# Patient Record
Sex: MALE | Race: BLACK OR AFRICAN AMERICAN | NOT HISPANIC OR LATINO | Employment: STUDENT | URBAN - METROPOLITAN AREA
[De-identification: names, ages, dates, MRNs, and addresses within clinical notes are randomized per-mention and may not be internally consistent; named-entity substitution may affect disease eponyms.]

---

## 2017-12-12 ENCOUNTER — HOSPITAL ENCOUNTER (EMERGENCY)
Facility: OTHER | Age: 32
Discharge: HOME | End: 2017-12-12
Attending: EMERGENCY MEDICINE
Payer: MEDICAID

## 2017-12-12 VITALS
OXYGEN SATURATION: 98 % | HEART RATE: 71 BPM | RESPIRATION RATE: 19 BRPM | HEIGHT: 75 IN | BODY MASS INDEX: 34.82 KG/M2 | DIASTOLIC BLOOD PRESSURE: 68 MMHG | WEIGHT: 280 LBS | SYSTOLIC BLOOD PRESSURE: 156 MMHG | TEMPERATURE: 98 F

## 2017-12-12 DIAGNOSIS — G47.9 SLEEP DISTURBANCE: Primary | ICD-10-CM

## 2017-12-12 DIAGNOSIS — Z76.0 ENCOUNTER FOR MEDICATION REFILL: ICD-10-CM

## 2017-12-12 PROCEDURE — 99282 EMERGENCY DEPT VISIT SF MDM: CPT

## 2017-12-12 RX ORDER — QUETIAPINE FUMARATE 300 MG/1
300 TABLET, FILM COATED ORAL NIGHTLY PRN
Qty: 3 TABLET | Refills: 0 | OUTPATIENT
Start: 2017-12-12 | End: 2018-11-29

## 2017-12-12 RX ORDER — ALPRAZOLAM 1 MG/1
1.5 TABLET ORAL NIGHTLY PRN
Qty: 5 TABLET | Refills: 0 | OUTPATIENT
Start: 2017-12-12 | End: 2018-11-29

## 2017-12-12 RX ORDER — QUETIAPINE FUMARATE 300 MG/1
TABLET, FILM COATED ORAL
COMMUNITY
End: 2017-12-12

## 2017-12-12 RX ORDER — ALPRAZOLAM 1 MG/1
1 TABLET ORAL 2 TIMES DAILY
COMMUNITY
End: 2017-12-12

## 2017-12-12 NOTE — ED NOTES
Two patient identifiers have been checked and are correct.      Appearance: Pt awake, alert & oriented to person, place & time. Pt in no acute distress at present time. Pt is clean and well groomed with clothes appropriately fastened.   Skin: Skin warm, dry & intact. Color consistent with ethnicity. Mucous membranes moist. No breakdown or brusing noted.   Musculoskeletal: Patient moving all extremities well, no obvious swelling or deformities noted. +generalized weakness  Respiratory: Respirations spontaneous, even, and non-labored. Visible chest rise noted. Airway is open and patent. No accessory muscle use noted.   Neurologic: Sensation is intact. Speech is clear and appropriate. Eyes open spontaneously, behavior appropriate to situation, follows commands, facial expression symmetrical, bilateral hand grasp equal and even, purposeful motor response noted.  Cardiac: All peripheral pulses present. No Bilateral lower extremity edema. Cap refill is <3 seconds.  Abdomen: Abdomen soft, non-tender to palpation.   : Pt reports no dysuria or hematuria.

## 2017-12-12 NOTE — ED NOTES
Pt to ED for Medication Refill. Pt reports that he has insomnia & is out of his sleep medications- seroquel and xanax. Pt reports that he has been awake for the past 3 days, unable to sleep. Pt's ran out his medications on Sunday & his MD is out of town through Wednesday. Pt reports some weakness. Pt denies any other complaints. Pt AAOx4 and appropriate at this time. Respirations even and unlabored. No acute distress noted.

## 2017-12-12 NOTE — ED PROVIDER NOTES
Encounter Date: 12/12/2017       History     Chief Complaint   Patient presents with    Medication Refill     Pt reports that he has insomnia & is out of his sleep medications. Pt reports that he has been awake for the past 3 days, unable to sleep. Pt's ran out his medications on Sunday & his MD is out of town through Wednesday.     Patient is a 32 y.o. male with a past medical history of insomnia, presenting to the emergency department with complaints of sleep disturbance and for medication refill.  The patient reports he has a long-standing history of insomnia for which he has had to be hospitalized before.  He states that he typically takes Seroquel and Xanax in the evenings to help him sleep.  He reports that he took his last dose on 12/9/17.  He states that for the past 2 nights, he has been unable to get any sleep.  He admits that he did not schedule his next appointment with his psychiatrist appropriately, and his psychiatrist is out of town until tomorrow.  He states he went to the office today, but that he could not be seen by any one else.  He states he plans to go back tomorrow.  He denies any suicidal or homicidal ideation.  He denies any previous suicide attempt.  He denies any hallucinations.  He denies other symptoms.      The history is provided by the patient.     Review of patient's allergies indicates:   Allergen Reactions    Ativan [lorazepam] Other (See Comments)     Muscle spasms in neck & back when taking medication.     History reviewed. No pertinent past medical history.  History reviewed. No pertinent surgical history.  History reviewed. No pertinent family history.  Social History   Substance Use Topics    Smoking status: Current Some Day Smoker    Smokeless tobacco: Current User     Types: Chew    Alcohol use Yes      Comment: socially     Review of Systems   Constitutional: Negative for activity change, chills, fatigue and fever.   HENT: Negative for congestion, rhinorrhea and sore  throat.    Eyes: Negative for photophobia and visual disturbance.   Respiratory: Negative for cough and shortness of breath.    Cardiovascular: Negative for chest pain.   Gastrointestinal: Negative for abdominal pain, diarrhea, nausea and vomiting.   Genitourinary: Negative for dysuria, hematuria and urgency.   Musculoskeletal: Negative for back pain, myalgias and neck pain.   Skin: Negative for color change and wound.   Neurological: Negative for weakness and headaches.   Psychiatric/Behavioral: Positive for sleep disturbance. Negative for agitation and confusion.       Physical Exam     Initial Vitals [12/12/17 1428]   BP Pulse Resp Temp SpO2   (!) 166/93 79 18 97.6 °F (36.4 °C) 96 %      MAP       117.33         Physical Exam    Nursing note and vitals reviewed.  Constitutional: He appears well-developed and well-nourished. He is not diaphoretic. He is cooperative.  Non-toxic appearance. He does not have a sickly appearance. He does not appear ill. No distress.   Well appearing, -American male unaccompanied in the emergency department.  He speaking clear and full sentences.  He is in no acute distress.  He ambulates without difficulty.   HENT:   Head: Normocephalic and atraumatic.   Right Ear: External ear normal.   Left Ear: External ear normal.   Nose: Nose normal.   Mouth/Throat: Oropharynx is clear and moist.   Eyes: Conjunctivae and EOM are normal.   Neck: Normal range of motion. Neck supple.   Cardiovascular: Normal rate, regular rhythm and normal heart sounds.   Pulmonary/Chest: Breath sounds normal. No respiratory distress. He has no wheezes.   Abdominal: Soft. Bowel sounds are normal. He exhibits no distension. There is no tenderness. There is no rebound and no guarding.   Musculoskeletal: Normal range of motion.   Neurological: He is alert and oriented to person, place, and time. He has normal strength. No sensory deficit. GCS eye subscore is 4. GCS verbal subscore is 5. GCS motor subscore is 6.    Skin: Skin is warm.   Psychiatric: He has a normal mood and affect. His speech is normal and behavior is normal. Judgment and thought content normal.         ED Course   Procedures  Labs Reviewed - No data to display          Medical Decision Making:   Initial Assessment:   Urgent evaluation of a 32-year-old male with a past medical history of insomnia, presenting to the emergency department for medication refill.  Patient is afebrile, nontoxic appearing, hemodynamically stable.  No focal neurological deficit or weakness.  Patient denies suicidal or homicidal ideation, denies auditory or visual hallucinations.  ED Management:  Reviewed on [facility], patient has monthly refills, last refilled over 1 month ago. Will plan to refill the patient with 3 days worth of Seroquel and Xanax.  Patient counseled on symptomatic care and treatment.  Urged to obtain close follow-up with psychiatry.  Stable for discharge. The patient was instructed to follow up with a primary care provider in 2 days or to return to the emergency department for worsening symptoms. The treatment plan was discussed with the patient who demonstrated understanding and comfort with plan. The patient's history, physical exam, and plan of care was discussed with and agreed upon with my supervising physician.    Other:   I have discussed this case with another health care provider.       <> Summary of the Discussion: Dr. Skinner  This note was created using Dragon Medical Dictation. There may be typographical errors secondary to dictation.                    ED Course      Clinical Impression:     1. Sleep disturbance    2. Encounter for medication refill         Disposition:   Disposition: Discharged  Condition: Stable                        Romy Santos PA-C  12/12/17 4149

## 2018-11-29 ENCOUNTER — HOSPITAL ENCOUNTER (EMERGENCY)
Facility: OTHER | Age: 33
Discharge: HOME | End: 2018-11-29
Attending: EMERGENCY MEDICINE

## 2018-11-29 VITALS
RESPIRATION RATE: 18 BRPM | HEART RATE: 94 BPM | OXYGEN SATURATION: 97 % | DIASTOLIC BLOOD PRESSURE: 97 MMHG | WEIGHT: 280 LBS | TEMPERATURE: 97 F | SYSTOLIC BLOOD PRESSURE: 172 MMHG | BODY MASS INDEX: 34.82 KG/M2 | HEIGHT: 75 IN

## 2018-11-29 DIAGNOSIS — Z76.0 MEDICATION REFILL: Primary | ICD-10-CM

## 2018-11-29 DIAGNOSIS — G47.00 INSOMNIA, UNSPECIFIED TYPE: ICD-10-CM

## 2018-11-29 PROCEDURE — 99281 EMR DPT VST MAYX REQ PHY/QHP: CPT

## 2018-11-29 RX ORDER — DEXTROAMPHETAMINE SACCHARATE, AMPHETAMINE ASPARTATE MONOHYDRATE, DEXTROAMPHETAMINE SULFATE AND AMPHETAMINE SULFATE 2.5; 2.5; 2.5; 2.5 MG/1; MG/1; MG/1; MG/1
10 CAPSULE, EXTENDED RELEASE ORAL EVERY MORNING
Qty: 10 CAPSULE | Refills: 0 | Status: SHIPPED | OUTPATIENT
Start: 2018-11-29 | End: 2018-11-29 | Stop reason: SDUPTHER

## 2018-11-29 RX ORDER — QUETIAPINE FUMARATE 300 MG/1
300 TABLET, FILM COATED ORAL NIGHTLY
Qty: 10 TABLET | Refills: 0 | Status: SHIPPED | OUTPATIENT
Start: 2018-11-29 | End: 2018-11-29 | Stop reason: SDUPTHER

## 2018-11-29 RX ORDER — QUETIAPINE FUMARATE 300 MG/1
300 TABLET, FILM COATED ORAL NIGHTLY
Qty: 10 TABLET | Refills: 0 | Status: SHIPPED | OUTPATIENT
Start: 2018-11-29 | End: 2019-03-01

## 2018-11-29 RX ORDER — ALPRAZOLAM 1 MG/1
1 TABLET ORAL NIGHTLY PRN
Qty: 10 TABLET | Refills: 0 | Status: SHIPPED | OUTPATIENT
Start: 2018-11-29 | End: 2018-11-29 | Stop reason: SDUPTHER

## 2018-11-29 RX ORDER — DEXTROAMPHETAMINE SACCHARATE, AMPHETAMINE ASPARTATE MONOHYDRATE, DEXTROAMPHETAMINE SULFATE AND AMPHETAMINE SULFATE 2.5; 2.5; 2.5; 2.5 MG/1; MG/1; MG/1; MG/1
10 CAPSULE, EXTENDED RELEASE ORAL EVERY MORNING
Qty: 10 CAPSULE | Refills: 0 | Status: SHIPPED | OUTPATIENT
Start: 2018-11-29 | End: 2019-03-01

## 2018-11-29 RX ORDER — ALPRAZOLAM 1 MG/1
1 TABLET ORAL NIGHTLY PRN
Qty: 10 TABLET | Refills: 0 | Status: SHIPPED | OUTPATIENT
Start: 2018-11-29 | End: 2019-03-01 | Stop reason: SDUPTHER

## 2018-11-29 NOTE — ED PROVIDER NOTES
Encounter Date: 11/29/2018       History     Chief Complaint   Patient presents with    Insomnia     Pt reports he is out of his meds especially xanax. Pt reports his Dr is out of town and he does not know when he will return. Pt reports he is anxious and has not slept in three days. He denies SI or HI.      This is a 34 y/o M who presents c/o insomnia x 3 days.  Reports out of seroquel and xanax which he takes at night to go to sleep, has been stable on these medications for years.  Had a drug test on Monday at PCP office and was supposed to get refills this week but his physician is out of town.       The history is provided by the patient.     Review of patient's allergies indicates:   Allergen Reactions    Ativan [lorazepam] Other (See Comments)     Muscle spasms in neck & back when taking medication.     No past medical history on file.  No past surgical history on file.  No family history on file.  Social History     Tobacco Use    Smoking status: Current Some Day Smoker    Smokeless tobacco: Current User     Types: Chew   Substance Use Topics    Alcohol use: Yes     Comment: socially    Drug use: No     Review of Systems   Constitutional: Negative for fever.   HENT: Negative for sore throat.    Respiratory: Negative for shortness of breath.    Cardiovascular: Negative for chest pain.   Gastrointestinal: Negative for nausea.   Genitourinary: Negative for dysuria.   Musculoskeletal: Negative for back pain.   Skin: Negative for rash.   Neurological: Negative for weakness.   Hematological: Does not bruise/bleed easily.   Psychiatric/Behavioral: Positive for dysphoric mood and sleep disturbance. Negative for suicidal ideas. The patient is nervous/anxious.    All other systems reviewed and are negative.      Physical Exam     Initial Vitals [11/29/18 1416]   BP Pulse Resp Temp SpO2   (!) 172/97 94 18 97.2 °F (36.2 °C) 97 %      MAP       --         Physical Exam    Nursing note and vitals  reviewed.  Constitutional: He appears well-developed and well-nourished.   HENT:   Head: Normocephalic and atraumatic.   Eyes: Conjunctivae and EOM are normal. Pupils are equal, round, and reactive to light.   Neck: Normal range of motion. Neck supple.   Cardiovascular: Normal rate, regular rhythm, normal heart sounds and intact distal pulses. Exam reveals no gallop and no friction rub.    No murmur heard.  Pulmonary/Chest: Breath sounds normal. No stridor. No respiratory distress. He has no wheezes. He has no rhonchi. He has no rales. He exhibits no tenderness.   Abdominal: Soft. Bowel sounds are normal. He exhibits no distension. There is no tenderness. There is no rebound and no guarding.   Musculoskeletal: Normal range of motion.   Neurological: He is alert and oriented to person, place, and time. He has normal strength. No cranial nerve deficit. GCS score is 15. GCS eye subscore is 4. GCS verbal subscore is 5. GCS motor subscore is 6.   Skin: Skin is warm and dry. Capillary refill takes less than 2 seconds.   Psychiatric: His speech is normal and behavior is normal. His mood appears anxious. Cognition and memory are normal.         ED Course   Procedures  Labs Reviewed - No data to display       Imaging Results    None          Medical Decision Making:   Initial Assessment:   34 y/o M out of usual medications, reviewed  and patient appears compliant with treatment plan.  WIll rx brief refill, patient understands this is for emergency supply and that he needs to see his doctor next week in order to obtain usual rx.                    ED Course as of Nov 29 1440   Thu Nov 29, 2018   1418 Sort note: Lola Wetzel, 33 y.o.  presented to the ED with c/o running out of medication (xanax) with insomnia and anxiety, in the middle of finals. No SI/HI.    Patient seen and medically screened by the Physician in Triage due to ED crowding.  Appropriate tests and/or medications ordered.  I am not assuming care of this  patient at this time 2:18 PM. LF    [LF]      ED Course User Index  [LF] Vonda Tafoya MD     Clinical Impression:   The primary encounter diagnosis was Medication refill. A diagnosis of Insomnia, unspecified type was also pertinent to this visit.      Disposition:   Disposition: Discharged  Condition: Stable                        Vonda Tafoya MD  11/30/18 1030

## 2019-03-01 ENCOUNTER — HOSPITAL ENCOUNTER (EMERGENCY)
Facility: OTHER | Age: 34
Discharge: HOME | End: 2019-03-01
Attending: EMERGENCY MEDICINE

## 2019-03-01 VITALS
OXYGEN SATURATION: 99 % | BODY MASS INDEX: 35.43 KG/M2 | HEART RATE: 87 BPM | SYSTOLIC BLOOD PRESSURE: 173 MMHG | RESPIRATION RATE: 18 BRPM | DIASTOLIC BLOOD PRESSURE: 106 MMHG | HEIGHT: 75 IN | TEMPERATURE: 98 F | WEIGHT: 285 LBS

## 2019-03-01 DIAGNOSIS — G47.00 INSOMNIA, UNSPECIFIED TYPE: Primary | ICD-10-CM

## 2019-03-01 DIAGNOSIS — R03.0 ELEVATED BLOOD PRESSURE READING: ICD-10-CM

## 2019-03-01 PROCEDURE — 99281 EMR DPT VST MAYX REQ PHY/QHP: CPT

## 2019-03-01 RX ORDER — ALPRAZOLAM 1 MG/1
1 TABLET ORAL NIGHTLY PRN
Qty: 10 TABLET | Refills: 0 | OUTPATIENT
Start: 2019-03-01 | End: 2020-01-21

## 2019-03-01 RX ORDER — QUETIAPINE FUMARATE 300 MG/1
300 TABLET, FILM COATED ORAL NIGHTLY
Qty: 10 TABLET | Refills: 0 | Status: SHIPPED | OUTPATIENT
Start: 2019-03-01 | End: 2022-11-22 | Stop reason: DRUGHIGH

## 2019-03-02 NOTE — ED TRIAGE NOTES
Pt presents to ED for prescription refill, states he can't see his PCP because his insurance company went bankrupt. Pt reports he takes Xanax and Seroquel nightly for insomnia. Pt AAO x4.

## 2019-03-02 NOTE — ED PROVIDER NOTES
Encounter Date: 3/1/2019    SCRIBE #1 NOTE: I, Ann Whitehead, am scribing for, and in the presence of, Dr. Marshall.       History     Chief Complaint   Patient presents with    Medication Refill     Pt requesting refill of xanax, and seroquel.      Time seen by provider: 7:15 PM    This is a 34 y.o. male who presents for medication refill. He ran out of Seroquel (300 mg) and Xanax two days ago. He has been on Xanax for about 6 years and denies experiencing any seizures when he has run out of it in the past. He was given refills for these medications while on Medicaid and then could not be seen by the same doctor when he started another insurance. He states he no longer has insurance because of the company went bankrupt. He has not signed up for Medicaid yet.      The history is provided by the patient.     Review of patient's allergies indicates:   Allergen Reactions    Ativan [lorazepam] Other (See Comments)     Muscle spasms in neck & back when taking medication.     Past Medical History:   Diagnosis Date    Insomnia      History reviewed. No pertinent surgical history.  History reviewed. No pertinent family history.  Social History     Tobacco Use    Smoking status: Current Some Day Smoker    Smokeless tobacco: Current User     Types: Chew   Substance Use Topics    Alcohol use: Yes     Comment: socially    Drug use: No     Review of Systems   Constitutional: Negative for fever.   HENT: Negative for sore throat.    Respiratory: Negative for shortness of breath.    Cardiovascular: Negative for chest pain.   Gastrointestinal: Negative for nausea.   Genitourinary: Negative for dysuria.   Musculoskeletal: Negative for back pain.   Skin: Negative for rash.   Neurological: Negative for seizures and weakness.   Hematological: Does not bruise/bleed easily.       Physical Exam     Initial Vitals [03/01/19 1836]   BP Pulse Resp Temp SpO2   (!) 187/111 93 18 98.2 °F (36.8 °C) 99 %      MAP       --         Physical  Exam    Nursing note and vitals reviewed.  Constitutional: He appears well-developed and well-nourished. He is not diaphoretic. No distress.   HENT:   Head: Normocephalic and atraumatic.   Eyes: EOM are normal.   Neck: Normal range of motion. Neck supple.   Pulmonary/Chest: No respiratory distress.   Musculoskeletal: Normal range of motion.   Neurological: He is alert and oriented to person, place, and time.   Skin: Skin is warm and dry.   Psychiatric: He has a normal mood and affect.         ED Course   Procedures  Labs Reviewed - No data to display       Imaging Results    None          Medical Decision Making:   Initial Assessment:   35 yo M with reported chronic insomnia here with concern for need of medication refill. Pt is on both Seroquel 300mg qhs and Xanax, but reportedly ran out 2 days ago. Pt denies seizures when unable to obtain xanax in the past, has been here in the ED for similar requests in past.  aware shows last refill of Xanax 2/1/19. Given concern for abrupt benzo withdrawal I discussed with the patient need for strict fu psych within last week to obtain his medications, but will dc home with short course for interrim. In addition, will need to see a PCP regarding elevated bp.             Scribe Attestation:   Scribe #1: I performed the above scribed service and the documentation accurately describes the services I performed. I attest to the accuracy of the note.    Attending Attestation:           Physician Attestation for Scribe:  Physician Attestation Statement for Scribe #1: I, Dr. Marshall, reviewed documentation, as scribed by Ann Whitehead in my presence, and it is both accurate and complete.                    Clinical Impression:     1. Insomnia, unspecified type    2. Elevated blood pressure reading          Disposition:   Disposition: Discharged  Condition: Bozena Marshall MD  03/01/19 5207

## 2019-03-02 NOTE — DISCHARGE INSTRUCTIONS
You will need to follow with a primary doctor and psychiatrist for these long term medications as they can be dangerous to stop abruptly.You will also need to have your blood pressure re-checked.

## 2019-06-21 ENCOUNTER — HOSPITAL ENCOUNTER (EMERGENCY)
Facility: OTHER | Age: 34
Discharge: HOME | End: 2019-06-21
Attending: EMERGENCY MEDICINE
Payer: MEDICAID

## 2019-06-21 VITALS
OXYGEN SATURATION: 100 % | RESPIRATION RATE: 18 BRPM | SYSTOLIC BLOOD PRESSURE: 145 MMHG | HEIGHT: 75 IN | HEART RATE: 72 BPM | BODY MASS INDEX: 34.82 KG/M2 | TEMPERATURE: 98 F | WEIGHT: 280 LBS | DIASTOLIC BLOOD PRESSURE: 86 MMHG

## 2019-06-21 DIAGNOSIS — M54.12 RADICULOPATHY OF CERVICAL REGION: Primary | ICD-10-CM

## 2019-06-21 DIAGNOSIS — R29.898 HAND WEAKNESS: ICD-10-CM

## 2019-06-21 PROCEDURE — 99284 EMERGENCY DEPT VISIT MOD MDM: CPT | Mod: 25

## 2019-06-21 NOTE — DISCHARGE INSTRUCTIONS
Please report to the first floor, imaging center, for MRI today at 4:30PM.   Please wait to hear from [facility] Scheduling Department for your neurology appointment. If you develop any worsening symptoms, return to the ER.

## 2019-06-21 NOTE — ED PROVIDER NOTES
"Encounter Date: 6/21/2019       History     Chief Complaint   Patient presents with    Shoulder Pain     + right shoulder pain w/ right hand swelling after trip and fall x 1 month ago. Denies hitting head or LOC. Pt states," I was waiting for my insurance to kick in for an MRI so I can afford it". Denies hitting head or LOC     Patient is 34 year old male who present with complaints of right arm weakness that has been present for the past two months. Patient reports he has a  remote neck injury from 2012 described as "stingers". He reports that at that time had right shoulder pain and some numbness and tingling to the right arm. He reports over the years this has resolved and he never explored actually having a surgery.  He admits that about 2 months ago he had a fall to the ground that resulted in very similar type of injury described as a stinger quote.  He reports at the time of the injury he had no pain at all was able to get up and even complete his work out later that afternoon.  Upon waking in the morning he reports that his right arm was useless .  He describes weakness with some posterior shoulder pain.  He admits that he did not seek medical attention at that time because he did not have insurance.  Today he admits that his insurance is coming through and he decided not to delay his care any longer.  He reports no progression of symptoms only persistence and admits that intensity of these symptoms wax and wane and today is a good day.  He reports that he has good motion in his hand but admits it is not as strong as it should be.  He has no other complaints including no vision changes, nausea, vomiting, headache, dizziness, vision changes.  No gait abnormalities bladder or bowel incontinence or saddle anesthesia.  He has not taken any medications to help with the symptoms.  He is currently unaccompanied in the ER.          Review of patient's allergies indicates:   Allergen Reactions    Ativan " [lorazepam] Other (See Comments)     Muscle spasms in neck & back when taking medication.     Past Medical History:   Diagnosis Date    Insomnia      No past surgical history on file.  No family history on file.  Social History     Tobacco Use    Smoking status: Current Some Day Smoker    Smokeless tobacco: Current User     Types: Chew   Substance Use Topics    Alcohol use: Yes     Comment: socially    Drug use: No     Review of Systems   Constitutional: Negative for fever.   HENT: Negative for sore throat.    Respiratory: Negative for shortness of breath.    Cardiovascular: Negative for chest pain.   Gastrointestinal: Negative for nausea.   Genitourinary: Negative for dysuria.   Musculoskeletal: Negative for back pain.        Right hand weakness   Skin: Negative for rash.   Neurological: Negative for weakness.   Hematological: Does not bruise/bleed easily.       Physical Exam     Initial Vitals [06/21/19 1357]   BP Pulse Resp Temp SpO2   (!) 153/90 77 18 98.3 °F (36.8 °C) 98 %      MAP       --         Physical Exam    Nursing note and vitals reviewed.  Constitutional: He appears well-developed and well-nourished. He is not diaphoretic. No distress.   Healthy-appearing male in no acute distress or apparent pain. Makes good eye contact, speaks in clear full sentences and ambulates with ease.   HENT:   Head: Normocephalic and atraumatic.   Eyes: Conjunctivae and EOM are normal. Pupils are equal, round, and reactive to light. Right eye exhibits no discharge. Left eye exhibits no discharge. No scleral icterus.   Neck: Normal range of motion.   Cardiovascular: Normal rate, regular rhythm, normal heart sounds and intact distal pulses. Exam reveals no gallop and no friction rub.    No murmur heard.  Pulmonary/Chest: Breath sounds normal. He has no wheezes. He has no rhonchi. He has no rales.   Abdominal: Soft. Bowel sounds are normal. There is no tenderness. There is no rebound and no guarding.   Musculoskeletal:    No C, T, L midline bony tenderness crepitus or step-off  No tenderness to palpation to right trapezius  Right shoulder bony landmarks are nontender to palpation with no obvious deformity.  Shoulder elbow and wrist have normal range of motion, strength against resistance  Right upper and forearm have normal sensation to light touch.  For 2nd and 3rd fingers have 3/5 strength against resistance and median nerve distribution with normal sensation to light touch.  Decreased  strength when compared to the left.  Right upper extremity has normal exam   Lymphadenopathy:     He has no cervical adenopathy.   Neurological: He is alert and oriented to person, place, and time. He has normal strength. GCS score is 15. GCS eye subscore is 4. GCS verbal subscore is 5. GCS motor subscore is 6.   Normal gait   Skin: Skin is warm. Capillary refill takes less than 2 seconds. No rash and no abscess noted. No erythema.   Psychiatric: He has a normal mood and affect. His behavior is normal. Thought content normal.         ED Course   Procedures  Labs Reviewed - No data to display       Imaging Results    None          Medical Decision Making:   ED Management:  Urgent evaluation a 34-year-old male who presents with complaints most consistent with right-sided radiculopathy.  With motor deficits to median nerve distribution of the right hand.  He is afebrile, nontoxic appearing, hemodynamically stable. Physical exam outlined above and reveals 3/5 weakness and range of motion deficits to the 1st 2nd and 3rd digit with decreased  strength on the right side.  There was no clinical evidence of neurovascular compromise.  I appreciate triage note reports swelling but there is no clinical evidence of swelling and patient denies this.  I have considered DVT but do not feel that ultrasound is warranted at this time.  Because this is subacute complaint I do not feel that urgent MRI is warranted however do feel that Neurosurgery follow-up in  the outpatient setting would be prudent Ace tap.  Will attempt to facilitate Neurosurgery appointment in the outpatient setting.  I have considered but do not suspect cord compression, acute neurovascular compromise, CVA, TIA fracture, infection.  He verbalized understanding is amenable to plan.  He is stable for discharge. Case discussed with attending physician who agrees with plan.    3:00 PM discussed case with [facility]  line who is attempted to facilitate follow-up with Neurology and Internal medicine. He reports that Neurosurgery will not see the patient without a prior MRI to review. Because patient does not have an MRI on record, will attempt Neuro and internal medicine ambulatory referral.   3:18 PM EDMD was able to schedule the patient for an outpatient MRI C-spine without contrast at 4:30 today at the [facility]. Patient aware and amenable to plan. Patient is stable for discharge.   Other:   I have discussed this case with another health care provider.       <> Summary of the Discussion: [place]                      Clinical Impression:       ICD-10-CM ICD-9-CM   1. Radiculopathy of cervical region M54.12 723.4   2. Hand weakness R29.898 728.87                                Ambika Tolentino PA-C  06/21/19 1609

## 2020-01-21 ENCOUNTER — HOSPITAL ENCOUNTER (EMERGENCY)
Facility: OTHER | Age: 35
Discharge: HOME | End: 2020-01-21
Attending: EMERGENCY MEDICINE
Payer: MEDICAID

## 2020-01-21 VITALS
TEMPERATURE: 99 F | HEIGHT: 75 IN | OXYGEN SATURATION: 98 % | WEIGHT: 290 LBS | BODY MASS INDEX: 36.06 KG/M2 | DIASTOLIC BLOOD PRESSURE: 117 MMHG | HEART RATE: 111 BPM | SYSTOLIC BLOOD PRESSURE: 181 MMHG | RESPIRATION RATE: 16 BRPM

## 2020-01-21 DIAGNOSIS — M54.12 CERVICAL RADICULOPATHY: Primary | ICD-10-CM

## 2020-01-21 DIAGNOSIS — M48.02 CERVICAL SPINAL STENOSIS: ICD-10-CM

## 2020-01-21 PROCEDURE — 99284 EMERGENCY DEPT VISIT MOD MDM: CPT | Mod: 25

## 2020-01-21 RX ORDER — GABAPENTIN 100 MG/1
100 CAPSULE ORAL 3 TIMES DAILY
Qty: 90 CAPSULE | Refills: 0 | Status: SHIPPED | OUTPATIENT
Start: 2020-01-21 | End: 2022-06-28

## 2020-01-21 NOTE — ED PROVIDER NOTES
"Encounter Date: 1/21/2020    SCRIBE #1 NOTE: IYesi am scribing for, and in the presence of, Dr. Roque.   SCRIBE #2 NOTE: I, Jed Delgadillo, am scribing for, and in the presence of, Dr. Roque.     History     Chief Complaint   Patient presents with    Hand Pain     R hand "tingling sensation" x several months, hx of "old football injury"; reporting increasing numbness today.      Time seen by provider: 4:30 PM    This is a 34 y.o. male who presents with complaint of bilateral hand tingling that began several months ago. Patient reports history of stenosis and states symptoms have gotten progressively worse since March 2019. He states prior to March he experience bilateral hand tingling after waking up in the morning. After March he reports waking up one morning with the tingling but states it did not go away. He also reports that he began to notice decreased  strength on right hand. Patient states symptoms in right hand worsened this morning in intensity. He can fully range his fingers.  Patient denies any acute injury.  He denies any other complaints at this time. Denies fever, nausea, vomiting, headache, dizziness, or light headedness.     The history is provided by the patient.     Review of patient's allergies indicates:   Allergen Reactions    Ativan [lorazepam] Other (See Comments)     Muscle spasms in neck & back when taking medication.     Past Medical History:   Diagnosis Date    Insomnia      History reviewed. No pertinent surgical history.  History reviewed. No pertinent family history.  Social History     Tobacco Use    Smoking status: Current Some Day Smoker     Types: Vaping with nicotine    Smokeless tobacco: Current User     Types: Chew   Substance Use Topics    Alcohol use: Yes     Comment: socially    Drug use: No     Review of Systems   Constitutional: Negative for chills and fever.   HENT: Negative for congestion, sore throat and trouble swallowing.    Eyes: Negative " for photophobia and visual disturbance.   Respiratory: Negative for cough and shortness of breath.    Cardiovascular: Negative for chest pain.   Gastrointestinal: Negative for abdominal pain, nausea and vomiting.   Genitourinary: Negative for dysuria and hematuria.   Musculoskeletal: Negative for back pain and neck pain.   Skin: Negative for rash and wound.   Neurological: Negative for dizziness, light-headedness and headaches.        Positive for bilateral hand tingling and decrease strength of right hand.   Psychiatric/Behavioral: Negative.    All other systems reviewed and are negative.      Physical Exam     Initial Vitals [01/21/20 1612]   BP Pulse Resp Temp SpO2   (!) 157/96 105 18 98.8 °F (37.1 °C) 97 %      MAP       --         Physical Exam    Nursing note and vitals reviewed.  Constitutional: He appears well-developed and well-nourished. No distress.   HENT:   Head: Normocephalic and atraumatic.   Nose: Nose normal.   Mouth/Throat: Oropharynx is clear and moist.   Eyes: Conjunctivae and EOM are normal. Pupils are equal, round, and reactive to light. Right eye exhibits no discharge. Left eye exhibits no discharge.   Neck: Normal range of motion. Neck supple. No JVD present.   Cardiovascular: Normal rate, regular rhythm, normal heart sounds and intact distal pulses.   Pulmonary/Chest: Breath sounds normal. No respiratory distress. He has no wheezes. He has no rhonchi. He has no rales.   Abdominal: Soft. Bowel sounds are normal. He exhibits no distension. There is no tenderness.   Musculoskeletal: Normal range of motion. He exhibits no tenderness.   Neurological: He is alert and oriented to person, place, and time. He displays normal reflexes. No sensory deficit.   + Spurling's left    Right hand: 3/5 strength thumb / index / ring finger. Full ROM. 2+  Radial pulse. Sensation intact.   Skin: Skin is warm. Capillary refill takes less than 2 seconds. No rash noted.   Psychiatric: He has a normal mood and  affect. Thought content normal.         ED Course   Procedures  Labs Reviewed - No data to display       Imaging Results          X-Ray Cervical Spine AP And Lateral (Final result)  Result time 01/21/20 17:08:19    Final result by Connor Morrison MD (01/21/20 17:08:19)                 Impression:      No evidence of acute fracture or listhesis of the cervical spine.  Additional evaluation, as clinically warranted.      Electronically signed by: Connor Morrison MD  Date:    01/21/2020  Time:    17:08             Narrative:    EXAMINATION:  XR CERVICAL SPINE AP LATERAL    CLINICAL HISTORY:  cervical radiculopathy;    TECHNIQUE:  AP, lateral and open mouth views of the cervical spine were performed.    COMPARISON:  None.    FINDINGS:  The craniocervical junction is within normal limits.  The predental space is maintained.  The prevertebral soft tissues are unremarkable.  The cervical alignment is maintained.  The vertebral body heights are maintained.  The posterior elements are unremarkable.  The lateral masses of C1 are nondisplaced.  The intervertebral disc spaces are within normal limits.  There is no evidence of acute fracture or listhesis of the cervical spine.                              X-Rays:   Independently Interpreted Readings:   Other Readings:  C-Spine: No fracture. No dislocation. Loss of lordosis. Degenerative changes noted.    Medical Decision Making:   History:   Old Medical Records: I decided to obtain old medical records.  Differential Diagnosis:   CVA, TIA, intracranial mass, intracranial hemorrhage, Cauda equina syndrome, diskitis/osteomyelitis, epidural/paraspinal abscess, AAA, aortic dissection, post-op/hardware infection, trauma/vertebral fracture, spinal cord injury, disc herniation, spinal stenosis, sciatica, radiculopathy, neoplasm, muscle spasm, neuropathic pain,   Independently Interpreted Test(s):   I have ordered and independently interpreted X-rays - see prior notes.  Clinical Tests:    Radiological Study: Ordered and Reviewed  ED Management:  34-year-old male with concerning findings of weakness in 3 fingers of his right hand for the past 10 months that t is getting worse, with MRI findings as detailed above.  Patient reports that he had follow-up appointment with [facility] scheduled for February, but he canceled it in order to attempt to see if he could be seen sooner in our system. After taking into careful account the patient's historical factors, physical exam findings, empirical and objective data obtained on ED workup, the patient appears to be low risk for an emergent medical condition. I feel it is safe and appropriate at this time for the patient to be discharged for follow up and re-evaluation as detailed in the discharge instructions. I have discussed the specifics of the workup with the patient/guardian and the patient/guardian has verbalized understanding of the details of the workup, the diagnosis, the treatment plan, and the need for outpatient follow-up.  Although the patient has no emergent etiology today this does not preclude the development of an emergent condition some in addition, I have advised the patient that they can return to the ED and/or activated EMS at any time with worsening of her symptoms, change of their symptoms, or with any other medical complaints.  Patient's/guardian understands the emergency department visit today was primarily to address immediate concerns and to rule out emergent causes of the symptoms that may require further workup and evaluation as an outpatient.  All questions addressed and patient's/guardian given discharge instructions and follow-up information.  Patient improved with treatment in the emergency department and is comfortable going home.  Educated the patient on warning signs and symptoms for which they must seek immediate medical attention.  I emphasized the importance of followup.  Patient understands that the emergency  visit today is primarily to address immediate concerns and to rule out emergent cause of symptoms and that they may require further workup and evaluation as an outpatient. All questions addressed and patient given discharge instructions and followup information.               Scribe Attestation:   Scribe #1: I performed the above scribed service and the documentation accurately describes the services I performed. I attest to the accuracy of the note.  Scribe #2: I performed the above scribed service and the documentation accurately describes the services I performed. I attest to the accuracy of the note.    Attending Attestation:           Physician Attestation for Scribe:  Physician Attestation Statement for Scribe #1: I, Dr. Roque, reviewed documentation, as scribed by Yesi Rosas in my presence, and it is both accurate and complete.   Physician Attestation Statement for Scribe #2: I, Dr. Roque, reviewed documentation, as scribed by Jed Delgadillo in my presence, and it is both accurate and complete. I also acknowledge and confirm the content of the note done by Babakibe #1.              ED Course as of Jan 22 1704   Tue Jan 21, 2020 1927 Unable to find patient in results waiting room, or the regular waiting room.  Will continue to search    [MA]   2012 Discussed findings of MRI to patient.  Emphasized to patient the need to follow up with Neurosurgery, my concerns with his prognosis    [MA]      ED Course User Index  [MA] Timo Roque MD                Clinical Impression:     1. Cervical radiculopathy    2. Cervical spinal stenosis                              Timo Roque MD  01/23/20 2709

## 2020-01-21 NOTE — ED TRIAGE NOTES
Pt presents to ed c/o R hand intermittent numbness without tingling that he states is r/t an old football injury. The pt states that the numbness is sometimes bilaterally, stating that its to the last 2 fingers. He denies any pain at this point or numbness/tingling but admits to weakness to the right hand. The pt denies any SOB, n/v/d, fever or chills. Pt AAOx4, resp pattern even and non labored.

## 2022-06-28 ENCOUNTER — HOSPITAL ENCOUNTER (INPATIENT)
Facility: OTHER | Age: 37
LOS: 9 days | Discharge: HOME | DRG: 438 | End: 2022-07-07
Attending: EMERGENCY MEDICINE | Admitting: HOSPITALIST
Payer: MEDICAID

## 2022-06-28 DIAGNOSIS — E86.0 DEHYDRATION: ICD-10-CM

## 2022-06-28 DIAGNOSIS — R50.9 FEVER: ICD-10-CM

## 2022-06-28 DIAGNOSIS — R07.9 CHEST PAIN: ICD-10-CM

## 2022-06-28 DIAGNOSIS — F10.10 ALCOHOL ABUSE: ICD-10-CM

## 2022-06-28 DIAGNOSIS — R79.89 PSEUDOHYPONATREMIA: ICD-10-CM

## 2022-06-28 DIAGNOSIS — E87.1 HYPONATREMIA: ICD-10-CM

## 2022-06-28 DIAGNOSIS — R11.2 NAUSEA & VOMITING: ICD-10-CM

## 2022-06-28 DIAGNOSIS — K85.80 OTHER ACUTE PANCREATITIS, UNSPECIFIED COMPLICATION STATUS: ICD-10-CM

## 2022-06-28 DIAGNOSIS — K85.90 ACUTE PANCREATITIS, UNSPECIFIED COMPLICATION STATUS, UNSPECIFIED PANCREATITIS TYPE: ICD-10-CM

## 2022-06-28 DIAGNOSIS — E87.6 HYPOKALEMIA: ICD-10-CM

## 2022-06-28 DIAGNOSIS — Z78.9 ALCOHOL USE: ICD-10-CM

## 2022-06-28 DIAGNOSIS — F31.70 BIPOLAR AFFECTIVE DISORDER IN REMISSION: ICD-10-CM

## 2022-06-28 DIAGNOSIS — E87.20 LACTIC ACIDOSIS: ICD-10-CM

## 2022-06-28 DIAGNOSIS — E78.1 HYPERTRIGLYCERIDEMIA: ICD-10-CM

## 2022-06-28 DIAGNOSIS — R50.9 FEVER, UNSPECIFIED FEVER CAUSE: ICD-10-CM

## 2022-06-28 DIAGNOSIS — R11.2 NON-INTRACTABLE VOMITING WITH NAUSEA, UNSPECIFIED VOMITING TYPE: Primary | ICD-10-CM

## 2022-06-28 PROBLEM — F90.0 ATTENTION DEFICIT HYPERACTIVITY DISORDER, PREDOMINANTLY INATTENTIVE TYPE: Status: ACTIVE | Noted: 2022-01-12

## 2022-06-28 PROBLEM — F10.239 ALCOHOL DEPENDENCE WITH WITHDRAWAL: Status: ACTIVE | Noted: 2022-06-28

## 2022-06-28 PROBLEM — R11.10 NON-INTRACTABLE VOMITING: Status: ACTIVE | Noted: 2022-06-28

## 2022-06-28 PROBLEM — R74.8 ELEVATED LIPASE: Status: ACTIVE | Noted: 2022-06-28

## 2022-06-28 PROBLEM — G47.09 OTHER INSOMNIA: Status: ACTIVE | Noted: 2022-06-28

## 2022-06-28 LAB
BACTERIA #/AREA URNS HPF: ABNORMAL /HPF
BASOPHILS # BLD AUTO: ABNORMAL K/UL (ref 0–0.2)
BASOPHILS NFR BLD: 0 % (ref 0–1.9)
BILIRUB UR QL STRIP: ABNORMAL
BNP SERPL-MCNC: 12 PG/ML (ref 0–99)
CHOLEST SERPL-MCNC: 1101 MG/DL (ref 120–199)
CHOLEST/HDLC SERPL: ABNORMAL {RATIO} (ref 2–5)
CLARITY UR: ABNORMAL
COLOR UR: YELLOW
CREAT SERPL-MCNC: 1.3 MG/DL (ref 0.5–1.4)
CTP QC/QA: YES
CTP QC/QA: YES
DIFFERENTIAL METHOD: ABNORMAL
EOSINOPHIL # BLD AUTO: ABNORMAL K/UL (ref 0–0.5)
EOSINOPHIL NFR BLD: 0 % (ref 0–8)
ERYTHROCYTE [DISTWIDTH] IN BLOOD BY AUTOMATED COUNT: 20.9 % (ref 11.5–14.5)
GLUCOSE UR QL STRIP: NEGATIVE
HCT VFR BLD AUTO: 28.9 % (ref 40–54)
HCT VFR BLD CALC: 28 %PCV (ref 36–54)
HDLC SERPL-MCNC: <5 MG/DL (ref 40–75)
HDLC SERPL: ABNORMAL % (ref 20–50)
HGB BLD-MCNC: 10 G/DL
HGB BLD-MCNC: 13.5 G/DL (ref 14–18)
HGB UR QL STRIP: ABNORMAL
HYALINE CASTS #/AREA URNS LPF: 2 /LPF
IMM GRANULOCYTES # BLD AUTO: ABNORMAL K/UL (ref 0–0.04)
IMM GRANULOCYTES NFR BLD AUTO: ABNORMAL % (ref 0–0.5)
KETONES UR QL STRIP: ABNORMAL
LACTATE SERPL-SCNC: 8.7 MMOL/L (ref 0.5–2.2)
LACTATE SERPL-SCNC: 9.2 MMOL/L (ref 0.5–2.2)
LDLC SERPL CALC-MCNC: ABNORMAL MG/DL (ref 63–159)
LEUKOCYTE ESTERASE UR QL STRIP: NEGATIVE
LIPASE SERPL-CCNC: 281 U/L (ref 4–60)
LYMPHOCYTES # BLD AUTO: ABNORMAL K/UL (ref 1–4.8)
LYMPHOCYTES NFR BLD: 20 % (ref 18–48)
MCH RBC QN AUTO: 34.4 PG (ref 27–31)
MCHC RBC AUTO-ENTMCNC: ABNORMAL G/DL (ref 32–36)
MCV RBC AUTO: 74 FL (ref 82–98)
MICROSCOPIC COMMENT: ABNORMAL
MONOCYTES # BLD AUTO: ABNORMAL K/UL (ref 0.3–1)
MONOCYTES NFR BLD: 4 % (ref 4–15)
NEUTROPHILS NFR BLD: 73 % (ref 38–73)
NEUTS BAND NFR BLD MANUAL: 3 %
NITRITE UR QL STRIP: NEGATIVE
NONHDLC SERPL-MCNC: ABNORMAL MG/DL
NRBC BLD-RTO: 1 /100 WBC
PH UR STRIP: 6 [PH] (ref 5–8)
PLATELET # BLD AUTO: 203 K/UL (ref 150–450)
PMV BLD AUTO: ABNORMAL FL (ref 9.2–12.9)
POC IONIZED CALCIUM: 0.77 MMOL/L (ref 1.06–1.42)
POC MOLECULAR INFLUENZA A AGN: NEGATIVE
POC MOLECULAR INFLUENZA B AGN: NEGATIVE
POTASSIUM BLD-SCNC: 2.3 MMOL/L (ref 3.5–5.1)
PROT UR QL STRIP: ABNORMAL
RBC # BLD AUTO: 3.92 M/UL (ref 4.6–6.2)
RBC #/AREA URNS HPF: 3 /HPF (ref 0–4)
SAMPLE: ABNORMAL
SAMPLE: NORMAL
SARS-COV-2 RDRP RESP QL NAA+PROBE: NEGATIVE
SODIUM BLD-SCNC: 139 MMOL/L (ref 136–145)
SP GR UR STRIP: >=1.03 (ref 1–1.03)
SQUAMOUS #/AREA URNS HPF: 3 /HPF
TRIGL SERPL-MCNC: >5680 MG/DL (ref 30–150)
URN SPEC COLLECT METH UR: ABNORMAL
UROBILINOGEN UR STRIP-ACNC: 1 EU/DL
WBC # BLD AUTO: 13.98 K/UL (ref 3.9–12.7)
WBC #/AREA URNS HPF: 3 /HPF (ref 0–5)

## 2022-06-28 PROCEDURE — 63600175 PHARM REV CODE 636 W HCPCS: Performed by: NURSE PRACTITIONER

## 2022-06-28 PROCEDURE — 99291 CRITICAL CARE FIRST HOUR: CPT | Mod: 25

## 2022-06-28 PROCEDURE — 20000000 HC ICU ROOM

## 2022-06-28 PROCEDURE — 63600175 PHARM REV CODE 636 W HCPCS: Performed by: EMERGENCY MEDICINE

## 2022-06-28 PROCEDURE — 83930 ASSAY OF BLOOD OSMOLALITY: CPT | Performed by: NURSE PRACTITIONER

## 2022-06-28 PROCEDURE — 83690 ASSAY OF LIPASE: CPT | Performed by: EMERGENCY MEDICINE

## 2022-06-28 PROCEDURE — 80053 COMPREHEN METABOLIC PANEL: CPT | Mod: 91 | Performed by: NURSE PRACTITIONER

## 2022-06-28 PROCEDURE — 85007 BL SMEAR W/DIFF WBC COUNT: CPT | Performed by: EMERGENCY MEDICINE

## 2022-06-28 PROCEDURE — 87040 BLOOD CULTURE FOR BACTERIA: CPT | Mod: 59 | Performed by: EMERGENCY MEDICINE

## 2022-06-28 PROCEDURE — 25000003 PHARM REV CODE 250: Performed by: NURSE PRACTITIONER

## 2022-06-28 PROCEDURE — 83880 ASSAY OF NATRIURETIC PEPTIDE: CPT | Performed by: EMERGENCY MEDICINE

## 2022-06-28 PROCEDURE — 85610 PROTHROMBIN TIME: CPT | Performed by: NURSE PRACTITIONER

## 2022-06-28 PROCEDURE — 83605 ASSAY OF LACTIC ACID: CPT | Mod: 91 | Performed by: EMERGENCY MEDICINE

## 2022-06-28 PROCEDURE — 85027 COMPLETE CBC AUTOMATED: CPT | Performed by: EMERGENCY MEDICINE

## 2022-06-28 PROCEDURE — 36415 COLL VENOUS BLD VENIPUNCTURE: CPT | Performed by: EMERGENCY MEDICINE

## 2022-06-28 PROCEDURE — 25000003 PHARM REV CODE 250: Performed by: STUDENT IN AN ORGANIZED HEALTH CARE EDUCATION/TRAINING PROGRAM

## 2022-06-28 PROCEDURE — 80061 LIPID PANEL: CPT | Performed by: EMERGENCY MEDICINE

## 2022-06-28 PROCEDURE — 96375 TX/PRO/DX INJ NEW DRUG ADDON: CPT

## 2022-06-28 PROCEDURE — 96361 HYDRATE IV INFUSION ADD-ON: CPT

## 2022-06-28 PROCEDURE — 96374 THER/PROPH/DIAG INJ IV PUSH: CPT

## 2022-06-28 PROCEDURE — 80053 COMPREHEN METABOLIC PANEL: CPT | Performed by: EMERGENCY MEDICINE

## 2022-06-28 PROCEDURE — 81000 URINALYSIS NONAUTO W/SCOPE: CPT | Performed by: EMERGENCY MEDICINE

## 2022-06-28 PROCEDURE — U0002 COVID-19 LAB TEST NON-CDC: HCPCS | Performed by: EMERGENCY MEDICINE

## 2022-06-28 PROCEDURE — 25000003 PHARM REV CODE 250: Performed by: EMERGENCY MEDICINE

## 2022-06-28 RX ORDER — LANOLIN ALCOHOL/MO/W.PET/CERES
100 CREAM (GRAM) TOPICAL DAILY
Status: DISCONTINUED | OUTPATIENT
Start: 2022-06-28 | End: 2022-07-07 | Stop reason: HOSPADM

## 2022-06-28 RX ORDER — ACETAMINOPHEN 500 MG
1000 TABLET ORAL
Status: COMPLETED | OUTPATIENT
Start: 2022-06-28 | End: 2022-06-28

## 2022-06-28 RX ORDER — HYDROMORPHONE HYDROCHLORIDE 1 MG/ML
1 INJECTION, SOLUTION INTRAMUSCULAR; INTRAVENOUS; SUBCUTANEOUS EVERY 4 HOURS PRN
Status: DISCONTINUED | OUTPATIENT
Start: 2022-06-28 | End: 2022-07-07 | Stop reason: HOSPADM

## 2022-06-28 RX ORDER — DEXTROSE MONOHYDRATE AND SODIUM CHLORIDE 5; .9 G/100ML; G/100ML
INJECTION, SOLUTION INTRAVENOUS CONTINUOUS
Status: DISCONTINUED | OUTPATIENT
Start: 2022-06-29 | End: 2022-06-30

## 2022-06-28 RX ORDER — DIPHENHYDRAMINE HYDROCHLORIDE 50 MG/ML
12.5 INJECTION INTRAMUSCULAR; INTRAVENOUS
Status: COMPLETED | OUTPATIENT
Start: 2022-06-28 | End: 2022-06-28

## 2022-06-28 RX ORDER — NALOXONE HCL 0.4 MG/ML
0.02 VIAL (ML) INJECTION
Status: DISCONTINUED | OUTPATIENT
Start: 2022-06-28 | End: 2022-07-07 | Stop reason: HOSPADM

## 2022-06-28 RX ORDER — IBUPROFEN 200 MG
16 TABLET ORAL
Status: DISCONTINUED | OUTPATIENT
Start: 2022-06-28 | End: 2022-06-30

## 2022-06-28 RX ORDER — DEXMEDETOMIDINE HYDROCHLORIDE 4 UG/ML
0-1.4 INJECTION, SOLUTION INTRAVENOUS CONTINUOUS
Status: DISCONTINUED | OUTPATIENT
Start: 2022-06-29 | End: 2022-07-02

## 2022-06-28 RX ORDER — FOLIC ACID 1 MG/1
1 TABLET ORAL DAILY
Status: DISCONTINUED | OUTPATIENT
Start: 2022-06-29 | End: 2022-07-07 | Stop reason: HOSPADM

## 2022-06-28 RX ORDER — POLYETHYLENE GLYCOL 3350 17 G/17G
17 POWDER, FOR SOLUTION ORAL DAILY PRN
Status: DISCONTINUED | OUTPATIENT
Start: 2022-06-28 | End: 2022-07-07 | Stop reason: HOSPADM

## 2022-06-28 RX ORDER — DEXTROAMPHETAMINE SACCHARATE, AMPHETAMINE ASPARTATE MONOHYDRATE, DEXTROAMPHETAMINE SULFATE AND AMPHETAMINE SULFATE 5; 5; 5; 5 MG/1; MG/1; MG/1; MG/1
CAPSULE, EXTENDED RELEASE ORAL 2 TIMES DAILY
COMMUNITY
Start: 2022-04-08 | End: 2022-11-22

## 2022-06-28 RX ORDER — CALCIUM GLUCONATE 20 MG/ML
1 INJECTION, SOLUTION INTRAVENOUS ONCE
Status: COMPLETED | OUTPATIENT
Start: 2022-06-29 | End: 2022-06-29

## 2022-06-28 RX ORDER — METOCLOPRAMIDE HYDROCHLORIDE 5 MG/ML
5 INJECTION INTRAMUSCULAR; INTRAVENOUS
Status: COMPLETED | OUTPATIENT
Start: 2022-06-28 | End: 2022-06-28

## 2022-06-28 RX ORDER — PROCHLORPERAZINE EDISYLATE 5 MG/ML
5 INJECTION INTRAMUSCULAR; INTRAVENOUS EVERY 6 HOURS PRN
Status: DISCONTINUED | OUTPATIENT
Start: 2022-06-28 | End: 2022-06-28

## 2022-06-28 RX ORDER — MORPHINE SULFATE 2 MG/ML
2 INJECTION, SOLUTION INTRAMUSCULAR; INTRAVENOUS EVERY 4 HOURS PRN
Status: DISCONTINUED | OUTPATIENT
Start: 2022-06-28 | End: 2022-07-06

## 2022-06-28 RX ORDER — HEPARIN SODIUM 5000 [USP'U]/ML
5000 INJECTION, SOLUTION INTRAVENOUS; SUBCUTANEOUS EVERY 8 HOURS
Status: DISCONTINUED | OUTPATIENT
Start: 2022-06-28 | End: 2022-06-28

## 2022-06-28 RX ORDER — ONDANSETRON 2 MG/ML
4 INJECTION INTRAMUSCULAR; INTRAVENOUS EVERY 6 HOURS PRN
Status: DISCONTINUED | OUTPATIENT
Start: 2022-06-28 | End: 2022-07-07 | Stop reason: HOSPADM

## 2022-06-28 RX ORDER — ONDANSETRON 2 MG/ML
4 INJECTION INTRAMUSCULAR; INTRAVENOUS
Status: COMPLETED | OUTPATIENT
Start: 2022-06-28 | End: 2022-06-28

## 2022-06-28 RX ORDER — PHENTERMINE HYDROCHLORIDE 37.5 MG/1
37.5 TABLET ORAL DAILY
COMMUNITY
Start: 2022-01-12 | End: 2022-06-28

## 2022-06-28 RX ORDER — SODIUM CHLORIDE 9 MG/ML
INJECTION, SOLUTION INTRAVENOUS CONTINUOUS
Status: DISCONTINUED | OUTPATIENT
Start: 2022-06-28 | End: 2022-06-28

## 2022-06-28 RX ORDER — IBUPROFEN 200 MG
24 TABLET ORAL
Status: DISCONTINUED | OUTPATIENT
Start: 2022-06-28 | End: 2022-06-30

## 2022-06-28 RX ORDER — KETOROLAC TROMETHAMINE 30 MG/ML
10 INJECTION, SOLUTION INTRAMUSCULAR; INTRAVENOUS
Status: COMPLETED | OUTPATIENT
Start: 2022-06-28 | End: 2022-06-28

## 2022-06-28 RX ORDER — DIAZEPAM 10 MG/2ML
5 INJECTION INTRAMUSCULAR EVERY 4 HOURS PRN
Status: DISCONTINUED | OUTPATIENT
Start: 2022-06-28 | End: 2022-07-02

## 2022-06-28 RX ORDER — LANOLIN ALCOHOL/MO/W.PET/CERES
100 CREAM (GRAM) TOPICAL DAILY
Status: DISCONTINUED | OUTPATIENT
Start: 2022-06-29 | End: 2022-06-28

## 2022-06-28 RX ORDER — GLUCAGON 1 MG
1 KIT INJECTION
Status: DISCONTINUED | OUTPATIENT
Start: 2022-06-28 | End: 2022-06-30

## 2022-06-28 RX ORDER — ACETAMINOPHEN 325 MG/1
650 TABLET ORAL EVERY 4 HOURS PRN
Status: DISCONTINUED | OUTPATIENT
Start: 2022-06-28 | End: 2022-07-07 | Stop reason: HOSPADM

## 2022-06-28 RX ADMIN — ONDANSETRON 4 MG: 2 INJECTION INTRAMUSCULAR; INTRAVENOUS at 03:06

## 2022-06-28 RX ADMIN — ONDANSETRON 4 MG: 2 INJECTION INTRAMUSCULAR; INTRAVENOUS at 08:06

## 2022-06-28 RX ADMIN — CALCIUM GLUCONATE 1 G: 20 INJECTION, SOLUTION INTRAVENOUS at 11:06

## 2022-06-28 RX ADMIN — HEPARIN SODIUM 5000 UNITS: 5000 INJECTION INTRAVENOUS; SUBCUTANEOUS at 09:06

## 2022-06-28 RX ADMIN — POTASSIUM BICARBONATE 40 MEQ: 391 TABLET, EFFERVESCENT ORAL at 05:06

## 2022-06-28 RX ADMIN — PROMETHAZINE HYDROCHLORIDE 25 MG: 25 INJECTION INTRAMUSCULAR; INTRAVENOUS at 09:06

## 2022-06-28 RX ADMIN — DIAZEPAM 5 MG: 5 INJECTION, SOLUTION INTRAMUSCULAR; INTRAVENOUS at 09:06

## 2022-06-28 RX ADMIN — DIPHENHYDRAMINE HYDROCHLORIDE 12.5 MG: 50 INJECTION, SOLUTION INTRAMUSCULAR; INTRAVENOUS at 05:06

## 2022-06-28 RX ADMIN — SODIUM CHLORIDE 1000 ML: 0.9 INJECTION, SOLUTION INTRAVENOUS at 03:06

## 2022-06-28 RX ADMIN — SODIUM CHLORIDE: 0.9 INJECTION, SOLUTION INTRAVENOUS at 09:06

## 2022-06-28 RX ADMIN — SODIUM CHLORIDE: 0.9 INJECTION, SOLUTION INTRAVENOUS at 08:06

## 2022-06-28 RX ADMIN — KETOROLAC TROMETHAMINE 10 MG: 30 INJECTION, SOLUTION INTRAMUSCULAR at 04:06

## 2022-06-28 RX ADMIN — METOCLOPRAMIDE 5 MG: 5 INJECTION, SOLUTION INTRAMUSCULAR; INTRAVENOUS at 05:06

## 2022-06-28 RX ADMIN — THIAMINE HCL TAB 100 MG 100 MG: 100 TAB at 10:06

## 2022-06-28 RX ADMIN — SODIUM CHLORIDE 1000 ML: 0.9 INJECTION, SOLUTION INTRAVENOUS at 05:06

## 2022-06-28 RX ADMIN — ACETAMINOPHEN 1000 MG: 500 TABLET ORAL at 07:06

## 2022-06-28 RX ADMIN — HYDROMORPHONE HYDROCHLORIDE 1 MG: 1 INJECTION, SOLUTION INTRAMUSCULAR; INTRAVENOUS; SUBCUTANEOUS at 09:06

## 2022-06-28 NOTE — ED TRIAGE NOTES
38 y/o male presents to ED with L upper ABD pain radiating across upper abd and down L lower abd. Reports symptoms started a month ago but significantly worsened over the last few days.  Reports recent fevers, N/V. Denies diarrhea/constipation.

## 2022-06-28 NOTE — HPI
"Per Ileana Uribe, DNP:    "Mr Davila is a 37 yr old male with PMH that includes insomnia and bipolar 1 disorder. He came to the ED with abdominal pain, nausea, vomiting, and reported alcohol withdrawals. Pt also reports gaining 50 lbs over the past 6 months. He has been having severe insomnia for the past 30 days due to multiple stressors in his life. He was taking seroquel but it has not been working. He drinks immeasurable amounts of non-specific alcoholic beverages daily. He has not been able to keep anything down for the past two days. Pain is located in his left upper quadrant and is worse with palpation. The pain is described as cramping and started two days ago. Pt has had decreased intake and output over the past few days. He reports episodes of chills and sweats at home. Pt has allergy to ativan but is able to take diazepam. He has been admitted to hospital medicine and placed in the ICU."  "

## 2022-06-28 NOTE — ED PROVIDER NOTES
"Encounter Date: 6/28/2022    SCRIBE #1 NOTE: I, Yasmeen Dutta, am scribing for, and in the presence of, Franck Dawson II, MD.       History     Chief Complaint   Patient presents with    Abdominal Pain     Abd pain with N/V. Pt states he is depressed and has not slept in three weeks. Pt has been on a herrera for the past month and is currently going through withdrawls from ETOH.      Time seen by provider: 3:39 PM    This is a 37 y.o. male with PMHx of HTN and insomnia who presents with complaint of abdominal pain onset three weeks ago. The patient reports for the last three weeks he has been drinking copious amounts of alcohol and is unable to sleep. He describes his abdominal pain as a "punching" feeling which radiates down the left side of his abdomen. His pain is exacerbated when lying down. He describes experiencing at least two episodes of vomiting daily. The patient states over this period of time he has gained weight and is under large amounts of stress. He reports every night he has a alcoholic drink and taking Seroquel to help him sleep, which recently has not been effective. Of note the patient describes feeling "psychotic". This is the extent of the patient's complaints at this time.     The history is provided by the patient.     Review of patient's allergies indicates:   Allergen Reactions    Ativan [lorazepam] Other (See Comments)     Muscle spasms in neck & back when taking medication.     Past Medical History:   Diagnosis Date    Hypertension     Insomnia      History reviewed. No pertinent surgical history.  History reviewed. No pertinent family history.  Social History     Tobacco Use    Smoking status: Current Some Day Smoker     Types: Vaping with nicotine    Smokeless tobacco: Current User     Types: Chew   Substance Use Topics    Alcohol use: Yes     Comment: reports "a lot" every night to go to sleep. will not report a number of drinks.    Drug use: No     Review of Systems "   Constitutional: Positive for unexpected weight change. Negative for chills and fever.   HENT: Negative for sore throat.    Eyes: Negative for visual disturbance.   Respiratory: Negative for cough and shortness of breath.    Cardiovascular: Negative for chest pain.   Gastrointestinal: Positive for abdominal pain (left side) and vomiting.   Genitourinary: Negative for difficulty urinating, frequency and urgency.   Musculoskeletal: Negative for back pain.   Skin: Negative for rash and wound.   Neurological: Negative for syncope and weakness.   Psychiatric/Behavioral: Positive for sleep disturbance. Negative for agitation, behavioral problems and confusion. The patient is nervous/anxious.        Physical Exam     Initial Vitals   BP Pulse Resp Temp SpO2   06/28/22 1501 06/28/22 1501 06/28/22 1501 06/28/22 1503 06/28/22 1501   133/78 (!) 125 20 (!) 101.6 °F (38.7 °C) 97 %      MAP       --                Physical Exam    Nursing note and vitals reviewed.  Constitutional: He appears well-developed. He is not diaphoretic. No distress.   Anxious appearing.   HENT:   Head: Normocephalic and atraumatic.   Dry mucous membrane.   Eyes: Conjunctivae and EOM are normal. Pupils are equal, round, and reactive to light.   No pallor or icterus.   Neck: Neck supple.   Normal range of motion.  Cardiovascular: Regular rhythm and intact distal pulses. Tachycardia present.  Exam reveals no gallop and no friction rub.    No murmur heard.  Pulmonary/Chest: Breath sounds normal. No respiratory distress. He has no wheezes. He has no rhonchi. He has no rales.   Abdominal: He exhibits distension.   No fluid wave. Nonspecific epigastric and left sided tenderness. There is no rebound and no guarding.   Musculoskeletal:         General: No tenderness or edema. Normal range of motion.      Cervical back: Normal range of motion and neck supple.     Neurological: He is alert and oriented to person, place, and time.   Skin: Skin is warm and dry.          ED Course   Critical Care    Date/Time: 6/29/2022 12:07 AM  Performed by: Franck Dawson II, MD  Authorized by: Nba Johnson MD   Direct patient critical care time: 20 minutes  Additional history critical care time: 5 minutes  Ordering / reviewing critical care time: 15 minutes  Documentation critical care time: 10 minutes  Consulting other physicians critical care time: 8 minutes  Total critical care time (exclusive of procedural time) : 58 minutes  Critical care was necessary to treat or prevent imminent or life-threatening deterioration of the following conditions: cardiac failure, circulatory failure, hepatic failure, renal failure, endocrine crisis, dehydration and metabolic crisis.        Labs Reviewed   COMPREHENSIVE METABOLIC PANEL - Abnormal; Notable for the following components:       Result Value    Sodium 118 (*)     Potassium 3.4 (*)     Chloride 87 (*)     CO2 12 (*)     Total Protein 18.1 (*)     Albumin 3.1 (*)     Total Bilirubin 2.4 (*)     Anion Gap 19 (*)     All other components within normal limits   CBC W/ AUTO DIFFERENTIAL - Abnormal; Notable for the following components:    WBC 13.98 (*)     RBC 3.92 (*)     Hemoglobin 13.5 (*)     Hematocrit 28.9 (*)     MCV 74 (*)     MCH 34.4 (*)     RDW 20.9 (*)     nRBC 1 (*)     All other components within normal limits   LIPASE - Abnormal; Notable for the following components:    Lipase 281 (*)     All other components within normal limits   LACTIC ACID, PLASMA - Abnormal; Notable for the following components:    Lactate (Lactic Acid) 8.7 (*)     All other components within normal limits    Narrative:        critical result(s) called and verbal readback obtained from   BRUCE SHEPHERD RN by GWYN 06/28/2022 17:09   URINALYSIS, REFLEX TO URINE CULTURE - Abnormal; Notable for the following components:    Appearance, UA Hazy (*)     Specific Gravity, UA >=1.030 (*)     Protein, UA 2+ (*)     Ketones, UA Trace (*)     Bilirubin (UA) 1+ (*)      Occult Blood UA 1+ (*)     All other components within normal limits    Narrative:     Specimen Source->Urine   URINALYSIS MICROSCOPIC - Abnormal; Notable for the following components:    Hyaline Casts, UA 2 (*)     All other components within normal limits    Narrative:     Specimen Source->Urine   ISTAT PROCEDURE - Abnormal; Notable for the following components:    POC Potassium 2.3 (*)     POC Ionized Calcium 0.77 (*)     POC Hematocrit 28 (*)     All other components within normal limits   CULTURE, BLOOD   B-TYPE NATRIURETIC PEPTIDE   B-TYPE NATRIURETIC PEPTIDE   COMPREHENSIVE METABOLIC PANEL   PROTIME-INR   HEPATIC FUNCTION PANEL   OSMOLALITY, SERUM   SARS-COV-2 RDRP GENE   POCT INFLUENZA A/B MOLECULAR   ISTAT CREATININE          Imaging Results          US Abdomen Complete (Final result)  Result time 06/28/22 20:38:24    Final result by Perico Herrera MD (06/28/22 20:38:24)                 Impression:      1. No acute abnormalities identified.  2. Hepatomegaly with suspected hepatic steatosis.      Electronically signed by: Perico Herrera MD  Date:    06/28/2022  Time:    20:38             Narrative:    EXAMINATION:  US ABDOMEN COMPLETE    CLINICAL HISTORY:  ab pain/elevated bilirubin;    TECHNIQUE:  Abdominal ultrasound was performed.    COMPARISON:  None.    FINDINGS:  The liver is enlarged measuring 26cm.  Hepatic parenchyma is diffusely echogenic in appearance which is most suggestive for diffuse fatty infiltration.  No intra- or extrahepatic biliary ductal dilatation. The common bile duct measures 0.5 cm.  The gallbladder appears normal. No evidence for cholelithiasis.  Sonographic Chirinos's sign is negative. Pancreas and aorta are largely obscured by overlying bowel gas.  Visualized portions of the IVC appear normal.  The spleen is normal in size measuring 8 cm. No ascites.  Kidneys measure 12 cm on the right and left with no evidence of hydronephrosis.                               X-Ray Abdomen Flat  And Erect (Final result)  Result time 06/28/22 17:22:27    Final result by Perico Herrera MD (06/28/22 17:22:27)                 Impression:      Nonobstructive bowel gas pattern.      Electronically signed by: Perico Herrera MD  Date:    06/28/2022  Time:    17:22             Narrative:    EXAMINATION:  XR ABDOMEN FLAT AND ERECT    CLINICAL HISTORY:  Nausea with vomiting, unspecified    TECHNIQUE:  Flat and erect AP views of the abdomen were performed.    COMPARISON:  None    FINDINGS:  Nonspecific bowel gas pattern.  No evidence to suggest obstruction.  No free air identified.  Scattered stool is seen in the colon.  Partially visualized lung bases are clear.                               X-Ray Chest PA And Lateral (Final result)  Result time 06/28/22 17:22:05    Final result by Perico Herrera MD (06/28/22 17:22:05)                 Impression:      No acute cardiopulmonary process identified.      Electronically signed by: Perico Herrera MD  Date:    06/28/2022  Time:    17:22             Narrative:    EXAMINATION:  XR CHEST PA AND LATERAL    CLINICAL HISTORY:  Fever, unspecified    TECHNIQUE:  PA and lateral views of the chest were performed.    COMPARISON:  None    FINDINGS:  Cardiac silhouette appears upper limits of normal in size however noting appearance is accentuated by hypoinflated lungs.  No evidence of focal consolidative process, pneumothorax, or significant pleural effusion.  No acute osseous abnormality identified.                              X-Rays:   Independently Interpreted Readings:   Chest X-Ray: No focal infiltrates or effusion. Slight cardiomegaly.   Abdomen:   Flat and Erect of Abdomen - No air-fluid levels or free air.     Medications   ondansetron injection 4 mg (4 mg Intravenous Given 6/28/22 2048)   polyethylene glycol packet 17 g (has no administration in time range)   acetaminophen tablet 650 mg (has no administration in time range)   naloxone 0.4 mg/mL injection 0.02 mg (has no  administration in time range)   glucose chewable tablet 16 g (has no administration in time range)   glucose chewable tablet 24 g (has no administration in time range)   glucagon (human recombinant) injection 1 mg (has no administration in time range)   morphine injection 2 mg (2 mg Intravenous Given 6/29/22 0018)   HYDROmorphone injection 1 mg (1 mg Intravenous Given 6/28/22 2125)   dextrose 10% bolus 125 mL (has no administration in time range)   dextrose 10% bolus 250 mL (has no administration in time range)   promethazine (PHENERGAN) 25 mg in dextrose 5 % 50 mL IVPB (0 mg Intravenous Stopped 6/28/22 2151)   multivitamin tablet (has no administration in time range)   folic acid tablet 1 mg (has no administration in time range)   diazePAM injection 5 mg (5 mg Intravenous Given 6/28/22 2128)   thiamine tablet 100 mg (100 mg Oral Given 6/28/22 2234)   dexmedetomidine (PRECEDEX) 400mcg/100mL 0.9% NaCL infusion (has no administration in time range)   calcium gluconate 1 g in NS IVPB (premixed) (1 g Intravenous New Bag 6/28/22 2327)   dextrose 10% bolus 125 mL (has no administration in time range)   dextrose 10% bolus 250 mL (has no administration in time range)   insulin regular in 0.9 % NaCl 100 unit/100 mL (1 unit/mL) infusion (has no administration in time range)   dextrose 5 % and 0.9 % NaCl infusion ( Intravenous New Bag 6/29/22 0006)   sodium chloride 0.9% flush 10 mL (has no administration in time range)     And   sodium chloride 0.9% flush 10 mL (has no administration in time range)   sodium chloride 0.9% bolus 1,000 mL (0 mLs Intravenous Stopped 6/28/22 1645)   ondansetron injection 4 mg (4 mg Intravenous Given 6/28/22 1551)   ketorolac injection 9.999 mg (9.999 mg Intravenous Given 6/28/22 1634)   potassium bicarbonate disintegrating tablet 40 mEq (40 mEq Oral Given 6/28/22 1715)   sodium chloride 0.9% bolus 1,000 mL (0 mLs Intravenous Stopped 6/28/22 1822)   diphenhydrAMINE injection 12.5 mg (12.5 mg  Intravenous Given 6/28/22 1722)   metoclopramide HCl injection 5 mg (5 mg Intravenous Given 6/28/22 1723)   acetaminophen tablet 1,000 mg (1,000 mg Oral Given 6/28/22 1923)     Medical Decision Making:   History:   Old Medical Records: I decided to obtain old medical records.  Independently Interpreted Test(s):   I have ordered and independently interpreted X-rays - see prior notes.  Clinical Tests:   Lab Tests: Ordered and Reviewed  Radiological Study: Ordered and Reviewed     patient presents complaining of nausea vomiting.  However noted to be febrile, patient also tachycardic, appeared dehydrated.  Reports he has been drinking heavily, cannot quantify the amount.  IV fluid repletion begun.  Laboratory studies were complicated by the fact that patient's blood appeared to be very like P gerald, had to be sent to [facility].  We therefore performed I-STAT values which showed hypokalemia, began oral repletion.  Laboratory studies showed hyponatremia, hypochloremia, elevated lactic acid.  Lipid panel is added on, triglycerides and cholesterol extremely high.    Mild elevation of white count, however chest x-ray did not show pneumonia.  COVID was negative.  Urinalysis does not suggest UTI.  Case discussed with hospitalist service: will admit the ICU given multitude of acute symptoms, and anticipated need for IV insulin drip for elevated triglycerides; hold antibiotics at this time as clinical picture suggests metabolic process rather than infectious.            Scribe Attestation:   Scribe #1: I performed the above scribed service and the documentation accurately describes the services I performed. I attest to the accuracy of the note.               Physician Attestation for Scribe: I, [facility], reviewed documentation as scribed in my presence, which is both accurate and complete.  Clinical Impression:   Final diagnoses:  [R11.2] Nausea & vomiting  [R50.9] Fever  [R11.2] Non-intractable vomiting with nausea, unspecified  vomiting type (Primary)  [F10.10] Alcohol abuse  [E86.0] Dehydration  [K85.90] Acute pancreatitis, unspecified complication status, unspecified pancreatitis type  [E87.6] Hypokalemia  [E87.1] Hyponatremia          ED Disposition Condition    Admit               Franck Dawson II, MD  06/29/22 0023     no rash/no itching

## 2022-06-29 LAB
ALBUMIN SERPL BCP-MCNC: 1.9 G/DL (ref 3.5–5.2)
ALBUMIN SERPL BCP-MCNC: 2 G/DL (ref 3.5–5.2)
ALBUMIN SERPL BCP-MCNC: 2.9 G/DL (ref 3.5–5.2)
ALBUMIN SERPL BCP-MCNC: 2.9 G/DL (ref 3.5–5.2)
ALBUMIN SERPL BCP-MCNC: 3.1 G/DL (ref 3.5–5.2)
ALP SERPL-CCNC: 360 U/L (ref 55–135)
ALP SERPL-CCNC: 361 U/L (ref 55–135)
ALP SERPL-CCNC: 369 U/L (ref 55–135)
ALP SERPL-CCNC: 384 U/L (ref 55–135)
ALP SERPL-CCNC: 384 U/L (ref 55–135)
ALT SERPL W/O P-5'-P-CCNC: 103 U/L (ref 10–44)
ALT SERPL W/O P-5'-P-CCNC: 112 U/L (ref 10–44)
ALT SERPL W/O P-5'-P-CCNC: 112 U/L (ref 10–44)
ALT SERPL W/O P-5'-P-CCNC: 113 U/L (ref 10–44)
ALT SERPL W/O P-5'-P-CCNC: 87 U/L (ref 10–44)
ANION GAP SERPL CALC-SCNC: 11 MMOL/L (ref 8–16)
ANION GAP SERPL CALC-SCNC: 12 MMOL/L (ref 8–16)
ANION GAP SERPL CALC-SCNC: 19 MMOL/L (ref 8–16)
ANION GAP SERPL CALC-SCNC: 22 MMOL/L (ref 8–16)
ANISOCYTOSIS BLD QL SMEAR: SLIGHT
AST SERPL-CCNC: 168 U/L (ref 10–40)
AST SERPL-CCNC: 255 U/L (ref 10–40)
AST SERPL-CCNC: 270 U/L (ref 10–40)
AST SERPL-CCNC: 326 U/L (ref 10–40)
AST SERPL-CCNC: 326 U/L (ref 10–40)
BASOPHILS # BLD AUTO: 0.06 K/UL (ref 0–0.2)
BASOPHILS NFR BLD: 0.3 % (ref 0–1.9)
BILIRUB DIRECT SERPL-MCNC: 0.9 MG/DL (ref 0.1–0.3)
BILIRUB SERPL-MCNC: 1.3 MG/DL (ref 0.1–1)
BILIRUB SERPL-MCNC: 1.3 MG/DL (ref 0.1–1)
BILIRUB SERPL-MCNC: 1.5 MG/DL (ref 0.1–1)
BILIRUB SERPL-MCNC: 1.8 MG/DL (ref 0.1–1)
BILIRUB SERPL-MCNC: 2.4 MG/DL (ref 0.1–1)
BUN SERPL-MCNC: 11 MG/DL (ref 6–20)
BUN SERPL-MCNC: 11 MG/DL (ref 6–20)
BUN SERPL-MCNC: 13 MG/DL (ref 6–20)
BUN SERPL-MCNC: 15 MG/DL (ref 6–20)
CA-I BLDV-SCNC: 1.09 MMOL/L (ref 1.06–1.42)
CALCIUM SERPL-MCNC: 7.7 MG/DL (ref 8.7–10.5)
CALCIUM SERPL-MCNC: 8 MG/DL (ref 8.7–10.5)
CALCIUM SERPL-MCNC: 9.4 MG/DL (ref 8.7–10.5)
CALCIUM SERPL-MCNC: 9.6 MG/DL (ref 8.7–10.5)
CHLORIDE SERPL-SCNC: 85 MMOL/L (ref 95–110)
CHLORIDE SERPL-SCNC: 87 MMOL/L (ref 95–110)
CHLORIDE SERPL-SCNC: 96 MMOL/L (ref 95–110)
CHLORIDE SERPL-SCNC: 97 MMOL/L (ref 95–110)
CO2 SERPL-SCNC: 12 MMOL/L (ref 23–29)
CO2 SERPL-SCNC: 13 MMOL/L (ref 23–29)
CO2 SERPL-SCNC: 19 MMOL/L (ref 23–29)
CO2 SERPL-SCNC: 21 MMOL/L (ref 23–29)
CREAT SERPL-MCNC: 1.1 MG/DL (ref 0.5–1.4)
CREAT SERPL-MCNC: 1.2 MG/DL (ref 0.5–1.4)
CREAT SERPL-MCNC: 1.3 MG/DL (ref 0.5–1.4)
CREAT SERPL-MCNC: 1.4 MG/DL (ref 0.5–1.4)
DIFFERENTIAL METHOD: ABNORMAL
EOSINOPHIL # BLD AUTO: 0 K/UL (ref 0–0.5)
EOSINOPHIL NFR BLD: 0 % (ref 0–8)
ERYTHROCYTE [DISTWIDTH] IN BLOOD BY AUTOMATED COUNT: 16.9 % (ref 11.5–14.5)
EST. GFR  (AFRICAN AMERICAN): >60 ML/MIN/1.73 M^2
EST. GFR  (NON AFRICAN AMERICAN): >60 ML/MIN/1.73 M^2
ESTIMATED AVG GLUCOSE: 68 MG/DL (ref 68–131)
GLUCOSE SERPL-MCNC: 115 MG/DL (ref 70–110)
GLUCOSE SERPL-MCNC: 134 MG/DL (ref 70–110)
GLUCOSE SERPL-MCNC: 62 MG/DL (ref 70–110)
GLUCOSE SERPL-MCNC: 71 MG/DL (ref 70–110)
HBA1C MFR BLD: 4 % (ref 4–5.6)
HCT VFR BLD AUTO: 31.8 % (ref 40–54)
HGB BLD-MCNC: 11.7 G/DL (ref 14–18)
IMM GRANULOCYTES # BLD AUTO: 0.33 K/UL (ref 0–0.04)
IMM GRANULOCYTES NFR BLD AUTO: 1.9 % (ref 0–0.5)
INR PPP: 1.1 (ref 0.8–1.2)
LACTATE SERPL-SCNC: 3.8 MMOL/L (ref 0.5–2.2)
LACTATE SERPL-SCNC: 6.3 MMOL/L (ref 0.5–2.2)
LIPASE SERPL-CCNC: 202 U/L (ref 4–60)
LYMPHOCYTES # BLD AUTO: 1.6 K/UL (ref 1–4.8)
LYMPHOCYTES NFR BLD: 9.1 % (ref 18–48)
MAGNESIUM SERPL-MCNC: 1.7 MG/DL (ref 1.6–2.6)
MCH RBC QN AUTO: 32.2 PG (ref 27–31)
MCHC RBC AUTO-ENTMCNC: 36.8 G/DL (ref 32–36)
MCV RBC AUTO: 88 FL (ref 82–98)
MONOCYTES # BLD AUTO: 0.7 K/UL (ref 0.3–1)
MONOCYTES NFR BLD: 3.9 % (ref 4–15)
NEUTROPHILS # BLD AUTO: 15 K/UL (ref 1.8–7.7)
NEUTROPHILS NFR BLD: 84.8 % (ref 38–73)
NRBC BLD-RTO: 0 /100 WBC
OSMOLALITY SERPL: 300 MOSM/KG (ref 280–300)
PHOSPHATE SERPL-MCNC: 1.8 MG/DL (ref 2.7–4.5)
PLATELET # BLD AUTO: 190 K/UL (ref 150–450)
PLATELET BLD QL SMEAR: ABNORMAL
PMV BLD AUTO: 10.4 FL (ref 9.2–12.9)
POCT GLUCOSE: 113 MG/DL (ref 70–110)
POCT GLUCOSE: 118 MG/DL (ref 70–110)
POCT GLUCOSE: 126 MG/DL (ref 70–110)
POCT GLUCOSE: 128 MG/DL (ref 70–110)
POCT GLUCOSE: 129 MG/DL (ref 70–110)
POCT GLUCOSE: 130 MG/DL (ref 70–110)
POCT GLUCOSE: 137 MG/DL (ref 70–110)
POCT GLUCOSE: 137 MG/DL (ref 70–110)
POCT GLUCOSE: 138 MG/DL (ref 70–110)
POCT GLUCOSE: 138 MG/DL (ref 70–110)
POCT GLUCOSE: 143 MG/DL (ref 70–110)
POCT GLUCOSE: 143 MG/DL (ref 70–110)
POCT GLUCOSE: 156 MG/DL (ref 70–110)
POCT GLUCOSE: 159 MG/DL (ref 70–110)
POCT GLUCOSE: 165 MG/DL (ref 70–110)
POCT GLUCOSE: 166 MG/DL (ref 70–110)
POCT GLUCOSE: 173 MG/DL (ref 70–110)
POCT GLUCOSE: 90 MG/DL (ref 70–110)
POCT GLUCOSE: 97 MG/DL (ref 70–110)
POTASSIUM SERPL-SCNC: 3.4 MMOL/L (ref 3.5–5.1)
POTASSIUM SERPL-SCNC: 3.5 MMOL/L (ref 3.5–5.1)
POTASSIUM SERPL-SCNC: 3.6 MMOL/L (ref 3.5–5.1)
POTASSIUM SERPL-SCNC: 3.7 MMOL/L (ref 3.5–5.1)
POTASSIUM SERPL-SCNC: 3.8 MMOL/L (ref 3.5–5.1)
PROT SERPL-MCNC: 18.1 G/DL (ref 6–8.4)
PROT SERPL-MCNC: 6.4 G/DL (ref 6–8.4)
PROT SERPL-MCNC: 7.1 G/DL (ref 6–8.4)
PROT SERPL-MCNC: >18.4 G/DL (ref 6–8.4)
PROT SERPL-MCNC: >18.4 G/DL (ref 6–8.4)
PROTHROMBIN TIME: 10.9 SEC (ref 9–12.5)
RBC # BLD AUTO: 3.63 M/UL (ref 4.6–6.2)
SODIUM SERPL-SCNC: 118 MMOL/L (ref 136–145)
SODIUM SERPL-SCNC: 120 MMOL/L (ref 136–145)
SODIUM SERPL-SCNC: 128 MMOL/L (ref 136–145)
SODIUM SERPL-SCNC: 128 MMOL/L (ref 136–145)
WBC # BLD AUTO: 17.7 K/UL (ref 3.9–12.7)

## 2022-06-29 PROCEDURE — 83690 ASSAY OF LIPASE: CPT | Performed by: NURSE PRACTITIONER

## 2022-06-29 PROCEDURE — 84478 ASSAY OF TRIGLYCERIDES: CPT | Performed by: INTERNAL MEDICINE

## 2022-06-29 PROCEDURE — 83605 ASSAY OF LACTIC ACID: CPT | Performed by: HOSPITALIST

## 2022-06-29 PROCEDURE — 25000003 PHARM REV CODE 250: Performed by: NURSE PRACTITIONER

## 2022-06-29 PROCEDURE — 25500020 PHARM REV CODE 255: Performed by: HOSPITALIST

## 2022-06-29 PROCEDURE — 84100 ASSAY OF PHOSPHORUS: CPT | Performed by: NURSE PRACTITIONER

## 2022-06-29 PROCEDURE — 36569 INSJ PICC 5 YR+ W/O IMAGING: CPT

## 2022-06-29 PROCEDURE — 83605 ASSAY OF LACTIC ACID: CPT | Mod: 91 | Performed by: STUDENT IN AN ORGANIZED HEALTH CARE EDUCATION/TRAINING PROGRAM

## 2022-06-29 PROCEDURE — 63600175 PHARM REV CODE 636 W HCPCS: Performed by: HOSPITALIST

## 2022-06-29 PROCEDURE — 99223 PR INITIAL HOSPITAL CARE,LEVL III: ICD-10-PCS | Mod: ,,, | Performed by: HOSPITALIST

## 2022-06-29 PROCEDURE — 80053 COMPREHEN METABOLIC PANEL: CPT | Performed by: NURSE PRACTITIONER

## 2022-06-29 PROCEDURE — 99223 PR INITIAL HOSPITAL CARE,LEVL III: ICD-10-PCS | Mod: ,,, | Performed by: INTERNAL MEDICINE

## 2022-06-29 PROCEDURE — G0425 PR INPT TELEHEALTH CONSULT 30M: ICD-10-PCS | Mod: 95,,, | Performed by: PSYCHIATRY & NEUROLOGY

## 2022-06-29 PROCEDURE — 25000003 PHARM REV CODE 250: Performed by: STUDENT IN AN ORGANIZED HEALTH CARE EDUCATION/TRAINING PROGRAM

## 2022-06-29 PROCEDURE — 83036 HEMOGLOBIN GLYCOSYLATED A1C: CPT | Performed by: NURSE PRACTITIONER

## 2022-06-29 PROCEDURE — A4216 STERILE WATER/SALINE, 10 ML: HCPCS | Performed by: HOSPITALIST

## 2022-06-29 PROCEDURE — 63600175 PHARM REV CODE 636 W HCPCS: Performed by: NURSE PRACTITIONER

## 2022-06-29 PROCEDURE — C1751 CATH, INF, PER/CENT/MIDLINE: HCPCS

## 2022-06-29 PROCEDURE — 20000000 HC ICU ROOM

## 2022-06-29 PROCEDURE — A9698 NON-RAD CONTRAST MATERIALNOC: HCPCS | Performed by: HOSPITALIST

## 2022-06-29 PROCEDURE — 99223 1ST HOSP IP/OBS HIGH 75: CPT | Mod: ,,, | Performed by: INTERNAL MEDICINE

## 2022-06-29 PROCEDURE — 83735 ASSAY OF MAGNESIUM: CPT | Performed by: NURSE PRACTITIONER

## 2022-06-29 PROCEDURE — 80053 COMPREHEN METABOLIC PANEL: CPT | Mod: 91 | Performed by: HOSPITALIST

## 2022-06-29 PROCEDURE — 99223 1ST HOSP IP/OBS HIGH 75: CPT | Mod: ,,, | Performed by: HOSPITALIST

## 2022-06-29 PROCEDURE — 82330 ASSAY OF CALCIUM: CPT | Performed by: NURSE PRACTITIONER

## 2022-06-29 PROCEDURE — G0425 INPT/ED TELECONSULT30: HCPCS | Mod: 95,,, | Performed by: PSYCHIATRY & NEUROLOGY

## 2022-06-29 PROCEDURE — 84132 ASSAY OF SERUM POTASSIUM: CPT | Performed by: NURSE PRACTITIONER

## 2022-06-29 PROCEDURE — 25000003 PHARM REV CODE 250: Performed by: HOSPITALIST

## 2022-06-29 PROCEDURE — 36415 COLL VENOUS BLD VENIPUNCTURE: CPT | Performed by: NURSE PRACTITIONER

## 2022-06-29 PROCEDURE — 85025 COMPLETE CBC W/AUTO DIFF WBC: CPT | Performed by: NURSE PRACTITIONER

## 2022-06-29 RX ORDER — DOBUTAMINE HYDROCHLORIDE 400 MG/100ML
INJECTION INTRAVENOUS
Status: DISPENSED
Start: 2022-06-29 | End: 2022-06-29

## 2022-06-29 RX ORDER — SODIUM CHLORIDE 0.9 % (FLUSH) 0.9 %
10 SYRINGE (ML) INJECTION
Status: DISCONTINUED | OUTPATIENT
Start: 2022-06-29 | End: 2022-07-07 | Stop reason: HOSPADM

## 2022-06-29 RX ORDER — SODIUM CHLORIDE 0.9 % (FLUSH) 0.9 %
10 SYRINGE (ML) INJECTION EVERY 6 HOURS
Status: DISCONTINUED | OUTPATIENT
Start: 2022-06-29 | End: 2022-07-07 | Stop reason: HOSPADM

## 2022-06-29 RX ORDER — DEXTROSE MONOHYDRATE 100 MG/ML
INJECTION, SOLUTION INTRAVENOUS CONTINUOUS
Status: DISCONTINUED | OUTPATIENT
Start: 2022-06-29 | End: 2022-07-03

## 2022-06-29 RX ORDER — LABETALOL HYDROCHLORIDE 5 MG/ML
10 INJECTION, SOLUTION INTRAVENOUS EVERY 6 HOURS PRN
Status: DISCONTINUED | OUTPATIENT
Start: 2022-06-29 | End: 2022-07-07 | Stop reason: HOSPADM

## 2022-06-29 RX ORDER — DEXTROSE MONOHYDRATE 100 MG/ML
INJECTION, SOLUTION INTRAVENOUS CONTINUOUS
Status: DISCONTINUED | OUTPATIENT
Start: 2022-06-29 | End: 2022-06-29

## 2022-06-29 RX ADMIN — IOHEXOL 100 ML: 350 INJECTION, SOLUTION INTRAVENOUS at 03:06

## 2022-06-29 RX ADMIN — MORPHINE SULFATE 2 MG: 2 INJECTION, SOLUTION INTRAMUSCULAR; INTRAVENOUS at 04:06

## 2022-06-29 RX ADMIN — DEXTROSE: 10 SOLUTION INTRAVENOUS at 09:06

## 2022-06-29 RX ADMIN — SODIUM CHLORIDE, PRESERVATIVE FREE 10 ML: 5 INJECTION INTRAVENOUS at 05:06

## 2022-06-29 RX ADMIN — LABETALOL HYDROCHLORIDE 10 MG: 5 INJECTION INTRAVENOUS at 02:06

## 2022-06-29 RX ADMIN — DEXTROSE AND SODIUM CHLORIDE: 5; .9 INJECTION, SOLUTION INTRAVENOUS at 12:06

## 2022-06-29 RX ADMIN — DIAZEPAM 5 MG: 5 INJECTION, SOLUTION INTRAMUSCULAR; INTRAVENOUS at 01:06

## 2022-06-29 RX ADMIN — THIAMINE HCL TAB 100 MG 100 MG: 100 TAB at 08:06

## 2022-06-29 RX ADMIN — INSULIN HUMAN 1 UNITS/HR: 1 INJECTION, SOLUTION INTRAVENOUS at 05:06

## 2022-06-29 RX ADMIN — ONDANSETRON 4 MG: 2 INJECTION INTRAMUSCULAR; INTRAVENOUS at 02:06

## 2022-06-29 RX ADMIN — PROMETHAZINE HYDROCHLORIDE 25 MG: 25 INJECTION INTRAMUSCULAR; INTRAVENOUS at 08:06

## 2022-06-29 RX ADMIN — DEXTROSE: 10 SOLUTION INTRAVENOUS at 10:06

## 2022-06-29 RX ADMIN — Medication 650 MG: at 11:06

## 2022-06-29 RX ADMIN — DIAZEPAM 5 MG: 5 INJECTION, SOLUTION INTRAMUSCULAR; INTRAVENOUS at 07:06

## 2022-06-29 RX ADMIN — SODIUM CHLORIDE, PRESERVATIVE FREE 10 ML: 5 INJECTION INTRAVENOUS at 01:06

## 2022-06-29 RX ADMIN — DEXMEDETOMIDINE HYDROCHLORIDE 0.4 MCG/KG/HR: 4 INJECTION, SOLUTION INTRAVENOUS at 05:06

## 2022-06-29 RX ADMIN — DEXMEDETOMIDINE HYDROCHLORIDE 0.1 MCG/KG/HR: 4 INJECTION, SOLUTION INTRAVENOUS at 12:06

## 2022-06-29 RX ADMIN — FOLIC ACID 1 MG: 1 TABLET ORAL at 08:06

## 2022-06-29 RX ADMIN — DEXMEDETOMIDINE HYDROCHLORIDE 0.6 MCG/KG/HR: 4 INJECTION, SOLUTION INTRAVENOUS at 10:06

## 2022-06-29 RX ADMIN — MORPHINE SULFATE 2 MG: 2 INJECTION, SOLUTION INTRAMUSCULAR; INTRAVENOUS at 07:06

## 2022-06-29 RX ADMIN — DEXTROSE AND SODIUM CHLORIDE: 5; .9 INJECTION, SOLUTION INTRAVENOUS at 07:06

## 2022-06-29 RX ADMIN — INSULIN HUMAN 1 UNITS/HR: 1 INJECTION, SOLUTION INTRAVENOUS at 01:06

## 2022-06-29 RX ADMIN — HYDROMORPHONE HYDROCHLORIDE 1 MG: 1 INJECTION, SOLUTION INTRAMUSCULAR; INTRAVENOUS; SUBCUTANEOUS at 01:06

## 2022-06-29 RX ADMIN — MORPHINE SULFATE 2 MG: 2 INJECTION, SOLUTION INTRAMUSCULAR; INTRAVENOUS at 12:06

## 2022-06-29 RX ADMIN — DEXTROSE: 10 SOLUTION INTRAVENOUS at 01:06

## 2022-06-29 RX ADMIN — DIAZEPAM 5 MG: 5 INJECTION, SOLUTION INTRAMUSCULAR; INTRAVENOUS at 04:06

## 2022-06-29 RX ADMIN — DEXMEDETOMIDINE HYDROCHLORIDE 0.6 MCG/KG/HR: 4 INJECTION, SOLUTION INTRAVENOUS at 09:06

## 2022-06-29 RX ADMIN — DEXMEDETOMIDINE HYDROCHLORIDE 0.6 MCG/KG/HR: 4 INJECTION, SOLUTION INTRAVENOUS at 04:06

## 2022-06-29 RX ADMIN — THERA TABS 1 TABLET: TAB at 08:06

## 2022-06-29 RX ADMIN — IOHEXOL 1000 ML: 12 SOLUTION ORAL at 01:06

## 2022-06-29 RX ADMIN — DEXTROSE AND SODIUM CHLORIDE: 5; .9 INJECTION, SOLUTION INTRAVENOUS at 04:06

## 2022-06-29 NOTE — ASSESSMENT & PLAN NOTE
Lactic Acidosis  PseudoHyponatremia  Hypocalcemia  Preliminary trigs greater than 5,000 with tachycardia, lactic acidosis, and hypocalcemia.  Pt to be started on insulin drip and a 5% dextrose infusion with NS to maintain glucose levels and for fluid resuscitation. Goal is to infuse insulin at a rate of 0.1 units/kg/hour until trigs are less than 500 mg/dL. Check triglycerides every 12 hours. Due to ionized calcium of 0.77 pt ws ordered 1g calcium gluconate IV with repeat levels. It is difficult to get lab results on pt bc they have to be sent to main [facility]. Will monitor CMP. Trend Lactic acid.

## 2022-06-29 NOTE — CONSULTS
[facility] Pulmonary/Critical Care Consult Note      Patient:Lola Wetzel  Age:37 y.o.  MRN:[id number]  Admit date:6/28/2022  LOS:1 day(s)     Primary Team: Hospital Medicine  Primary Attending: Alex    Reason for Consult: severe pancreatitis and lactic acidosis     HPI:       Damari Davila is a 37 year old man with PMH BP1D who presented to [facility] with abdominal pain, nausea, vomiting for approximately 2 days. Pain primarily in LUQ and is cramping in nature. Reports he has been drinking large amounts of alcohol daily for several months. Also endorses severe insomnia over the past month due to life stressors. ER workup notable for Na 120, TG > 5600, lipase 281, LA 9.2, WBC 13.9. CXR and abd US unrevealing. Patient admitted to ICU under hospital medicine.       MEDICAL HISTORY:      Past Medical History:  Past Medical History:   Diagnosis Date    Hypertension     Insomnia       Past Surgical History:  History reviewed. No pertinent surgical history.      Family History:   History reviewed. No pertinent family history.      Social History:  - Tobacco: vapes several times weekly  - Alcohol: daily heavy ETOH use  - Drugs: denies     Allergies:  Review of patient's allergies indicates:   Allergen Reactions    Ativan [lorazepam] Other (See Comments)     Muscle spasms in neck & back when taking medication.       Home Medications:  Current Outpatient Medications   Medication Instructions    dextroamphetamine-amphetamine (ADDERALL XR) 20 MG 24 hr capsule Oral, 2 times daily    QUEtiapine (SEROQUEL) 300 mg, Oral, Nightly      OBJECTIVE DATA:      Vital Signs:  Vitals:    06/29/22 0759   BP:    Pulse:    Resp: (!) 22   Temp:      Physical Exam:  Constitutional: cooperative, calm, obese  HEENT: NCAT, EOMI, MMM  Neck: Supple, normal ROM  Resp: CTABL, no wheeze  Cardio: RRR, S1 and S2 normal  GI: Soft, non-tender, bowel sounds active  Extremities: No edema, peripheral pulses 2+ and symmetric  Skin: No rashes, bruises, or  lesions  Neurologic: A&O x3, follows commands, moves extremities spontaneously     Laboratory/Imaging:  Recent Labs   Lab 22  1547 22  1621 22  2045 22  0010 22  0302   WBC 13.98*  --   --   --  17.70*   HGB 13.5*  --   --   --  11.7*   HCT 28.9* 28*  --   --  31.8*     --   --   --  190   *  --  120*  --  128*   K 3.4*  --  3.7 3.5 3.8   CL 87*  --  85*  --  96   CREATININE 1.1  --  1.2  --  1.3   BUN 15  --  13  --  11   CO2 12*  --  13*  --  21*   *  --  112*  112*  --  103*   *  --  326*  326*  --  255*     Microbiology:  22 Blood Cx: NGTD     Imagin22 CXR: Cardiac silhouette appears upper limits of normal in size however noting appearance is accentuated by hypoinflated lungs.  No evidence of focal consolidative process, pneumothorax, or significant pleural effusion.  No acute osseous abnormality identified.    22 Abd Xray: Nonobstructive bowel gas pattern.    22 US Abd:   1. No acute abnormalities identified.  2. Hepatomegaly with suspected hepatic steatosis.    Medications:   folic acid  1 mg Oral Daily    multivitamin  1 tablet Oral Daily    sodium chloride 0.9%  10 mL Intravenous Q6H    thiamine  100 mg Oral Daily       dexmedetomidine (PRECEDEX) infusion 0.6 mcg/kg/hr (22)    dextrose 10 % in water (D10W) 75 mL/hr at 22    dextrose 5 % and 0.9 % NaCl 125 mL/hr at 22    insulin regular 1 units/mL infusion orderable (DKA) 1 Units/hr (22)      acetaminophen, dextrose 10%, dextrose 10%, diazePAM, glucagon (human recombinant), glucose, glucose, HYDROmorphone, labetaloL, morphine, naloxone, ondansetron, polyethylene glycol, promethazine (PHENERGAN) IVPB, Flushing PICC Protocol **AND** sodium chloride 0.9% **AND** sodium chloride 0.9%     Assessment/Plan:     Neuro/Psych  Bipolar 1 Disorder   - previously on seroquel, not on any meds at home presently  - psychiatry  following    ETOH Abuse  - last drink 4 days ago, not currently in withdrawals   - on precedex gtt  - agree with psych recs, valium 5mg IV q4h prn withdrawal symptoms for now  - would schedule valium if actively begins withdrawing   - continue thiamine, folate, MVI    Suicidal Ideation  - intermittent thoughts of SI per psych eval on admission  - PEC in place     Cardiovascular  HTN  - has labetalol prn      Respiratory  - no active issues, on RA     GI/FEN  Acute Pancreatitis  - continue insulin drip and D10 infusion  - q12h TG levels  - GI following, agree with CT AP to further evaluate for pancreatic necrosis or fluid collections  - would advance diet when tolerated     F: D10 infusion  E: replete prn  N: clear liquid diet     Renal   Lactic Acidosis  - LA 9.2 on admit, down-trending to 6.3 with fluids  - as above, agree with CT abdomen   - continue to trend LA     Heme  - no active issues  - would add lovenox for DVT ppx     Endocrine  - BG goal 140s-180s  - on D10 and insulin gtt     Infectious Disease  - low suspicion for active infection, suspect leukocytosis related to his acute pancreatitis  - UA and CXR without signs of infection  - blood cultures NGTD  - CT AP pending    Feeding: clear liquid diet  Analgesia: tylenol prn, dilaudid prn  Sedation: precedex  Thrombo PPX: SCDs  Head of Bed: > 30 degrees  Ulcer PPX: none  Glucose: on insulin gtt   SBT/SAT: N/A  Bowel Regimen: one  Indwelling Lines: PIV, picc  Abx: none    Code: Full     Thank you for allowing us to participate in the care of this patient. We will continue to follow.      Karen Lincoln MD  [facility] Pul[facility]/Critical Care Fellow  06/29/2022 8:53 AM  [phone]

## 2022-06-29 NOTE — SUBJECTIVE & OBJECTIVE
"Past Medical History:   Diagnosis Date    Hypertension     Insomnia        History reviewed. No pertinent surgical history.    Review of patient's allergies indicates:   Allergen Reactions    Ativan [lorazepam] Other (See Comments)     Muscle spasms in neck & back when taking medication.       No current facility-administered medications on file prior to encounter.     Current Outpatient Medications on File Prior to Encounter   Medication Sig    QUEtiapine (SEROQUEL) 300 MG Tab Take 1 tablet (300 mg total) by mouth nightly. for 10 days    [DISCONTINUED] gabapentin (NEURONTIN) 100 MG capsule Take 1 capsule (100 mg total) by mouth 3 (three) times daily.    dextroamphetamine-amphetamine (ADDERALL XR) 20 MG 24 hr capsule Take by mouth 2 (two) times daily.    [DISCONTINUED] phentermine (ADIPEX-P) 37.5 mg tablet Take 37.5 mg by mouth once daily.     Family History    None       Tobacco Use    Smoking status: Current Some Day Smoker     Types: Vaping with nicotine    Smokeless tobacco: Current User     Types: Chew   Substance and Sexual Activity    Alcohol use: Yes     Comment: reports "a lot" every night to go to sleep. will not report a number of drinks.    Drug use: No    Sexual activity: Yes     Review of Systems   Constitutional: Negative.  Negative for activity change, appetite change, chills and fever.   HENT: Negative.  Negative for congestion, mouth sores and trouble swallowing.    Eyes: Negative.  Negative for photophobia, pain and visual disturbance.   Respiratory: Negative.  Negative for cough, choking, chest tightness and shortness of breath.    Cardiovascular: Negative.  Negative for chest pain, palpitations and leg swelling.   Gastrointestinal:  Positive for abdominal distention, abdominal pain, nausea and vomiting. Negative for blood in stool, constipation and diarrhea.   Endocrine: Negative.  Negative for polydipsia, polyphagia and polyuria.   Genitourinary:  Positive for decreased urine volume. Negative " for difficulty urinating, dysuria and enuresis.   Musculoskeletal: Negative.  Negative for arthralgias, back pain and gait problem.   Skin: Negative.  Negative for pallor and rash.   Allergic/Immunologic: Negative.  Negative for environmental allergies and food allergies.   Neurological: Negative.  Negative for dizziness, facial asymmetry, speech difficulty, weakness and headaches.   Hematological: Negative.    Psychiatric/Behavioral: Negative.  Negative for agitation, behavioral problems and confusion.    Objective:     Vital Signs (Most Recent):  Temp: 99.3 °F (37.4 °C) (06/28/22 2302)  Pulse: (!) 122 (06/28/22 2302)  Resp: (!) 28 (06/28/22 2302)  BP: (!) 186/89 (06/28/22 2302)  SpO2: (!) 94 % (06/28/22 2302)   Vital Signs (24h Range):  Temp:  [98.7 °F (37.1 °C)-101.6 °F (38.7 °C)] 99.3 °F (37.4 °C)  Pulse:  [118-142] 122  Resp:  [20-37] 28  SpO2:  [94 %-97 %] 94 %  BP: (126-186)/() 186/89     Weight: (!) 164.2 kg (361 lb 15.9 oz)  Body mass index is 45.25 kg/m².    Physical Exam  Vitals reviewed.   Constitutional:       General: He is not in acute distress.     Appearance: Normal appearance.   HENT:      Head: Normocephalic and atraumatic.      Mouth/Throat:      Mouth: Mucous membranes are moist.      Pharynx: Oropharynx is clear. No posterior oropharyngeal erythema.   Eyes:      Extraocular Movements: Extraocular movements intact.      Pupils: Pupils are equal, round, and reactive to light.   Cardiovascular:      Rate and Rhythm: Regular rhythm. Tachycardia present.      Pulses: Normal pulses.      Heart sounds: Normal heart sounds. No murmur heard.  Pulmonary:      Effort: Pulmonary effort is normal. No respiratory distress.      Breath sounds: Normal breath sounds. No wheezing.   Abdominal:      General: Bowel sounds are normal. There is distension.      Palpations: Abdomen is soft.      Tenderness: There is abdominal tenderness. There is no guarding.   Musculoskeletal:         General: No swelling,  tenderness or signs of injury. Normal range of motion.      Cervical back: Normal range of motion and neck supple. No rigidity or tenderness.   Skin:     General: Skin is warm and dry.   Neurological:      General: No focal deficit present.      Mental Status: He is alert and oriented to person, place, and time.      Cranial Nerves: No cranial nerve deficit.      Sensory: No sensory deficit.      Motor: No weakness.   Psychiatric:         Mood and Affect: Mood normal. Affect is tearful.         Behavior: Behavior normal.         CRANIAL NERVES     CN III, IV, VI   Pupils are equal, round, and reactive to light.     Significant Labs: All pertinent labs within the past 24 hours have been reviewed.  A1C: No results for input(s): HGBA1C in the last 4320 hours.  Bilirubin:   Recent Labs   Lab 06/28/22  1547   BILITOT 2.4*     BMP:   Recent Labs   Lab 06/28/22  1547   GLU 71   *   K 3.4*   CL 87*   CO2 12*   BUN 15   CALCIUM 9.6     CBC:   Recent Labs   Lab 06/28/22  1547 06/28/22  1621   WBC 13.98*  --    HGB 13.5*  --    HCT 28.9* 28*     --      CMP:   Recent Labs   Lab 06/28/22  1547   *   K 3.4*   CL 87*   CO2 12*   GLU 71   BUN 15   CALCIUM 9.6   PROT 18.1*   ALBUMIN 3.1*   BILITOT 2.4*   ANIONGAP 19*     Lactic Acid:   Recent Labs   Lab 06/28/22  1641 06/28/22  2045   LACTATE 8.7* 9.2*     Lipase:   Recent Labs   Lab 06/28/22  1547   LIPASE 281*     Lipid Panel:   Recent Labs   Lab 06/28/22  1641   CHOL 1,101*   HDL <5*   LDLCALC Invalid, Trig>400.0   TRIG >5,680*   CHOLHDL Unable to calculate     Magnesium: No results for input(s): MG in the last 48 hours.  TSH: No results for input(s): TSH in the last 4320 hours.  Urine Studies:   Recent Labs   Lab 06/28/22  1803   COLORU Yellow   APPEARANCEUA Hazy*   PHUR 6.0   SPECGRAV >=1.030*   PROTEINUA 2+*   GLUCUA Negative   KETONESU Trace*   BILIRUBINUA 1+*   OCCULTUA 1+*   NITRITE Negative   UROBILINOGEN 1.0   LEUKOCYTESUR Negative   RBCUA 3   WBCUA 3    BACTERIA Occasional   SQUAMEPITHEL 3   HYALINECASTS 2*       Significant Imaging: I have reviewed all pertinent imaging results/findings within the past 24 hours.  US Abdomen Complete  Narrative: EXAMINATION:  US ABDOMEN COMPLETE    CLINICAL HISTORY:  ab pain/elevated bilirubin;    TECHNIQUE:  Abdominal ultrasound was performed.    COMPARISON:  None.    FINDINGS:  The liver is enlarged measuring 26cm.  Hepatic parenchyma is diffusely echogenic in appearance which is most suggestive for diffuse fatty infiltration.  No intra- or extrahepatic biliary ductal dilatation. The common bile duct measures 0.5 cm.  The gallbladder appears normal. No evidence for cholelithiasis.  Sonographic Chirinos's sign is negative. Pancreas and aorta are largely obscured by overlying bowel gas.  Visualized portions of the IVC appear normal.  The spleen is normal in size measuring 8 cm. No ascites.  Kidneys measure 12 cm on the right and left with no evidence of hydronephrosis.  Impression: 1. No acute abnormalities identified.  2. Hepatomegaly with suspected hepatic steatosis.    Electronically signed by: Perico Herrera MD  Date:    06/28/2022  Time:    20:38  X-Ray Abdomen Flat And Erect  Narrative: EXAMINATION:  XR ABDOMEN FLAT AND ERECT    CLINICAL HISTORY:  Nausea with vomiting, unspecified    TECHNIQUE:  Flat and erect AP views of the abdomen were performed.    COMPARISON:  None    FINDINGS:  Nonspecific bowel gas pattern.  No evidence to suggest obstruction.  No free air identified.  Scattered stool is seen in the colon.  Partially visualized lung bases are clear.  Impression: Nonobstructive bowel gas pattern.    Electronically signed by: Perico Herrera MD  Date:    06/28/2022  Time:    17:22  X-Ray Chest PA And Lateral  Narrative: EXAMINATION:  XR CHEST PA AND LATERAL    CLINICAL HISTORY:  Fever, unspecified    TECHNIQUE:  PA and lateral views of the chest were performed.    COMPARISON:  None    FINDINGS:  Cardiac silhouette appears  upper limits of normal in size however noting appearance is accentuated by hypoinflated lungs.  No evidence of focal consolidative process, pneumothorax, or significant pleural effusion.  No acute osseous abnormality identified.  Impression: No acute cardiopulmonary process identified.    Electronically signed by: Perico Herrera MD  Date:    06/28/2022  Time:    17:22

## 2022-06-29 NOTE — NURSING
BG is now 130. Orders received from Prieto BAHENA to begin insulin gtt at 1u/h, then titrate accordingly.

## 2022-06-29 NOTE — ASSESSMENT & PLAN NOTE
Elevated trigs and ETOH induced. Lipase 281. Abdominal ultrasound was negative for biliary dilatation. Pancreas was obscured by bowel gas. IV fluid resuscitation in process. Will trend lipase. Pt NPO.

## 2022-06-29 NOTE — CONSULTS
".[facility] ([place])  Gastroenterology  Consult Note    Patient Name: Lola Wetzel  MRN: [id number]  Admission Date: 6/28/2022  Hospital Length of Stay: 1 days  Code Status: Full Code   Primary Care Physician: Primary Doctor No  Principal Problem:Hypertriglyceridemia    Inpatient consult to Gastroenterology  Consult performed by: Darinel Peace MD  Consult ordered by: Nba Johnson MD        Subjective:     Chief complaint: abdominal pain    HPI: Mr. Lola Wetzel is a 37 y.o. man with a hx of HTN, bipolar disorder, alcohol abuse who presented to the ER with complaint of epigastric pain radiating across upper and left side of abdomen with associated nausea and vomiting.  Pain is alleviated by pain medication and exacerbated by PO intake.  He reports drinking 24oz white claw as well as Vodka most days.  He has been drinking more to try and help him sleep. He reports subjective fevers and chills at home.  He has no known hx of pancreatitis.    Past medical history:  Past Medical History:   Diagnosis Date    Hypertension     Insomnia    Biplar disorder    Past surgical history:  History reviewed. No pertinent surgical history.    Social history:  Social History     Socioeconomic History    Marital status: Single   Tobacco Use    Smoking status: Current Some Day Smoker     Types: Vaping with nicotine    Smokeless tobacco: Current User     Types: Chew   Substance and Sexual Activity    Alcohol use: Yes     Comment: reports "a lot" every night to go to sleep. will not report a number of drinks.    Drug use: No    Sexual activity: Yes       Family history:  No hx of pancreatitis, severe hypertriglyceridemia, GI malignancies    Medications:  Medications Prior to Admission   Medication Sig Dispense Refill Last Dose    QUEtiapine (SEROQUEL) 300 MG Tab Take 1 tablet (300 mg total) by mouth nightly. for 10 days 10 tablet 0 Past Month at Unknown time    dextroamphetamine-amphetamine (ADDERALL XR) " 20 MG 24 hr capsule Take by mouth 2 (two) times daily.   Unknown at Unknown time       Allergies:  Review of patient's allergies indicates:   Allergen Reactions    Ativan [lorazepam] Other (See Comments)     Muscle spasms in neck & back when taking medication.       Review of systems:  CONSTITUTIONAL: Negative for  weight loss, +fevers, chills, weakness, weight gain.  HEENT: Negative for blurred vision, hearing loss, nasal congestion, dry mouth, sore throat.  CARDIOVASCULAR: Negative for chest pain or palpitations.  RESPIRATORY: Negative for SOB or cough.  GASTROINTESTINAL: See HPI  GENITOURINARY: Negative for dysuria or hematuria.  MUSCULOSKELETAL: Negative for osteoarthritis or muscle pain.  SKIN: Negative for rashes/lesions.  NEUROLOGIC: Negative for headaches, numbness/tingling.  ENDOCRINE: Negative for diabetes or thyroid abnormalities.  HEMATOLOGIC: Negative for easy bruising or bleeding  Aside from above positives, complete 10 point review of systems negative.    Objective:     Vital Signs (Most Recent):  Temp: 99.5 °F (37.5 °C) (06/29/22 0400)  Pulse: 103 (06/29/22 0600)  Resp: (!) 22 (06/29/22 0759)  BP: (!) 142/100 (06/29/22 0600)  SpO2: (!) 93 % (06/29/22 0600) Vital Signs (24h Range):  Temp:  [98.7 °F (37.1 °C)-101.6 °F (38.7 °C)] 99.5 °F (37.5 °C)  Pulse:  [103-142] 103  Resp:  [20-37] 22  SpO2:  [92 %-97 %] 93 %  BP: (126-186)/() 142/100     Physical examination:  General: obese, NAD  HENT: NCAT, hearing grossly intact, no palpable or visible thyroid mass  Eyes: EOMI, anicteric sclera  LYMH: No cervical, supraclavicular or axillary lymphadenopathy   Cardiovascular: tachy, regular, no murmurs appreciated.  Lungs: Non-labored respirations. Breath sounds equal.   Abdomen: distended but soft, non-tender (he just received pain medication), hypoactive bowel sounds, no rebound or guarding  Extremities: No C/C/E, 2+ dorsalis pedis pulses bilaterally  Neuro: AA&O x 3, answers questions appropriately,  +tremor  Psych: cooperative, normal speech, states he is depressed  Skin: No jaundice, rashes or lesions  Musculoskeletal: 5/5 strength bilaterally    Labs:  CBC:   Recent Labs   Lab 06/28/22  1547 06/28/22  1621 06/29/22  0302   WBC 13.98*  --  17.70*   HGB 13.5*  --  11.7*   HCT 28.9* 28* 31.8*     --  190     CMP:   Recent Labs   Lab 06/29/22  0302   *   CALCIUM 8.0*   ALBUMIN 2.0*   PROT 7.1   *   K 3.8   CO2 21*   CL 96   BUN 11   CREATININE 1.3   ALKPHOS 361*   *   *   BILITOT 1.5*     Coagulation:   Recent Labs   Lab 06/28/22 2045   INR 1.1     Lactate 6.3 (from 9.2)  TG >5680    Imaging:  Imaging results within the past 24 hours have been reviewed.  Imaging Results          US Abdomen Complete (Final result)  Result time 06/28/22 20:38:24    Final result by Perico Herrera MD (06/28/22 20:38:24)                 Impression:      1. No acute abnormalities identified.  2. Hepatomegaly with suspected hepatic steatosis.      Electronically signed by: Perico Herrera MD  Date:    06/28/2022  Time:    20:38             Narrative:    EXAMINATION:  US ABDOMEN COMPLETE    CLINICAL HISTORY:  ab pain/elevated bilirubin;    TECHNIQUE:  Abdominal ultrasound was performed.    COMPARISON:  None.    FINDINGS:  The liver is enlarged measuring 26cm.  Hepatic parenchyma is diffusely echogenic in appearance which is most suggestive for diffuse fatty infiltration.  No intra- or extrahepatic biliary ductal dilatation. The common bile duct measures 0.5 cm.  The gallbladder appears normal. No evidence for cholelithiasis.  Sonographic Chirinos's sign is negative. Pancreas and aorta are largely obscured by overlying bowel gas.  Visualized portions of the IVC appear normal.  The spleen is normal in size measuring 8 cm. No ascites.  Kidneys measure 12 cm on the right and left with no evidence of hydronephrosis.                               X-Ray Abdomen Flat And Erect (Final result)  Result time  06/28/22 17:22:27    Final result by Perico Herrera MD (06/28/22 17:22:27)                 Impression:      Nonobstructive bowel gas pattern.      Electronically signed by: Perico Herrera MD  Date:    06/28/2022  Time:    17:22             Narrative:    EXAMINATION:  XR ABDOMEN FLAT AND ERECT    CLINICAL HISTORY:  Nausea with vomiting, unspecified    TECHNIQUE:  Flat and erect AP views of the abdomen were performed.    COMPARISON:  None    FINDINGS:  Nonspecific bowel gas pattern.  No evidence to suggest obstruction.  No free air identified.  Scattered stool is seen in the colon.  Partially visualized lung bases are clear.                               X-Ray Chest PA And Lateral (Final result)  Result time 06/28/22 17:22:05    Final result by Perico Herrera MD (06/28/22 17:22:05)                 Impression:      No acute cardiopulmonary process identified.      Electronically signed by: Perico Herrera MD  Date:    06/28/2022  Time:    17:22             Narrative:    EXAMINATION:  XR CHEST PA AND LATERAL    CLINICAL HISTORY:  Fever, unspecified    TECHNIQUE:  PA and lateral views of the chest were performed.    COMPARISON:  None    FINDINGS:  Cardiac silhouette appears upper limits of normal in size however noting appearance is accentuated by hypoinflated lungs.  No evidence of focal consolidative process, pneumothorax, or significant pleural effusion.  No acute osseous abnormality identified.                                 Assessment:   37 y.o. man with hx of bipolar disorder and alcohol abuse admitted with severe pancreatitis 2/2 hypertriglyceridemia and alcohol. Suspect he has associated ileus.    Plan:   - Continue with IVF and insulin drip  - Plasmapheresis could be considered if this is available  - Monitor respiratory status with fluids  - Monitor triglycerides q12 hours   - Would consider CT A/P, ideally with IV contrast, to evaluate for pancreatic necrosis or peripancreatic fluid collections, given  leukocytosis (could be just from pancreatitis itself) and abdominal distension (?ileus)  - Trend CMP, CBC, lactate  - Monitor for EtOH withdrawal and treat per primary team  - MVI, thiamine and folate      Thank you for your consult.     Darinel Peace MD  Gastroenterology  [facility] ([place])

## 2022-06-29 NOTE — H&P
[facility] Intensive [facility] Medicine  History & Physical    Patient Name: Lola Wetzel  MRN: [id number]  Patient Class: IP- Inpatient  Admission Date: 6/28/2022  Attending Physician: Nba Johnson MD   Primary Care Provider: Primary Doctor No         Patient information was obtained from patient and ER records.     Subjective:     Principal Problem:Hypertriglyceridemia    Chief Complaint:   Chief Complaint   Patient presents with    Abdominal Pain     Abd pain with N/V. Pt states he is depressed and has not slept in three weeks. Pt has been on a herrera for the past month and is currently going through withdrawls from ETOH.         HPI: Mr Davila is a 37 yr old male with PMH that includes insomnia and bipolar 1 disorder. He came to the ED with abdominal pain, nausea, vomiting, and reported alcohol withdrawals. Pt also reports gaining 50 lbs over the past 6 months. He has been having severe insomnia for the past 30 days due to multiple stressors in his life. He was taking seroquel but it has not been working. He drinks immeasurable amounts of non-specific alcoholic beverages daily. He has not been able to keep anything down for the past two days. Pain is located in his left upper quadrant and is worse with palpation. The pain is described as cramping and started two days ago. Pt has had decreased intake and output over the past few days. He reports episodes of chills and sweats at home. Pt has allergy to ativan but is able to take diazepam. He has been admitted to hospital medicine and placed in the ICU.       Past Medical History:   Diagnosis Date    Hypertension     Insomnia        History reviewed. No pertinent surgical history.    Review of patient's allergies indicates:   Allergen Reactions    Ativan [lorazepam] Other (See Comments)     Muscle spasms in neck & back when taking medication.       No current facility-administered medications on file prior to encounter.     Current Outpatient  "Medications on File Prior to Encounter   Medication Sig    QUEtiapine (SEROQUEL) 300 MG Tab Take 1 tablet (300 mg total) by mouth nightly. for 10 days    [DISCONTINUED] gabapentin (NEURONTIN) 100 MG capsule Take 1 capsule (100 mg total) by mouth 3 (three) times daily.    dextroamphetamine-amphetamine (ADDERALL XR) 20 MG 24 hr capsule Take by mouth 2 (two) times daily.    [DISCONTINUED] phentermine (ADIPEX-P) 37.5 mg tablet Take 37.5 mg by mouth once daily.     Family History    None       Tobacco Use    Smoking status: Current Some Day Smoker     Types: Vaping with nicotine    Smokeless tobacco: Current User     Types: Chew   Substance and Sexual Activity    Alcohol use: Yes     Comment: reports "a lot" every night to go to sleep. will not report a number of drinks.    Drug use: No    Sexual activity: Yes     Review of Systems   Constitutional: Negative.  Negative for activity change, appetite change, chills and fever.   HENT: Negative.  Negative for congestion, mouth sores and trouble swallowing.    Eyes: Negative.  Negative for photophobia, pain and visual disturbance.   Respiratory: Negative.  Negative for cough, choking, chest tightness and shortness of breath.    Cardiovascular: Negative.  Negative for chest pain, palpitations and leg swelling.   Gastrointestinal:  Positive for abdominal distention, abdominal pain, nausea and vomiting. Negative for blood in stool, constipation and diarrhea.   Endocrine: Negative.  Negative for polydipsia, polyphagia and polyuria.   Genitourinary:  Positive for decreased urine volume. Negative for difficulty urinating, dysuria and enuresis.   Musculoskeletal: Negative.  Negative for arthralgias, back pain and gait problem.   Skin: Negative.  Negative for pallor and rash.   Allergic/Immunologic: Negative.  Negative for environmental allergies and food allergies.   Neurological: Negative.  Negative for dizziness, facial asymmetry, speech difficulty, weakness and " headaches.   Hematological: Negative.    Psychiatric/Behavioral: Negative.  Negative for agitation, behavioral problems and confusion.    Objective:     Vital Signs (Most Recent):  Temp: 99.3 °F (37.4 °C) (06/28/22 2302)  Pulse: (!) 122 (06/28/22 2302)  Resp: (!) 28 (06/28/22 2302)  BP: (!) 186/89 (06/28/22 2302)  SpO2: (!) 94 % (06/28/22 2302)   Vital Signs (24h Range):  Temp:  [98.7 °F (37.1 °C)-101.6 °F (38.7 °C)] 99.3 °F (37.4 °C)  Pulse:  [118-142] 122  Resp:  [20-37] 28  SpO2:  [94 %-97 %] 94 %  BP: (126-186)/() 186/89     Weight: (!) 164.2 kg (361 lb 15.9 oz)  Body mass index is 45.25 kg/m².    Physical Exam  Vitals reviewed.   Constitutional:       General: He is not in acute distress.     Appearance: Normal appearance.   HENT:      Head: Normocephalic and atraumatic.      Mouth/Throat:      Mouth: Mucous membranes are moist.      Pharynx: Oropharynx is clear. No posterior oropharyngeal erythema.   Eyes:      Extraocular Movements: Extraocular movements intact.      Pupils: Pupils are equal, round, and reactive to light.   Cardiovascular:      Rate and Rhythm: Regular rhythm. Tachycardia present.      Pulses: Normal pulses.      Heart sounds: Normal heart sounds. No murmur heard.  Pulmonary:      Effort: Pulmonary effort is normal. No respiratory distress.      Breath sounds: Normal breath sounds. No wheezing.   Abdominal:      General: Bowel sounds are normal. There is distension.      Palpations: Abdomen is soft.      Tenderness: There is abdominal tenderness. There is no guarding.   Musculoskeletal:         General: No swelling, tenderness or signs of injury. Normal range of motion.      Cervical back: Normal range of motion and neck supple. No rigidity or tenderness.   Skin:     General: Skin is warm and dry.   Neurological:      General: No focal deficit present.      Mental Status: He is alert and oriented to person, place, and time.      Cranial Nerves: No cranial nerve deficit.      Sensory: No  sensory deficit.      Motor: No weakness.   Psychiatric:         Mood and Affect: Mood normal. Affect is tearful.         Behavior: Behavior normal.         CRANIAL NERVES     CN III, IV, VI   Pupils are equal, round, and reactive to light.     Significant Labs: All pertinent labs within the past 24 hours have been reviewed.  A1C: No results for input(s): HGBA1C in the last 4320 hours.  Bilirubin:   Recent Labs   Lab 06/28/22  1547   BILITOT 2.4*     BMP:   Recent Labs   Lab 06/28/22  1547   GLU 71   *   K 3.4*   CL 87*   CO2 12*   BUN 15   CALCIUM 9.6     CBC:   Recent Labs   Lab 06/28/22  1547 06/28/22  1621   WBC 13.98*  --    HGB 13.5*  --    HCT 28.9* 28*     --      CMP:   Recent Labs   Lab 06/28/22  1547   *   K 3.4*   CL 87*   CO2 12*   GLU 71   BUN 15   CALCIUM 9.6   PROT 18.1*   ALBUMIN 3.1*   BILITOT 2.4*   ANIONGAP 19*     Lactic Acid:   Recent Labs   Lab 06/28/22  1641 06/28/22  2045   LACTATE 8.7* 9.2*     Lipase:   Recent Labs   Lab 06/28/22  1547   LIPASE 281*     Lipid Panel:   Recent Labs   Lab 06/28/22  1641   CHOL 1,101*   HDL <5*   LDLCALC Invalid, Trig>400.0   TRIG >5,680*   CHOLHDL Unable to calculate     Magnesium: No results for input(s): MG in the last 48 hours.  TSH: No results for input(s): TSH in the last 4320 hours.  Urine Studies:   Recent Labs   Lab 06/28/22  1803   COLORU Yellow   APPEARANCEUA Hazy*   PHUR 6.0   SPECGRAV >=1.030*   PROTEINUA 2+*   GLUCUA Negative   KETONESU Trace*   BILIRUBINUA 1+*   OCCULTUA 1+*   NITRITE Negative   UROBILINOGEN 1.0   LEUKOCYTESUR Negative   RBCUA 3   WBCUA 3   BACTERIA Occasional   SQUAMEPITHEL 3   HYALINECASTS 2*       Significant Imaging: I have reviewed all pertinent imaging results/findings within the past 24 hours.  US Abdomen Complete  Narrative: EXAMINATION:  US ABDOMEN COMPLETE    CLINICAL HISTORY:  ab pain/elevated bilirubin;    TECHNIQUE:  Abdominal ultrasound was performed.    COMPARISON:  None.    FINDINGS:  The liver  is enlarged measuring 26cm.  Hepatic parenchyma is diffusely echogenic in appearance which is most suggestive for diffuse fatty infiltration.  No intra- or extrahepatic biliary ductal dilatation. The common bile duct measures 0.5 cm.  The gallbladder appears normal. No evidence for cholelithiasis.  Sonographic Chirinos's sign is negative. Pancreas and aorta are largely obscured by overlying bowel gas.  Visualized portions of the IVC appear normal.  The spleen is normal in size measuring 8 cm. No ascites.  Kidneys measure 12 cm on the right and left with no evidence of hydronephrosis.  Impression: 1. No acute abnormalities identified.  2. Hepatomegaly with suspected hepatic steatosis.    Electronically signed by: Perico Herrera MD  Date:    06/28/2022  Time:    20:38  X-Ray Abdomen Flat And Erect  Narrative: EXAMINATION:  XR ABDOMEN FLAT AND ERECT    CLINICAL HISTORY:  Nausea with vomiting, unspecified    TECHNIQUE:  Flat and erect AP views of the abdomen were performed.    COMPARISON:  None    FINDINGS:  Nonspecific bowel gas pattern.  No evidence to suggest obstruction.  No free air identified.  Scattered stool is seen in the colon.  Partially visualized lung bases are clear.  Impression: Nonobstructive bowel gas pattern.    Electronically signed by: Perico Herrera MD  Date:    06/28/2022  Time:    17:22  X-Ray Chest PA And Lateral  Narrative: EXAMINATION:  XR CHEST PA AND LATERAL    CLINICAL HISTORY:  Fever, unspecified    TECHNIQUE:  PA and lateral views of the chest were performed.    COMPARISON:  None    FINDINGS:  Cardiac silhouette appears upper limits of normal in size however noting appearance is accentuated by hypoinflated lungs.  No evidence of focal consolidative process, pneumothorax, or significant pleural effusion.  No acute osseous abnormality identified.  Impression: No acute cardiopulmonary process identified.    Electronically signed by: Perico Herrera  MD  Date:    06/28/2022  Time:    17:22      Assessment/Plan:     * Hypertriglyceridemia  Lactic Acidosis  PseudoHyponatremia  Hypocalcemia  Preliminary trigs greater than 5,000 with tachycardia, lactic acidosis, and hypocalcemia.  Pt to be started on insulin drip and a 5% dextrose infusion with NS to maintain glucose levels and for fluid resuscitation. Goal is to infuse insulin at a rate of 0.1 units/kg/hour until trigs are less than 500 mg/dL. Check triglycerides every 12 hours. Due to ionized calcium of 0.77 pt ws ordered 1g calcium gluconate IV with repeat levels. It is difficult to get lab results on pt bc they have to be sent to main campus. Will monitor CMP. Trend Lactic acid.       Acute pancreatitis  Elevated trigs and ETOH induced. Lipase 281. Abdominal ultrasound was negative for biliary dilatation. Pancreas was obscured by bowel gas. IV fluid resuscitation in process. Will trend lipase. Pt NPO.        Alcohol dependence with withdrawal  Last drink 2 days ago. CIWA scale and symptom triggered benzodiazepine ordered.     .             VTE Risk Mitigation (From admission, onward)         Ordered     IP VTE HIGH RISK PATIENT  Once         06/28/22 2003     Place sequential compression device  Until discontinued         06/28/22 2003                   Ileana Uribe DNP  Department of Hospital Medicine   [facility] - Int[facility] ([place])

## 2022-06-29 NOTE — NURSING
D: pt and all vss/ room 380 now ready. Pt tx to 380. No acute signs of distress noted. Pt alerting family of tx.

## 2022-06-29 NOTE — ED NOTES
Attempted to give report; room hasn't been assigned to a nurse. Will call back for report when charge nurse assigns it.

## 2022-06-29 NOTE — PROCEDURES
"Lola Wetzel is a 37 y.o. male patient.    Temp: 99.3 °F (37.4 °C) (06/28/22 2302)  Pulse: (!) 122 (06/28/22 2302)  Resp: (!) 28 (06/28/22 2302)  BP: (!) 186/89 (06/28/22 2302)  SpO2: (!) 94 % (06/28/22 2302)  Weight: (!) 164.2 kg (361 lb 15.9 oz) (06/28/22 2114)  Height: 6' 3" (190.5 cm) (06/28/22 2114)    PICC  Date/Time: 6/29/2022 12:03 AM  Consent Done: Yes  Time out: Immediately prior to procedure a time out was called to verify the correct patient, procedure, equipment, support staff and site/side marked as required  Indications: med administration and vascular access  Anesthesia: local infiltration  Local anesthetic: lidocaine 1% without epinephrine  Anesthetic Total (mL): 3  Preparation: skin prepped with ChloraPrep  Skin prep agent dried: skin prep agent completely dried prior to procedure  Sterile barriers: all five maximum sterile barriers used - cap, mask, sterile gown, sterile gloves, and large sterile sheet  Hand hygiene: hand hygiene performed prior to central venous catheter insertion  Location details: right basilic  Catheter type: triple lumen  Catheter size: 5 Fr  Catheter Length: 49cm    Ultrasound guidance: yes  Vessel Caliber: medium and patent, compressibility normal  Vascular Doppler: not done  Needle advanced into vessel with real time Ultrasound guidance.  Sterile sheath used.  no esophageal manometryNumber of attempts: 1  Post-procedure: blood return through all ports, chlorhexidine patch and sterile dressing applied  Estimated blood loss (mL): 0  Specimens: No  Implants: No  Assessment: placement verified by x-ray and successful placement  Complications: none          Name SHAUNA Redding  6/29/2022  "
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
## 2022-06-29 NOTE — EICU
New Patient Evaluation    HPI:  37 M obese (BMI 44), history of hypertension presents with abdominal pain for the past 3 weeks with nausea and vomiting. He reports of drinking significant amounts of alcohol and also takes Seroquel to deal with his insomnia. Tachycardic and febrile on presentation. Lactic acid 9.2. Na 118, lipase 281. Given 2 liters NS in the ED.    Abdominal US No acute abnormalities identified. Hepatomegaly with suspected hepatic steatosis.    Camera Assessment:  Patient seen sitting upright in bed nauseated  /94    O2 97%    Data:  WBC 13.98, H/H 13.5/28.9, platelets   Na 118, K 3.4, CO2 12, AG 19  TB 2.4  Total cholesterol >1000, the rest unable to calculate  Urine trace ketones  CXR clear    Assessment and Plans:  · Called lab as there are missing results ie creatinine, triglycerides, LFTS. Was informed that blood was lipemic and blood is sent to [facility] and should have result soon. I suspect elevated triglycerides in which case patient will need to be started on insulin drip and will need dextrose. Will give thiamine now. Urine positive for ketones, alcohol vs starvation vs diabetic ketoacidosis. HbA1C pending.  · Hyponatremia from hypovolemia but also pseudohyponatremia from hypercholesterolemia      Follow-up:    Triglycerides > 5,000  Already started on insulin and dextrose as per bedside team. Already received thiamine prior to initiation of dextrose.

## 2022-06-30 PROBLEM — G93.41 ACUTE METABOLIC ENCEPHALOPATHY: Status: ACTIVE | Noted: 2022-06-30

## 2022-06-30 LAB
ALBUMIN SERPL BCP-MCNC: 1.8 G/DL (ref 3.5–5.2)
ALP SERPL-CCNC: 282 U/L (ref 55–135)
ALT SERPL W/O P-5'-P-CCNC: 82 U/L (ref 10–44)
ANION GAP SERPL CALC-SCNC: 13 MMOL/L (ref 8–16)
ANION GAP SERPL CALC-SCNC: 13 MMOL/L (ref 8–16)
ANISOCYTOSIS BLD QL SMEAR: SLIGHT
AST SERPL-CCNC: 105 U/L (ref 10–40)
BASOPHILS # BLD AUTO: 0.07 K/UL (ref 0–0.2)
BASOPHILS NFR BLD: 0.4 % (ref 0–1.9)
BILIRUB SERPL-MCNC: 2 MG/DL (ref 0.1–1)
BUN SERPL-MCNC: 5 MG/DL (ref 6–20)
BUN SERPL-MCNC: 7 MG/DL (ref 6–20)
CALCIUM SERPL-MCNC: 8.2 MG/DL (ref 8.7–10.5)
CALCIUM SERPL-MCNC: 8.5 MG/DL (ref 8.7–10.5)
CHLORIDE SERPL-SCNC: 96 MMOL/L (ref 95–110)
CHLORIDE SERPL-SCNC: 98 MMOL/L (ref 95–110)
CO2 SERPL-SCNC: 19 MMOL/L (ref 23–29)
CO2 SERPL-SCNC: 20 MMOL/L (ref 23–29)
CREAT SERPL-MCNC: 1.3 MG/DL (ref 0.5–1.4)
CREAT SERPL-MCNC: 1.3 MG/DL (ref 0.5–1.4)
DIFFERENTIAL METHOD: ABNORMAL
EOSINOPHIL # BLD AUTO: 0 K/UL (ref 0–0.5)
EOSINOPHIL NFR BLD: 0.2 % (ref 0–8)
ERYTHROCYTE [DISTWIDTH] IN BLOOD BY AUTOMATED COUNT: 16.5 % (ref 11.5–14.5)
EST. GFR  (AFRICAN AMERICAN): >60 ML/MIN/1.73 M^2
EST. GFR  (AFRICAN AMERICAN): >60 ML/MIN/1.73 M^2
EST. GFR  (NON AFRICAN AMERICAN): >60 ML/MIN/1.73 M^2
EST. GFR  (NON AFRICAN AMERICAN): >60 ML/MIN/1.73 M^2
GLUCOSE SERPL-MCNC: 101 MG/DL (ref 70–110)
GLUCOSE SERPL-MCNC: 144 MG/DL (ref 70–110)
HCT VFR BLD AUTO: 31.3 % (ref 40–54)
HGB BLD-MCNC: 10.6 G/DL (ref 14–18)
IMM GRANULOCYTES # BLD AUTO: 0.77 K/UL (ref 0–0.04)
IMM GRANULOCYTES NFR BLD AUTO: 4.2 % (ref 0–0.5)
LACTATE SERPL-SCNC: 2 MMOL/L (ref 0.5–2.2)
LACTATE SERPL-SCNC: 2.4 MMOL/L (ref 0.5–2.2)
LYMPHOCYTES # BLD AUTO: 1.3 K/UL (ref 1–4.8)
LYMPHOCYTES NFR BLD: 6.9 % (ref 18–48)
MCH RBC QN AUTO: 30.4 PG (ref 27–31)
MCHC RBC AUTO-ENTMCNC: 33.9 G/DL (ref 32–36)
MCV RBC AUTO: 90 FL (ref 82–98)
MONOCYTES # BLD AUTO: 0.8 K/UL (ref 0.3–1)
MONOCYTES NFR BLD: 4.5 % (ref 4–15)
NEUTROPHILS # BLD AUTO: 15.5 K/UL (ref 1.8–7.7)
NEUTROPHILS NFR BLD: 83.8 % (ref 38–73)
NRBC BLD-RTO: 0 /100 WBC
PHOSPHATE SERPL-MCNC: 1.7 MG/DL (ref 2.7–4.5)
PLATELET # BLD AUTO: 177 K/UL (ref 150–450)
PLATELET BLD QL SMEAR: ABNORMAL
PMV BLD AUTO: 10.1 FL (ref 9.2–12.9)
POCT GLUCOSE: 100 MG/DL (ref 70–110)
POCT GLUCOSE: 114 MG/DL (ref 70–110)
POCT GLUCOSE: 114 MG/DL (ref 70–110)
POCT GLUCOSE: 116 MG/DL (ref 70–110)
POCT GLUCOSE: 136 MG/DL (ref 70–110)
POCT GLUCOSE: 137 MG/DL (ref 70–110)
POCT GLUCOSE: 138 MG/DL (ref 70–110)
POCT GLUCOSE: 140 MG/DL (ref 70–110)
POCT GLUCOSE: 147 MG/DL (ref 70–110)
POCT GLUCOSE: 152 MG/DL (ref 70–110)
POCT GLUCOSE: 155 MG/DL (ref 70–110)
POCT GLUCOSE: 158 MG/DL (ref 70–110)
POCT GLUCOSE: 159 MG/DL (ref 70–110)
POCT GLUCOSE: 164 MG/DL (ref 70–110)
POCT GLUCOSE: 167 MG/DL (ref 70–110)
POCT GLUCOSE: 168 MG/DL (ref 70–110)
POCT GLUCOSE: 169 MG/DL (ref 70–110)
POCT GLUCOSE: 175 MG/DL (ref 70–110)
POCT GLUCOSE: 268 MG/DL (ref 70–110)
POTASSIUM SERPL-SCNC: 3.2 MMOL/L (ref 3.5–5.1)
POTASSIUM SERPL-SCNC: 3.2 MMOL/L (ref 3.5–5.1)
PROT SERPL-MCNC: 8.7 G/DL (ref 6–8.4)
RBC # BLD AUTO: 3.49 M/UL (ref 4.6–6.2)
SODIUM SERPL-SCNC: 128 MMOL/L (ref 136–145)
SODIUM SERPL-SCNC: 131 MMOL/L (ref 136–145)
TRIGL SERPL-MCNC: 1697 MG/DL (ref 30–150)
TRIGL SERPL-MCNC: 5059 MG/DL (ref 30–150)
WBC # BLD AUTO: 18.53 K/UL (ref 3.9–12.7)

## 2022-06-30 PROCEDURE — 25000242 PHARM REV CODE 250 ALT 637 W/ HCPCS: Performed by: HOSPITALIST

## 2022-06-30 PROCEDURE — 83605 ASSAY OF LACTIC ACID: CPT | Performed by: NURSE PRACTITIONER

## 2022-06-30 PROCEDURE — 84478 ASSAY OF TRIGLYCERIDES: CPT | Performed by: INTERNAL MEDICINE

## 2022-06-30 PROCEDURE — 84478 ASSAY OF TRIGLYCERIDES: CPT | Mod: 91 | Performed by: INTERNAL MEDICINE

## 2022-06-30 PROCEDURE — 99232 PR SUBSEQUENT HOSPITAL CARE,LEVL II: ICD-10-PCS | Mod: ,,, | Performed by: INTERNAL MEDICINE

## 2022-06-30 PROCEDURE — 25000003 PHARM REV CODE 250: Performed by: STUDENT IN AN ORGANIZED HEALTH CARE EDUCATION/TRAINING PROGRAM

## 2022-06-30 PROCEDURE — 25000003 PHARM REV CODE 250: Performed by: HOSPITALIST

## 2022-06-30 PROCEDURE — 99232 SBSQ HOSP IP/OBS MODERATE 35: CPT | Mod: ,,, | Performed by: INTERNAL MEDICINE

## 2022-06-30 PROCEDURE — 84100 ASSAY OF PHOSPHORUS: CPT | Performed by: STUDENT IN AN ORGANIZED HEALTH CARE EDUCATION/TRAINING PROGRAM

## 2022-06-30 PROCEDURE — A4216 STERILE WATER/SALINE, 10 ML: HCPCS | Performed by: HOSPITALIST

## 2022-06-30 PROCEDURE — 63600175 PHARM REV CODE 636 W HCPCS: Performed by: HOSPITALIST

## 2022-06-30 PROCEDURE — 83605 ASSAY OF LACTIC ACID: CPT | Mod: 91 | Performed by: STUDENT IN AN ORGANIZED HEALTH CARE EDUCATION/TRAINING PROGRAM

## 2022-06-30 PROCEDURE — 63600175 PHARM REV CODE 636 W HCPCS: Performed by: NURSE PRACTITIONER

## 2022-06-30 PROCEDURE — 99233 SBSQ HOSP IP/OBS HIGH 50: CPT | Mod: ,,, | Performed by: HOSPITALIST

## 2022-06-30 PROCEDURE — 99233 PR SUBSEQUENT HOSPITAL CARE,LEVL III: ICD-10-PCS | Mod: ,,, | Performed by: HOSPITALIST

## 2022-06-30 PROCEDURE — 63600175 PHARM REV CODE 636 W HCPCS: Performed by: STUDENT IN AN ORGANIZED HEALTH CARE EDUCATION/TRAINING PROGRAM

## 2022-06-30 PROCEDURE — C1751 CATH, INF, PER/CENT/MIDLINE: HCPCS

## 2022-06-30 PROCEDURE — 85025 COMPLETE CBC W/AUTO DIFF WBC: CPT | Performed by: NURSE PRACTITIONER

## 2022-06-30 PROCEDURE — 20000000 HC ICU ROOM

## 2022-06-30 PROCEDURE — 25000003 PHARM REV CODE 250: Performed by: NURSE PRACTITIONER

## 2022-06-30 PROCEDURE — 80048 BASIC METABOLIC PNL TOTAL CA: CPT | Mod: XB | Performed by: STUDENT IN AN ORGANIZED HEALTH CARE EDUCATION/TRAINING PROGRAM

## 2022-06-30 PROCEDURE — 80053 COMPREHEN METABOLIC PANEL: CPT | Performed by: NURSE PRACTITIONER

## 2022-06-30 RX ORDER — SODIUM,POTASSIUM PHOSPHATES 280-250MG
2 POWDER IN PACKET (EA) ORAL ONCE
Status: COMPLETED | OUTPATIENT
Start: 2022-06-30 | End: 2022-06-30

## 2022-06-30 RX ORDER — FENOFIBRATE 160 MG/1
160 TABLET ORAL DAILY
Status: DISCONTINUED | OUTPATIENT
Start: 2022-06-30 | End: 2022-07-07 | Stop reason: HOSPADM

## 2022-06-30 RX ORDER — POLYETHYLENE GLYCOL 3350 17 G/17G
17 POWDER, FOR SOLUTION ORAL 2 TIMES DAILY
Status: DISCONTINUED | OUTPATIENT
Start: 2022-06-30 | End: 2022-07-07 | Stop reason: HOSPADM

## 2022-06-30 RX ORDER — FUROSEMIDE 10 MG/ML
60 INJECTION INTRAMUSCULAR; INTRAVENOUS ONCE
Status: COMPLETED | OUTPATIENT
Start: 2022-06-30 | End: 2022-06-30

## 2022-06-30 RX ORDER — POTASSIUM CHLORIDE 20 MEQ/1
40 TABLET, EXTENDED RELEASE ORAL ONCE
Status: COMPLETED | OUTPATIENT
Start: 2022-06-30 | End: 2022-06-30

## 2022-06-30 RX ORDER — FUROSEMIDE 10 MG/ML
60 INJECTION INTRAMUSCULAR; INTRAVENOUS ONCE
Status: DISCONTINUED | OUTPATIENT
Start: 2022-06-30 | End: 2022-06-30

## 2022-06-30 RX ORDER — DEXTROSE MONOHYDRATE 100 MG/ML
INJECTION, SOLUTION INTRAVENOUS CONTINUOUS
Status: CANCELLED | OUTPATIENT
Start: 2022-06-30

## 2022-06-30 RX ORDER — ENOXAPARIN SODIUM 100 MG/ML
40 INJECTION SUBCUTANEOUS EVERY 24 HOURS
Status: DISCONTINUED | OUTPATIENT
Start: 2022-06-30 | End: 2022-07-07 | Stop reason: HOSPADM

## 2022-06-30 RX ORDER — BISACODYL 10 MG
10 SUPPOSITORY, RECTAL RECTAL ONCE
Status: COMPLETED | OUTPATIENT
Start: 2022-06-30 | End: 2022-06-30

## 2022-06-30 RX ADMIN — POLYETHYLENE GLYCOL 3350 17 G: 17 POWDER, FOR SOLUTION ORAL at 08:06

## 2022-06-30 RX ADMIN — INSULIN HUMAN 4 UNITS/HR: 1 INJECTION, SOLUTION INTRAVENOUS at 03:06

## 2022-06-30 RX ADMIN — DIAZEPAM 5 MG: 5 INJECTION, SOLUTION INTRAMUSCULAR; INTRAVENOUS at 01:06

## 2022-06-30 RX ADMIN — DIAZEPAM 5 MG: 5 INJECTION, SOLUTION INTRAMUSCULAR; INTRAVENOUS at 12:06

## 2022-06-30 RX ADMIN — THIAMINE HCL TAB 100 MG 100 MG: 100 TAB at 08:06

## 2022-06-30 RX ADMIN — FUROSEMIDE 60 MG: 10 INJECTION, SOLUTION INTRAMUSCULAR; INTRAVENOUS at 05:06

## 2022-06-30 RX ADMIN — DIAZEPAM 5 MG: 5 INJECTION, SOLUTION INTRAMUSCULAR; INTRAVENOUS at 08:06

## 2022-06-30 RX ADMIN — DEXMEDETOMIDINE HYDROCHLORIDE 0.4 MCG/KG/HR: 4 INJECTION, SOLUTION INTRAVENOUS at 07:06

## 2022-06-30 RX ADMIN — DEXTROSE AND SODIUM CHLORIDE: 5; .9 INJECTION, SOLUTION INTRAVENOUS at 04:06

## 2022-06-30 RX ADMIN — MORPHINE SULFATE 2 MG: 2 INJECTION, SOLUTION INTRAMUSCULAR; INTRAVENOUS at 08:06

## 2022-06-30 RX ADMIN — HYDROMORPHONE HYDROCHLORIDE 1 MG: 1 INJECTION, SOLUTION INTRAMUSCULAR; INTRAVENOUS; SUBCUTANEOUS at 06:06

## 2022-06-30 RX ADMIN — PROMETHAZINE HYDROCHLORIDE 25 MG: 25 INJECTION INTRAMUSCULAR; INTRAVENOUS at 03:06

## 2022-06-30 RX ADMIN — FOLIC ACID 1 MG: 1 TABLET ORAL at 08:06

## 2022-06-30 RX ADMIN — SODIUM CHLORIDE, PRESERVATIVE FREE 10 ML: 5 INJECTION INTRAVENOUS at 11:06

## 2022-06-30 RX ADMIN — SODIUM CHLORIDE, PRESERVATIVE FREE 10 ML: 5 INJECTION INTRAVENOUS at 01:06

## 2022-06-30 RX ADMIN — MORPHINE SULFATE 2 MG: 2 INJECTION, SOLUTION INTRAMUSCULAR; INTRAVENOUS at 04:06

## 2022-06-30 RX ADMIN — SODIUM CHLORIDE, PRESERVATIVE FREE 10 ML: 5 INJECTION INTRAVENOUS at 12:06

## 2022-06-30 RX ADMIN — THERA TABS 1 TABLET: TAB at 08:06

## 2022-06-30 RX ADMIN — SODIUM CHLORIDE, PRESERVATIVE FREE 10 ML: 5 INJECTION INTRAVENOUS at 06:06

## 2022-06-30 RX ADMIN — ONDANSETRON 4 MG: 2 INJECTION INTRAMUSCULAR; INTRAVENOUS at 10:06

## 2022-06-30 RX ADMIN — MORPHINE SULFATE 2 MG: 2 INJECTION, SOLUTION INTRAMUSCULAR; INTRAVENOUS at 11:06

## 2022-06-30 RX ADMIN — ENOXAPARIN SODIUM 40 MG: 40 INJECTION SUBCUTANEOUS at 04:06

## 2022-06-30 RX ADMIN — MORPHINE SULFATE 2 MG: 2 INJECTION, SOLUTION INTRAMUSCULAR; INTRAVENOUS at 02:06

## 2022-06-30 RX ADMIN — SODIUM CHLORIDE, PRESERVATIVE FREE 10 ML: 5 INJECTION INTRAVENOUS at 08:06

## 2022-06-30 RX ADMIN — DEXTROSE: 10 SOLUTION INTRAVENOUS at 10:06

## 2022-06-30 RX ADMIN — POTASSIUM & SODIUM PHOSPHATES POWDER PACK 280-160-250 MG 2 PACKET: 280-160-250 PACK at 08:06

## 2022-06-30 RX ADMIN — DEXMEDETOMIDINE HYDROCHLORIDE 0.6 MCG/KG/HR: 4 INJECTION, SOLUTION INTRAVENOUS at 11:06

## 2022-06-30 RX ADMIN — POTASSIUM CHLORIDE 40 MEQ: 1500 TABLET, EXTENDED RELEASE ORAL at 08:06

## 2022-06-30 RX ADMIN — DEXTROSE: 10 SOLUTION INTRAVENOUS at 11:06

## 2022-06-30 RX ADMIN — DEXMEDETOMIDINE HYDROCHLORIDE 0.3 MCG/KG/HR: 4 INJECTION, SOLUTION INTRAVENOUS at 05:06

## 2022-06-30 RX ADMIN — POLYETHYLENE GLYCOL 3350 17 G: 17 POWDER, FOR SOLUTION ORAL at 10:06

## 2022-06-30 RX ADMIN — ONDANSETRON 4 MG: 2 INJECTION INTRAMUSCULAR; INTRAVENOUS at 07:06

## 2022-06-30 RX ADMIN — FENOFIBRATE 160 MG: 160 TABLET ORAL at 10:06

## 2022-06-30 RX ADMIN — DEXMEDETOMIDINE HYDROCHLORIDE 0.4 MCG/KG/HR: 4 INJECTION, SOLUTION INTRAVENOUS at 01:06

## 2022-06-30 RX ADMIN — BISACODYL 10 MG: 10 SUPPOSITORY RECTAL at 10:06

## 2022-06-30 RX ADMIN — INSULIN HUMAN 6 UNITS/HR: 1 INJECTION, SOLUTION INTRAVENOUS at 11:06

## 2022-06-30 NOTE — HOSPITAL COURSE
Patient is a 37-year-old man with evidence of severe acute pancreatitis secondary to severe triglyceridemia and alcohol abuse. Patient presented with fever, leukocytosis, and severe lactic acidosis.  Severe inflammatory response likely due to acute pancreatitis without convincing evidence of a source of infection at this time.  Patient treated aggressive isotonic intravenous fluid resuscitation.  Lactic acidosis improving with volume resuscitation.       Patient with significant agitation overnight likely related to confusion related to metabolic encephalopathy and also alcohol withdrawal.  Patient started continuous dexmedetomidine with improvement in mental status/agitation. Psychiatry service consulted and recommended PEC due to grave debility related to substance abuse and concern for underlying mental illness.       Patient also started on continuous infusion to treat severe hypertriglyceridemia.   Patient abdominal pain improving and now tolerating low fat diet.    See below

## 2022-06-30 NOTE — PLAN OF CARE
Initial Discharge Planning Assessment:  Patient admitted on:6/28/22     Chart reviewed, Care plan discussed with treatment team,  attending      PCP updated in Epic: Dr. Janet Alaniz  Pharmacy, updated in Epic:[facility]     DME at home: None     Current dispo: Psych placement      Transportation:Will need transportation home if d/c from here.     Power of  or Living Will:      Anticipated DC needs from CM perspective:      Patient very lethargic and falling asleep during exam. Was able to get some questions answered.Patient is oriented and told me he just saw the  and will be going to another facility.. He continued to answer appropriately to all the above questions.   06/30/22 1507   Discharge Assessment   Assessment Type Discharge Planning Assessment   Confirmed/corrected address, phone number and insurance Yes   Confirmed Demographics Correct on Facesheet   Source of Information patient   Lives With alone   Do you expect to return to your current living situation? Yes   Prior to hospitilization cognitive status: Alert/Oriented   Current cognitive status: Alert/Oriented   Walking or Climbing Stairs Difficulty none   Dressing/Bathing Difficulty none   Equipment Currently Used at Home none   Readmission within 30 days? No   Do you currently have service(s) that help you manage your care at home? No   Do you take prescription medications? Yes   Do you have prescription coverage? Yes   Do you have any problems affording any of your prescribed medications? TBD   How do you get to doctors appointments? other (see comments)  (Will need transportation home)   Are you on dialysis? No   Do you take coumadin? No   DME Needed Upon Discharge  none   Discharge Plan discussed with: Patient   Discharge Barriers Identified Mental illness   [facility] ([place])  Initial Discharge Assessment       Primary Care Provider: Primary Doctor No    Admission Diagnosis: Dehydration  [E86.0]  Hypokalemia [E87.6]  Alcohol abuse [F10.10]  Hyponatremia [E87.1]  Nausea & vomiting [R11.2]  Fever [R50.9]  Chest pain [R07.9]  Non-intractable vomiting with nausea, unspecified vomiting type [R11.2]  Acute pancreatitis, unspecified complication status, unspecified pancreatitis type [K85.90]    Admission Date: 6/28/2022  Expected Discharge Date:     Discharge Barriers Identified: (P) Mental illness    Payor: MEDICAID / Plan: [place] [employer] / Product Type: Managed Medicaid /     Extended Emergency Contact Information  Primary Emergency Contact: No,One   [facility]  Home Phone: [phone]  Relation: Other            No Pharmacies Listed    Initial Assessment (most recent)       Adult Discharge Assessment - 06/30/22 1507          Discharge Assessment    Assessment Type Discharge Planning Assessment (P)      Confirmed/corrected address, phone number and insurance Yes (P)      Confirmed Demographics Correct on Facesheet (P)      Source of Information patient (P)      Lives With alone (P)      Do you expect to return to your current living situation? Yes (P)      Prior to hospitilization cognitive status: Alert/Oriented (P)      Current cognitive status: Alert/Oriented (P)      Walking or Climbing Stairs Difficulty none (P)      Dressing/Bathing Difficulty none (P)      Equipment Currently Used at Home none (P)      Readmission within 30 days? No (P)      Do you currently have service(s) that help you manage your care at home? No (P)      Do you take prescription medications? Yes (P)      Do you have prescription coverage? Yes (P)      Do you have any problems affording any of your prescribed medications? TBD (P)      How do you get to doctors appointments? other (see comments) (P)    Will need transportation home    Are you on dialysis? No (P)      Do you take coumadin? No (P)      DME Needed Upon Discharge  none (P)      Discharge Plan discussed with: Patient (P)      Discharge Barriers  Identified Mental illness (P)

## 2022-06-30 NOTE — ASSESSMENT & PLAN NOTE
Severe acute pancreatitis secondary to alcohol abuse and severe hypertriglyceridemia.  Ultrasound negative for biliary obstruction.  Serial triglycerides trending down with continuous insulin infusion.  Continue continuous insulin infusion unless triglycerides less than <500 mg/dL.  CT scan of the abdomen pelvis reveals retroperitoneal edema consistent with acute pancreatitis but otherwise no focal fluid collection.  Patient also with evidence of hepatomegaly with hepatic steatosis.  CT does not show evidence of bowel obstruction with normal caliber of the gastrostomy tract speaking against ileus/obstruction.  Fevers improving.  Significant abdominal distention likely related to 3rd spacing from acute acute pancreatitis but also related to obesity.  Will attempt to slowly advance site as tolerated.

## 2022-06-30 NOTE — SUBJECTIVE & OBJECTIVE
Interval History: Patient much calmer this morning.  Pain better controlled.  Tolerating clear liquids.  Abdominal distention present.  Last fever on 6/29/2022 at 11:43 am.  Patient reports ongoing insomnia.     Review of Systems   Constitutional:  Negative for chills and fever.   Respiratory:  Negative for shortness of breath.    Cardiovascular:  Negative for chest pain.   Gastrointestinal:  Positive for abdominal distention and abdominal pain. Negative for constipation, diarrhea, nausea and vomiting.     Objective:     Vital Signs (Most Recent):  Temp: 98.5 °F (36.9 °C) (06/30/22 0600)  Pulse: 82 (06/30/22 0630)  Resp: (!) 28 (06/30/22 0802)  BP: (!) 151/84 (06/30/22 0600)  SpO2: 95 % (06/30/22 0630)   Vital Signs (24h Range):  Temp:  [97.7 °F (36.5 °C)-101.1 °F (38.4 °C)] 98.5 °F (36.9 °C)  Pulse:  [] 82  Resp:  [16-38] 28  SpO2:  [91 %-96 %] 95 %  BP: (109-164)/() 151/84     Weight: (!) 164.2 kg (361 lb 15.9 oz)  Body mass index is 45.25 kg/m².    Intake/Output Summary (Last 24 hours) at 6/30/2022 0820  Last data filed at 6/30/2022 0636  Gross per 24 hour   Intake 5351.18 ml   Output 3770 ml   Net 1581.18 ml      Physical Exam  Constitutional:       Appearance: He is obese.   Cardiovascular:      Rate and Rhythm: Normal rate and regular rhythm.      Heart sounds: Normal heart sounds. No murmur heard.    No friction rub. No gallop.   Pulmonary:      Effort: Pulmonary effort is normal. No respiratory distress.      Breath sounds: Normal breath sounds. No wheezing or rales.   Abdominal:      General: Bowel sounds are decreased. There is distension.      Palpations: Abdomen is soft.      Tenderness: There is abdominal tenderness.   Musculoskeletal:         General: No tenderness. Normal range of motion.   Skin:     General: Skin is warm and dry.      Coloration: Skin is not pale.      Findings: No erythema or rash.   Neurological:      Mental Status: He is alert and oriented to person, place, and time.        Significant Labs: All pertinent labs within the past 24 hours have been reviewed.    Significant Imaging: I have reviewed all pertinent imaging results/findings within the past 24 hours.

## 2022-06-30 NOTE — ASSESSMENT & PLAN NOTE
Secondary to severe inflammatory response from acute pancreatitis, alcohol abuse/withdrawal, and possibly underlying mental illness.  Agitation much better on dexmedetomidine infusion.  Will attempt to wean down/off today.  Psychiatry evaluated patient recommended physician emergency certificate.

## 2022-06-30 NOTE — PROGRESS NOTES
[facility] Intensive Care [facility]  Gastroenterology  Progress Note    Patient Name: Lola Wetzel  MRN: [id number]  Admission Date: 6/28/2022  Hospital Length of Stay: 2 days  Code Status: Full Code   Attending Provider: Nba Johnson MD  Primary Care Physician: Primary Doctor No  Principal Problem: Acute pancreatitis    Subjective:     CC= acute pancreatitis    Interval History:  The patient reports some improvement in his epigastric abdominal pain.  Overall, he is tolerating his clear liquid diet, but says that when he drinks a lot of liquids there is a heaviness in the epigastrium.  There has been no nausea or vomiting.  No bowel movement since admission.       Review of systems:  General: Negative for fevers, chills.  Cardiovascular:  Negative for chest pain, shortness of breath     Objective:     Vital Signs (Most Recent):  Temp: 98.5 °F (36.9 °C) (06/30/22 0600)  Pulse: 82 (06/30/22 0630)  Resp: (!) 28 (06/30/22 0802)  BP: (!) 151/84 (06/30/22 0600)  SpO2: 95 % (06/30/22 0630) Vital Signs (24h Range):  Temp:  [97.7 °F (36.5 °C)-101.1 °F (38.4 °C)] 98.5 °F (36.9 °C)  Pulse:  [] 82  Resp:  [16-38] 28  SpO2:  [91 %-96 %] 95 %  BP: (109-164)/() 151/84     Physical examination:  GEN: Well developed, well nourished in no apparent distress   HENT: Normocephalic, anicteric sclera   Cardiovascular: Regular rate and rhythm. No murmurs appreciated.   Chest: Non-labored respirations. Breath sounds equal   Abdomen:  Protuberant, minimal epigastric tenderness, hypoactive BS  Psych: Appropriate mood and affect.   Extermities: No C/C/E.   Skin: No new visible or palpable lesions.     CBC:   Recent Labs   Lab 06/28/22  1547 06/28/22  1621 06/29/22  0302 06/30/22  0445   WBC 13.98*  --  17.70* 18.53*   HGB 13.5*  --  11.7* 10.6*   HCT 28.9* 28* 31.8* 31.3*     --  190 177     CMP:   Recent Labs   Lab 06/30/22  0445   *   CALCIUM 8.5*   ALBUMIN 1.8*   PROT 8.7*   *   K 3.2*   CO2 19*   CL 96    BUN 7   CREATININE 1.3   ALKPHOS 282*   ALT 82*   *   BILITOT 2.0*         TGL: >5680 -->1697      CT:  FINDINGS:  The lung bases show subsegmental atelectasis.  Right lower lobe pulmonary micro nodules measure up to 6 mm.     The liver shows diffuse decreased parenchymal attenuation..  Gallbladder contains high attenuation material which may represent sludge.  Normal size spleen.     Retroperitoneal soft tissue edema largely situated along the pancreas.  Stranding extends into the pelvis.  No focal fluid collection.     Adrenal glands are normal.  Kidneys concentrate contrast appropriately.  The urinary bladder is unremarkable.     Aorta is normal caliber.  No retroperitoneal or mesenteric lymph node enlargement seen.     Stomach and loops of bowel are normal caliber.  Visualized appendix is normal caliber with a few tiny phleboliths.  No significant free fluid in the pelvis.     Regional skeleton is intact.     Impression:     Retroperitoneal edema felt to represent acute pancreatitis.  Please correlate with laboratory values.  No focal fluid collection.     Hepatomegaly with hepatic steatosis.      Assessment:   37 y.o. man with hx of bipolar disorder and alcohol abuse admitted with severe pancreatitis 2/2 hypertriglyceridemia and alcohol.  His symptoms are improving. CT unremarkable.    Plan:   -continue IV fluids and insulin drip  -monitor triglycerides  -if triglycerides do not continue to improve, plasmapheresis can be considered  -trend CBC, CMP, lactate  -multivitamin, thiamine and folate  -at some point, the patient will need an evaluation by Endocrinology.    Discussed with Dr. Alex Frank MD  Gastroenterology  [facility] ([place])

## 2022-06-30 NOTE — PROGRESS NOTES
[facility] Pulmonary/Critical Care Progress Note      Patient: Lola Wetzel  Age: 37 y.o.  MRN: [id number]  Admit date: 2022  LOS: 2 day(s)     SUBJECTIVE:       Patient resting comfortably at time of exam, on RA. Last fever 101.1 on  at 11:43 am.     OBJECTIVE DATA:      Vital Signs:  Vitals:    22 0802   BP:    Pulse:    Resp: (!) 28   Temp:      Physical Exam:  Constitutional: cooperative, calm, obese  HEENT: NCAT, EOMI, MMM  Neck: Supple, normal ROM  Resp: CTABL, no wheeze  Cardio: RRR, S1 and S2 normal  GI: Soft, non-tender, bowel sounds active  Extremities: No edema, peripheral pulses 2+ and symmetric  Skin: No rashes, bruises, or lesions  Neurologic: A&O x3, follows commands     Laboratory/Imaging:  Recent Labs   Lab 22  1547 22  1621 22  2045 22  0302 22  1303 22  0445   WBC 13.98*  --   --  17.70*  --  18.53*   HGB 13.5*  --   --  11.7*  --  10.6*   HCT 28.9* 28*  --  31.8*  --  31.3*     --   --  190  --  177   *  --    < > 128* 128* 128*   K 3.4*  --    < > 3.8 3.6 3.2*   CL 87*  --    < > 96 97 96   CREATININE 1.1  --    < > 1.3 1.4 1.3   BUN 15  --    < > 11 11 7   CO2 12*  --    < > 21* 19* 19*   *  --    < > 103* 87* 82*   *  --    < > 255* 168* 105*    < > = values in this interval not displayed.     Microbiology:  22 Blood Cx: NGTD     Imagin22 CT Abd Pelvis: Retroperitoneal edema felt to represent acute pancreatitis. Please correlate with laboratory values. No focal fluid collection. Hepatomegaly with hepatic steatosis.    Medications:  Scheduled:   enoxaparin  40 mg Subcutaneous Daily    folic acid  1 mg Oral Daily    multivitamin  1 tablet Oral Daily    sodium chloride 0.9%  10 mL Intravenous Q6H    thiamine  100 mg Oral Daily      Continuous:   dexmedetomidine (PRECEDEX) infusion 0.4 mcg/kg/hr (22 0757)    dextrose 10 % in water (D10W) 100 mL/hr at 22 0636    dextrose 5 % and 0.9 % NaCl 75  mL/hr at 06/30/22 0636    insulin regular 1 units/mL infusion orderable (DKA) 0.7 Units/hr (06/30/22 0700)      PRN:  acetaminophen, dextrose 10%, dextrose 10%, diazePAM, glucagon (human recombinant), glucose, glucose, HYDROmorphone, labetaloL, morphine, naloxone, ondansetron, polyethylene glycol, promethazine (PHENERGAN) IVPB, Flushing PICC Protocol **AND** sodium chloride 0.9% **AND** sodium chloride 0.9%    Antibiotics:  None     Assessment/Plan:     Neuro/Psych  Bipolar 1 Disorder   - previously on seroquel, not on any meds at home presently  - psychiatry following     ETOH Abuse  - last drink 4 days ago, not currently in withdrawals   - on precedex gtt, weaning today  - valium 5mg IV q4h prn withdrawal symptoms   - would schedule valium if actively begins withdrawing   - continue thiamine, folate, MVI     Suicidal Ideation  - intermittent thoughts of SI per psych eval on admission  - PEC in place  - tele-psychiatry following     Cardiovascular  HTN  - has labetalol prn      Respiratory  - no active issues, on RA     GI/FEN  Acute Pancreatitis 2/2 Hypertriglyceridemia and ETOH Abuse  - CT AP without evidence of pancreatic necrosis or fluid collection  - continue insulin drip and D10 infusion  - q12h TG levels, turn off insulin and D10 when TG < 500  - GI following  - advance diet as tolerated     F: D10 infusion  E: replete prn  N: clear liquid diet     Renal   Lactic Acidosis, Improving  - LA 9.2 on admit, down-trending with fluids     Heme  - no active issues     Endocrine  - BG goal 140s-180s  - on D10 and insulin gtt     Infectious Disease  - low suspicion for active infection, suspect leukocytosis related to his acute pancreatitis  - UA and CXR without signs of infection  - CT AP without evidence of pancreatic necrosis or fluid collection  - blood cultures NGTD     Feeding: clear liquid diet  Analgesia: tylenol prn, dilaudid prn  Sedation: precedex  Thrombo PPX: lovenox  Head of Bed: > 30 degrees  Ulcer  PPX: none  Glucose: on insulin gtt   SBT/SAT: N/A  Bowel Regimen: glycolax  Indwelling Lines: PIV, picc  Abx: none    Code: Full       Karen Lincoln MD  [facility] Pulmonary/Critical Care Fellow  06/30/2022 8:59 AM  [phone]

## 2022-06-30 NOTE — PLAN OF CARE
During routine rounds and at nurse's request, assessed pt for spiritual distress, and assured pt aware  of spiritual care available.  While providing reflective listening and compassionate presence, pt concerns were explored.  Significant distress was reported and presented regarding his status being PEC'd and what that meant for his healthcare plan going forward .  This was indicated by pt affect and feedback.     Spiritual care included  sharing pt's life review, providing spiritual counseling, and reporting 's prayers for healing and peace.   Gratitude for spiritual care was reported by pt.   Pt reported and presented with little/no support.  Follow-up was offered upon request, and is indicated;   will provide further spiritual care after an appropriate interval and as soon as scheduling permits.  Pt's nurse was informed that pt had reported many questions about his care plan, and nurse reported he would see to what was needed to get pt information he desired.

## 2022-06-30 NOTE — H&P
"[facility] Intensive [facility] Medicine  History & Physical    Patient Name: oLla Wetzel  MRN: [id number]  Patient Class: IP- Inpatient  Admission Date: 6/28/2022  Attending Physician: Nba Johnson MD   Primary Care Provider: Primary Doctor No         Patient information was obtained from patient, past medical records and ER records.     Subjective:     Principal Problem:Acute pancreatitis    Chief Complaint:   Chief Complaint   Patient presents with    Abdominal Pain     Abd pain with N/V. Pt states he is depressed and has not slept in three weeks. Pt has been on a herrera for the past month and is currently going through withdrawls from ETOH.         HPI: Per Ileana Uribe, DNP:    "Mr Davila is a 37 yr old male with PMH that includes insomnia and bipolar 1 disorder. He came to the ED with abdominal pain, nausea, vomiting, and reported alcohol withdrawals. Pt also reports gaining 50 lbs over the past 6 months. He has been having severe insomnia for the past 30 days due to multiple stressors in his life. He was taking seroquel but it has not been working. He drinks immeasurable amounts of non-specific alcoholic beverages daily. He has not been able to keep anything down for the past two days. Pain is located in his left upper quadrant and is worse with palpation. The pain is described as cramping and started two days ago. Pt has had decreased intake and output over the past few days. He reports episodes of chills and sweats at home. Pt has allergy to ativan but is able to take diazepam. He has been admitted to hospital medicine and placed in the ICU."      Interval History: Patient much calmer this morning.  Pain better controlled.  Tolerating clear liquids.  Abdominal distention present.  Last fever on 6/29/2022 at 11:43 am.  Patient reports ongoing insomnia.     Review of Systems   Constitutional:  Negative for chills and fever.   Respiratory:  Negative for shortness of breath.  "   Cardiovascular:  Negative for chest pain.   Gastrointestinal:  Positive for abdominal distention and abdominal pain. Negative for constipation, diarrhea, nausea and vomiting.     Objective:     Vital Signs (Most Recent):  Temp: 98.5 °F (36.9 °C) (06/30/22 0600)  Pulse: 82 (06/30/22 0630)  Resp: (!) 28 (06/30/22 0802)  BP: (!) 151/84 (06/30/22 0600)  SpO2: 95 % (06/30/22 0630)   Vital Signs (24h Range):  Temp:  [97.7 °F (36.5 °C)-101.1 °F (38.4 °C)] 98.5 °F (36.9 °C)  Pulse:  [] 82  Resp:  [16-38] 28  SpO2:  [91 %-96 %] 95 %  BP: (109-164)/() 151/84     Weight: (!) 164.2 kg (361 lb 15.9 oz)  Body mass index is 45.25 kg/m².    Intake/Output Summary (Last 24 hours) at 6/30/2022 0820  Last data filed at 6/30/2022 0636  Gross per 24 hour   Intake 5351.18 ml   Output 3770 ml   Net 1581.18 ml      Physical Exam  Constitutional:       Appearance: He is obese.   Cardiovascular:      Rate and Rhythm: Normal rate and regular rhythm.      Heart sounds: Normal heart sounds. No murmur heard.    No friction rub. No gallop.   Pulmonary:      Effort: Pulmonary effort is normal. No respiratory distress.      Breath sounds: Normal breath sounds. No wheezing or rales.   Abdominal:      General: Bowel sounds are decreased. There is distension.      Palpations: Abdomen is soft.      Tenderness: There is abdominal tenderness.   Musculoskeletal:         General: No tenderness. Normal range of motion.   Skin:     General: Skin is warm and dry.      Coloration: Skin is not pale.      Findings: No erythema or rash.   Neurological:      Mental Status: He is alert and oriented to person, place, and time.       Significant Labs: All pertinent labs within the past 24 hours have been reviewed.    Significant Imaging: I have reviewed all pertinent imaging results/findings within the past 24 hours.    Assessment/Plan:     * Acute pancreatitis  Severe acute pancreatitis secondary to alcohol abuse and severe hypertriglyceridemia.   Ultrasound negative for biliary obstruction.  Serial triglycerides trending down with continuous insulin infusion.  Continue continuous insulin infusion unless triglycerides less than <500 mg/dL.  CT scan of the abdomen pelvis reveals retroperitoneal edema consistent with acute pancreatitis but otherwise no focal fluid collection.  Patient also with evidence of hepatomegaly with hepatic steatosis.  CT does not show evidence of bowel obstruction with normal caliber of the gastrostomy tract speaking against ileus/obstruction.  Fevers improving.  Significant abdominal distention likely related to 3rd spacing from acute acute pancreatitis but also related to obesity.  Will attempt to slowly advance site as tolerated.    Acute metabolic encephalopathy  Secondary to severe inflammatory response from acute pancreatitis, alcohol abuse/withdrawal, and possibly underlying mental illness.  Agitation much better on dexmedetomidine infusion.  Will attempt to wean down/off today.  Psychiatry evaluated patient recommended physician emergency certificate.    Lactic acidosis  Likely secondary to severe acute pancreatitis.  Serial lactic acid levels trending down with volume resuscitation.    Hypertriglyceridemia  Continue to correct marked hypertriglyceridemia with continuous insulin infusion.    Alcohol dependence with withdrawal  Mental status much better today.  Patient calm and cooperative.  Will attempt to wean dexmedetomidine.       VTE Risk Mitigation (From admission, onward)         Ordered     enoxaparin injection 40 mg  Daily         06/30/22 0846     IP VTE HIGH RISK PATIENT  Once         06/28/22 2003     Place sequential compression device  Until discontinued         06/28/22 2003                   Nba Johnson MD  Department of Hospital Medicine   [facility] - Intensive Care ([place])

## 2022-06-30 NOTE — ASSESSMENT & PLAN NOTE
Mental status much better today.  Patient calm and cooperative.  Will attempt to wean dexmedetomidine.

## 2022-06-30 NOTE — ASSESSMENT & PLAN NOTE
Likely secondary to severe acute pancreatitis.  Serial lactic acid levels trending down with volume resuscitation.

## 2022-07-01 LAB
ALBUMIN SERPL BCP-MCNC: 1.7 G/DL (ref 3.5–5.2)
ALP SERPL-CCNC: 249 U/L (ref 55–135)
ALT SERPL W/O P-5'-P-CCNC: 57 U/L (ref 10–44)
ANION GAP SERPL CALC-SCNC: 15 MMOL/L (ref 8–16)
ANISOCYTOSIS BLD QL SMEAR: SLIGHT
AST SERPL-CCNC: 101 U/L (ref 10–40)
BASOPHILS # BLD AUTO: ABNORMAL K/UL (ref 0–0.2)
BASOPHILS NFR BLD: 0 % (ref 0–1.9)
BILIRUB SERPL-MCNC: 1.5 MG/DL (ref 0.1–1)
BUN SERPL-MCNC: 4 MG/DL (ref 6–20)
CALCIUM SERPL-MCNC: 7.9 MG/DL (ref 8.7–10.5)
CHLORIDE SERPL-SCNC: 96 MMOL/L (ref 95–110)
CK SERPL-CCNC: 377 U/L (ref 20–200)
CO2 SERPL-SCNC: 18 MMOL/L (ref 23–29)
CREAT SERPL-MCNC: 1.5 MG/DL (ref 0.5–1.4)
DIFFERENTIAL METHOD: ABNORMAL
EOSINOPHIL # BLD AUTO: ABNORMAL K/UL (ref 0–0.5)
EOSINOPHIL NFR BLD: 5 % (ref 0–8)
ERYTHROCYTE [DISTWIDTH] IN BLOOD BY AUTOMATED COUNT: 17 % (ref 11.5–14.5)
EST. GFR  (AFRICAN AMERICAN): >60 ML/MIN/1.73 M^2
EST. GFR  (NON AFRICAN AMERICAN): 59 ML/MIN/1.73 M^2
GLUCOSE SERPL-MCNC: 89 MG/DL (ref 70–110)
HCT VFR BLD AUTO: 27.7 % (ref 40–54)
HGB BLD-MCNC: 9.2 G/DL (ref 14–18)
IMM GRANULOCYTES # BLD AUTO: ABNORMAL K/UL (ref 0–0.04)
IMM GRANULOCYTES NFR BLD AUTO: ABNORMAL % (ref 0–0.5)
LYMPHOCYTES # BLD AUTO: ABNORMAL K/UL (ref 1–4.8)
LYMPHOCYTES NFR BLD: 7 % (ref 18–48)
MCH RBC QN AUTO: 29.9 PG (ref 27–31)
MCHC RBC AUTO-ENTMCNC: 33.2 G/DL (ref 32–36)
MCV RBC AUTO: 90 FL (ref 82–98)
METAMYELOCYTES NFR BLD MANUAL: 2 %
MONOCYTES # BLD AUTO: ABNORMAL K/UL (ref 0.3–1)
MONOCYTES NFR BLD: 2 % (ref 4–15)
MYELOCYTES NFR BLD MANUAL: 1 %
NEUTROPHILS NFR BLD: 73 % (ref 38–73)
NEUTS BAND NFR BLD MANUAL: 10 %
NRBC BLD-RTO: 1 /100 WBC
OSMOLALITY SERPL: 271 MOSM/KG (ref 280–300)
OSMOLALITY UR: 54 MOSM/KG (ref 50–1200)
PLATELET # BLD AUTO: 213 K/UL (ref 150–450)
PLATELET BLD QL SMEAR: ABNORMAL
PMV BLD AUTO: 12 FL (ref 9.2–12.9)
POCT GLUCOSE: 103 MG/DL (ref 70–110)
POCT GLUCOSE: 107 MG/DL (ref 70–110)
POCT GLUCOSE: 108 MG/DL (ref 70–110)
POCT GLUCOSE: 109 MG/DL (ref 70–110)
POCT GLUCOSE: 112 MG/DL (ref 70–110)
POCT GLUCOSE: 113 MG/DL (ref 70–110)
POCT GLUCOSE: 113 MG/DL (ref 70–110)
POCT GLUCOSE: 116 MG/DL (ref 70–110)
POCT GLUCOSE: 117 MG/DL (ref 70–110)
POCT GLUCOSE: 121 MG/DL (ref 70–110)
POCT GLUCOSE: 127 MG/DL (ref 70–110)
POCT GLUCOSE: 130 MG/DL (ref 70–110)
POCT GLUCOSE: 81 MG/DL (ref 70–110)
POCT GLUCOSE: 88 MG/DL (ref 70–110)
POCT GLUCOSE: 92 MG/DL (ref 70–110)
POCT GLUCOSE: 94 MG/DL (ref 70–110)
POCT GLUCOSE: 98 MG/DL (ref 70–110)
POCT GLUCOSE: 99 MG/DL (ref 70–110)
POCT GLUCOSE: >500 MG/DL (ref 70–110)
POIKILOCYTOSIS BLD QL SMEAR: SLIGHT
POTASSIUM SERPL-SCNC: 3.7 MMOL/L (ref 3.5–5.1)
PROT SERPL-MCNC: 7.4 G/DL (ref 6–8.4)
RBC # BLD AUTO: 3.08 M/UL (ref 4.6–6.2)
SMUDGE CELLS BLD QL SMEAR: PRESENT
SODIUM SERPL-SCNC: 129 MMOL/L (ref 136–145)
SODIUM UR-SCNC: <20 MMOL/L (ref 20–250)
TARGETS BLD QL SMEAR: ABNORMAL
TRIGL SERPL-MCNC: 3473 MG/DL (ref 30–150)
TRIGL SERPL-MCNC: >5680 MG/DL (ref 30–150)
TSH SERPL DL<=0.005 MIU/L-ACNC: 1.04 UIU/ML (ref 0.4–4)
WBC # BLD AUTO: 15.86 K/UL (ref 3.9–12.7)
WBC TOXIC VACUOLES BLD QL SMEAR: PRESENT

## 2022-07-01 PROCEDURE — 63600175 PHARM REV CODE 636 W HCPCS: Performed by: HOSPITALIST

## 2022-07-01 PROCEDURE — 25000003 PHARM REV CODE 250: Performed by: HOSPITALIST

## 2022-07-01 PROCEDURE — 20000000 HC ICU ROOM

## 2022-07-01 PROCEDURE — 99291 PR CRITICAL CARE, E/M 30-74 MINUTES: ICD-10-PCS | Mod: ,,, | Performed by: HOSPITALIST

## 2022-07-01 PROCEDURE — 82550 ASSAY OF CK (CPK): CPT | Performed by: STUDENT IN AN ORGANIZED HEALTH CARE EDUCATION/TRAINING PROGRAM

## 2022-07-01 PROCEDURE — 84300 ASSAY OF URINE SODIUM: CPT | Performed by: STUDENT IN AN ORGANIZED HEALTH CARE EDUCATION/TRAINING PROGRAM

## 2022-07-01 PROCEDURE — 85027 COMPLETE CBC AUTOMATED: CPT | Performed by: NURSE PRACTITIONER

## 2022-07-01 PROCEDURE — 83935 ASSAY OF URINE OSMOLALITY: CPT | Performed by: STUDENT IN AN ORGANIZED HEALTH CARE EDUCATION/TRAINING PROGRAM

## 2022-07-01 PROCEDURE — 25000242 PHARM REV CODE 250 ALT 637 W/ HCPCS: Performed by: HOSPITALIST

## 2022-07-01 PROCEDURE — 25000003 PHARM REV CODE 250: Performed by: NURSE PRACTITIONER

## 2022-07-01 PROCEDURE — 25000003 PHARM REV CODE 250: Performed by: INTERNAL MEDICINE

## 2022-07-01 PROCEDURE — 80053 COMPREHEN METABOLIC PANEL: CPT | Performed by: NURSE PRACTITIONER

## 2022-07-01 PROCEDURE — 84478 ASSAY OF TRIGLYCERIDES: CPT | Performed by: INTERNAL MEDICINE

## 2022-07-01 PROCEDURE — 84443 ASSAY THYROID STIM HORMONE: CPT | Performed by: STUDENT IN AN ORGANIZED HEALTH CARE EDUCATION/TRAINING PROGRAM

## 2022-07-01 PROCEDURE — 99291 CRITICAL CARE FIRST HOUR: CPT | Mod: ,,, | Performed by: HOSPITALIST

## 2022-07-01 PROCEDURE — 85007 BL SMEAR W/DIFF WBC COUNT: CPT | Performed by: NURSE PRACTITIONER

## 2022-07-01 PROCEDURE — 83930 ASSAY OF BLOOD OSMOLALITY: CPT | Performed by: HOSPITALIST

## 2022-07-01 PROCEDURE — 25000003 PHARM REV CODE 250: Performed by: STUDENT IN AN ORGANIZED HEALTH CARE EDUCATION/TRAINING PROGRAM

## 2022-07-01 PROCEDURE — A4216 STERILE WATER/SALINE, 10 ML: HCPCS | Performed by: HOSPITALIST

## 2022-07-01 PROCEDURE — 63600175 PHARM REV CODE 636 W HCPCS: Performed by: NURSE PRACTITIONER

## 2022-07-01 RX ORDER — FUROSEMIDE 10 MG/ML
40 INJECTION INTRAMUSCULAR; INTRAVENOUS ONCE
Status: COMPLETED | OUTPATIENT
Start: 2022-07-01 | End: 2022-07-01

## 2022-07-01 RX ADMIN — DEXTROSE: 10 SOLUTION INTRAVENOUS at 02:07

## 2022-07-01 RX ADMIN — DEXMEDETOMIDINE HYDROCHLORIDE 0.3 MCG/KG/HR: 4 INJECTION, SOLUTION INTRAVENOUS at 06:07

## 2022-07-01 RX ADMIN — Medication 650 MG: at 01:07

## 2022-07-01 RX ADMIN — DEXTROSE: 10 SOLUTION INTRAVENOUS at 05:07

## 2022-07-01 RX ADMIN — SODIUM CHLORIDE, PRESERVATIVE FREE 10 ML: 5 INJECTION INTRAVENOUS at 06:07

## 2022-07-01 RX ADMIN — MORPHINE SULFATE 2 MG: 2 INJECTION, SOLUTION INTRAMUSCULAR; INTRAVENOUS at 05:07

## 2022-07-01 RX ADMIN — DEXTROSE: 10 SOLUTION INTRAVENOUS at 10:07

## 2022-07-01 RX ADMIN — FENOFIBRATE 160 MG: 160 TABLET ORAL at 08:07

## 2022-07-01 RX ADMIN — FUROSEMIDE 40 MG: 10 INJECTION, SOLUTION INTRAMUSCULAR; INTRAVENOUS at 03:07

## 2022-07-01 RX ADMIN — ONDANSETRON 4 MG: 2 INJECTION INTRAMUSCULAR; INTRAVENOUS at 06:07

## 2022-07-01 RX ADMIN — ENOXAPARIN SODIUM 40 MG: 40 INJECTION SUBCUTANEOUS at 04:07

## 2022-07-01 RX ADMIN — DEXMEDETOMIDINE HYDROCHLORIDE 0.3 MCG/KG/HR: 4 INJECTION, SOLUTION INTRAVENOUS at 07:07

## 2022-07-01 RX ADMIN — MORPHINE SULFATE 2 MG: 2 INJECTION, SOLUTION INTRAMUSCULAR; INTRAVENOUS at 01:07

## 2022-07-01 RX ADMIN — SODIUM CHLORIDE, PRESERVATIVE FREE 10 ML: 5 INJECTION INTRAVENOUS at 12:07

## 2022-07-01 RX ADMIN — INSULIN HUMAN 6 UNITS/HR: 1 INJECTION, SOLUTION INTRAVENOUS at 04:07

## 2022-07-01 RX ADMIN — HYDROMORPHONE HYDROCHLORIDE 1 MG: 1 INJECTION, SOLUTION INTRAMUSCULAR; INTRAVENOUS; SUBCUTANEOUS at 01:07

## 2022-07-01 RX ADMIN — THIAMINE HCL TAB 100 MG 100 MG: 100 TAB at 08:07

## 2022-07-01 RX ADMIN — DIAZEPAM 5 MG: 5 INJECTION, SOLUTION INTRAMUSCULAR; INTRAVENOUS at 08:07

## 2022-07-01 RX ADMIN — POLYETHYLENE GLYCOL 3350 17 G: 17 POWDER, FOR SOLUTION ORAL at 08:07

## 2022-07-01 RX ADMIN — MORPHINE SULFATE 2 MG: 2 INJECTION, SOLUTION INTRAMUSCULAR; INTRAVENOUS at 10:07

## 2022-07-01 RX ADMIN — Medication 650 MG: at 08:07

## 2022-07-01 RX ADMIN — DEXTROSE: 10 SOLUTION INTRAVENOUS at 06:07

## 2022-07-01 RX ADMIN — DIAZEPAM 5 MG: 5 INJECTION, SOLUTION INTRAMUSCULAR; INTRAVENOUS at 01:07

## 2022-07-01 RX ADMIN — THERA TABS 1 TABLET: TAB at 08:07

## 2022-07-01 RX ADMIN — FOLIC ACID 1 MG: 1 TABLET ORAL at 08:07

## 2022-07-01 NOTE — PLAN OF CARE
Aox4. Pain moderately controlled c prn IV Morphine 2 mg q4. Ambulates to toilet with standby assist. Bmx2 noted. VSS, SR and ST noted to monitor. PICC line dislodged by pt during sleep, NP and MD made aware. Awaiting triglyceride lab result. BG checked q1h. D10 infusion increased to maintain BG per order. PEC continued, safety measures in place. Hourly checks completed, sitter at bedside.

## 2022-07-01 NOTE — PROGRESS NOTES
[facility] Pulmonary/Critical Care Fellow Progress Note:    Primary Attending:  Nba Johnson MD   Consultant Attending: David Chopra MD   Consultant Fellow: Juan Whitten DO, HO-IV     Admit Date: 2022   Hospital LOS: 3     Brief Summary of Hospitalation:  37 M with class III obesity, Bipolar 1 disorder, EtOH abuse who presented to [facility] w/o co abdominal pain, nausea and vomiting x 2 days and was admitted with EtOH and triglyceride associated pancreatitis. Initial TG level 5680 requiring IV insulin drip and ICU admission. His course is also c/b withdrawal from EtOH for which he is currently receiving precedex.    Interval Hx:  Seen and examined this am. Abdominal pain is relieved with pain medication, continues to have nausea and headache, reports anxiety and some agitation-fidgeting but denies auditory or visual stimuli or formication. Reports no chest pain but has noted his breathing has started to become labored. Noted to be hypertensive and tachycardic this am in spite of precedex at 0.3mcg/kg/h.     Objective:  Last 24 Hour Vital Signs:  BP  Min: 99/79  Max: 160/99  Temp  Av.7 °F (37.1 °C)  Min: 97.6 °F (36.4 °C)  Max: 99.3 °F (37.4 °C)  Pulse  Av.1  Min: 81  Max: 173  Resp  Av.2  Min: 19  Max: 34  SpO2  Av.7 %  Min: 94 %  Max: 99 %  Body mass index is 45.25 kg/m².  I & O (Last 24H):  Intake/Output Summary (Last 24 hours) at 2022 1047  Last data filed at 2022 1003  Gross per 24 hour   Intake 5263.71 ml   Output 4975 ml   Net 288.71 ml       Physical Exam:  GEN: non-toxic appearing and appears stated age  HEENT: MMM, no scleral icterus, EOMI  NECK: Supple, midline trachea, no raised JVP or a-waves notable  CV: RRR, no MRG, pulses equal and symmetric 2+ at radial  PULM: CTAB, no W/R/R, on room air.  ABDOMEN: Grossly distended, mild TTP, no rebound  SKIN: Warm, dry, intact, no rashes  MSK: No deformity, no clubbing, cyanosis, or lower extremity edema  NEURO: RASS 0,  CAM -, awake and following commands, at neurologic baseline  LINES: Intact, no extravasation or induration, no erythema to PIV or support devices    I have reviewed all labs / images / cultures  Pertinent labs are as follows:   No results for input(s): PH, PCO2, PO2, HCO3, POCSATURATED, BE in the last 24 hours.  Recent Labs   Lab 07/01/22  0409   WBC 15.86*   RBC 3.08*   HGB 9.2*   HCT 27.7*      MCV 90   MCH 29.9   MCHC 33.2     Recent Labs   Lab 07/01/22  0409   *   K 3.7   CL 96   CO2 18*   BUN 4*   CREATININE 1.5*     Lab Results   Component Value Date    TRIG 5,059 (H) 06/30/2022    TRIG 1,697 (H) 06/29/2022    TRIG >5,680 (H) 06/28/2022     Meds:   enoxaparin  40 mg Subcutaneous Daily    fenofibrate  160 mg Oral Daily    folic acid  1 mg Oral Daily    multivitamin  1 tablet Oral Daily    polyethylene glycol  17 g Oral BID    sodium chloride 0.9%  10 mL Intravenous Q6H    thiamine  100 mg Oral Daily      dexmedetomidine (PRECEDEX) infusion 0.3 mcg/kg/hr (07/01/22 0626)    dextrose 10 % in water (D10W) 275 mL/hr at 07/01/22 0639    insulin regular 1 units/mL infusion orderable (DKA) 6 Units/hr (07/01/22 0200)       6/29/22 CTAP: Retroperitoneal edema felt to represent acute pancreatitis. Please correlate with laboratory values. No focal fluid collection. Hepatomegaly with hepatic steatosis.    Assessment & Plan: Lola Wetzel is a 37 y.o. M with class III obesity, EtOH abuse, and Bipolar 1 disorder who presented with pancreatitis in setting of hypertriglyceridemia and EtOH abuse. He is admitted to ICU for IV insulin and precedex drip.    Problem List:  1. Acute Pancreatitis  2. EtOH abuse with current withdrawal  3. Non-oliguric JOSE  4. Elevated blood pressure in a patient without hypertension  5. Suicidal Ideation  6. Bipolar 1 disorder  7. Leukocytosis  8. Lactic acidosis resolved  9. Hyponatremia    Plan by System:  NEURO:   At neurologic baseline    Morphine 2mg q 4h prn for pain  control    Would ideally like to limit precedex for volume and also as it masks his withdrawal symptoms.    CV:  Hypertensive and tachycardic despite precedex. See EtOH withdrawal for plan    PULM:  FREDDY. CXR looks like he may be developing interstitial edema. Will have to watch fluids.  Lasix spot dosing if he starts to require oxygen. If increasing O2 requirements and TG >5000 will likely have to have PLEX.     FEN/RENAL:  Hyponatremia:    118 on admission now improving slowly to 129. Possible beer potomania as it has rapidly improved with cessation of EtOH consumption while inpatient. Osm 271, no urine lytes available for review. Renal impairment could be contributing.   Mental status is at baseline.    Obtain U Na, U Osm, TSH    Continue to monitor closely.     Lactic Acidosis:  · Suspect type B lactic acidosis with delayed clearance in setting of liver dysfunction and renal impairment.     JOSE: UA is bland, 2+ proteinuria, 1+ blood, otherwise not reactive. Organ failure in setting of pancreatitis is likely. He is grossly distended on exam. Making great urine, responded to lasix yesterday, but renal indicis unfortunately slightly worse today.  · Check Bladder pressure  · Check CPK    GI/HEP:  Triglyceride and EtOH Induced Pancreatitis: Heavy EtOH abuse, chronic, recently worse. TG on admission >5600 with CTAP showing peripancreatic edema.   · Insulin initiated and TG downtrended to 1697, but has risen again. Awaiting repeat this am  · Continue insulin and D10% infusion to maintain euglycemia  · Watch volume status closely, if he develops hypoxemia, will need PLEX if TG remains elevated    ENDO:  D10% infusion to offset insulin    HEME/ID:  Leukocytosis: Likely stress related. No source of infection and white count has gone down.     PSYCH:  Bipolar Disorder 1 with Suicidal Ideation:   · PEC in place  · Appreciate psych    EtOH abuse with withdrawal: has had tachycardia and elevated systolics in past 24-48h,  also with HA, nausea, and anxiety.    · CIWA scale  · PRN BZD substitute for precedex.     This plan was discussed with staff pulmonologist Dr. Chopra    We will continue to follow with you, thank you for the opportunity to be involved in Mr./MsAmy Wetzel's care.    GRIS Greenberg  [facility] fellow

## 2022-07-01 NOTE — EICU
PICC line became partially dislodged during a dressing change. It's his only access. RN attempted to feed it back but is meeting resistance. She secured it.    Get a Chest X ray to see the position of PICC tip.

## 2022-07-01 NOTE — PROGRESS NOTES
[facility] Intensive Care [facility]  Gastroenterology  Progress Note    Patient Name: Lola Wetzel  MRN: [id number]  Admission Date: 6/28/2022  Hospital Length of Stay: 3 days  Code Status: Full Code   Attending Provider: Nba Johnson MD  Primary Care Physician: Janet Alaniz DO  Principal Problem: Acute pancreatitis    Subjective:     CC= acute pancreatitis    Interval History:  He denies abdominal pain at this time. He is tolerating liquids.  He wants to try solid food.  He passed a small loose stool.       Review of systems:  General: Negative for fevers, chills.  Cardiovascular:  Negative for chest pain, shortness of breath     Objective:     Vital Signs (Most Recent):  Temp: 97.6 °F (36.4 °C) (07/01/22 0701)  Pulse: (!) 173 (07/01/22 0801)  Resp: (!) 32 (07/01/22 0801)  BP: 128/75 (07/01/22 0801)  SpO2: 98 % (07/01/22 0801) Vital Signs (24h Range):  Temp:  [97.6 °F (36.4 °C)-99.3 °F (37.4 °C)] 97.6 °F (36.4 °C)  Pulse:  [] 173  Resp:  [19-34] 32  SpO2:  [92 %-98 %] 98 %  BP: ()/(56-99) 128/75     Physical examination:  GEN: Well developed, well nourished in no apparent distress   HENT: Normocephalic, anicteric sclera   Cardiovascular: tachy, regular. No murmurs appreciated.   Chest: Non-labored respirations. Breath sounds equal   Abdomen:  Protuberant, minimal epigastric tenderness, hypoactive BS, tympanic  Psych: Appropriate mood and affect.   Extermities: No C/C/E.   Skin: No new visible or palpable lesions.     CBC:   Recent Labs   Lab 06/30/22  0445 07/01/22  0409   WBC 18.53* 15.86*   HGB 10.6* 9.2*   HCT 31.3* 27.7*    213     CMP:   Recent Labs   Lab 07/01/22  0409   GLU 89   CALCIUM 7.9*   ALBUMIN 1.7*   PROT 7.4   *   K 3.7   CO2 18*   CL 96   BUN 4*   CREATININE 1.5*   ALKPHOS 249*   ALT 57*   *   BILITOT 1.5*         TGL: >5680 -->1697 --> 5000s      CT:  FINDINGS:  The lung bases show subsegmental atelectasis.  Right lower lobe pulmonary micro nodules  measure up to 6 mm.     The liver shows diffuse decreased parenchymal attenuation..  Gallbladder contains high attenuation material which may represent sludge.  Normal size spleen.     Retroperitoneal soft tissue edema largely situated along the pancreas.  Stranding extends into the pelvis.  No focal fluid collection.     Adrenal glands are normal.  Kidneys concentrate contrast appropriately.  The urinary bladder is unremarkable.     Aorta is normal caliber.  No retroperitoneal or mesenteric lymph node enlargement seen.     Stomach and loops of bowel are normal caliber.  Visualized appendix is normal caliber with a few tiny phleboliths.  No significant free fluid in the pelvis.     Regional skeleton is intact.     Impression:     Retroperitoneal edema felt to represent acute pancreatitis.  Please correlate with laboratory values.  No focal fluid collection.     Hepatomegaly with hepatic steatosis.      Assessment:   37 y.o. man with hx of bipolar disorder and alcohol abuse admitted with severe pancreatitis 2/2 hypertriglyceridemia and alcohol.  His symptoms are improving. CT unremarkable.    Plan:   -continue IV fluids and insulin drip  -monitor triglycerides  -if triglycerides do not continue to improve, plasmapheresis can be considered  -trend CBC, CMP  -advance diet as tolerated  -multivitamin, thiamine and folate  -at some point, the patient will need an evaluation by Endocrinology.        Darinel Peace MD  Gastroenterology  [facility] - Intensive Care ([place])

## 2022-07-02 LAB
ALBUMIN SERPL BCP-MCNC: 1.7 G/DL (ref 3.5–5.2)
ALP SERPL-CCNC: 234 U/L (ref 55–135)
ALT SERPL W/O P-5'-P-CCNC: 58 U/L (ref 10–44)
ANION GAP SERPL CALC-SCNC: 13 MMOL/L (ref 8–16)
ANISOCYTOSIS BLD QL SMEAR: SLIGHT
AST SERPL-CCNC: 103 U/L (ref 10–40)
BASOPHILS # BLD AUTO: ABNORMAL K/UL (ref 0–0.2)
BASOPHILS NFR BLD: 0 % (ref 0–1.9)
BILIRUB SERPL-MCNC: 1.2 MG/DL (ref 0.1–1)
BUN SERPL-MCNC: 3 MG/DL (ref 6–20)
CALCIUM SERPL-MCNC: 8.3 MG/DL (ref 8.7–10.5)
CHLORIDE SERPL-SCNC: 102 MMOL/L (ref 95–110)
CO2 SERPL-SCNC: 23 MMOL/L (ref 23–29)
CREAT SERPL-MCNC: 1.3 MG/DL (ref 0.5–1.4)
DIFFERENTIAL METHOD: ABNORMAL
EOSINOPHIL # BLD AUTO: ABNORMAL K/UL (ref 0–0.5)
EOSINOPHIL NFR BLD: 1 % (ref 0–8)
ERYTHROCYTE [DISTWIDTH] IN BLOOD BY AUTOMATED COUNT: 16.7 % (ref 11.5–14.5)
EST. GFR  (AFRICAN AMERICAN): >60 ML/MIN/1.73 M^2
EST. GFR  (NON AFRICAN AMERICAN): >60 ML/MIN/1.73 M^2
GLUCOSE SERPL-MCNC: 86 MG/DL (ref 70–110)
HCT VFR BLD AUTO: 29.5 % (ref 40–54)
HGB BLD-MCNC: 10.2 G/DL (ref 14–18)
IMM GRANULOCYTES # BLD AUTO: ABNORMAL K/UL (ref 0–0.04)
IMM GRANULOCYTES NFR BLD AUTO: ABNORMAL % (ref 0–0.5)
LYMPHOCYTES # BLD AUTO: ABNORMAL K/UL (ref 1–4.8)
LYMPHOCYTES NFR BLD: 17 % (ref 18–48)
MCH RBC QN AUTO: 30.6 PG (ref 27–31)
MCHC RBC AUTO-ENTMCNC: 34.6 G/DL (ref 32–36)
MCV RBC AUTO: 89 FL (ref 82–98)
MONOCYTES # BLD AUTO: ABNORMAL K/UL (ref 0.3–1)
MONOCYTES NFR BLD: 13 % (ref 4–15)
NEUTROPHILS NFR BLD: 66 % (ref 38–73)
NEUTS BAND NFR BLD MANUAL: 3 %
NRBC BLD-RTO: 2 /100 WBC
PLATELET # BLD AUTO: 210 K/UL (ref 150–450)
PLATELET BLD QL SMEAR: ABNORMAL
PMV BLD AUTO: 10.1 FL (ref 9.2–12.9)
POCT GLUCOSE: 102 MG/DL (ref 70–110)
POCT GLUCOSE: 102 MG/DL (ref 70–110)
POCT GLUCOSE: 108 MG/DL (ref 70–110)
POCT GLUCOSE: 111 MG/DL (ref 70–110)
POCT GLUCOSE: 117 MG/DL (ref 70–110)
POCT GLUCOSE: 118 MG/DL (ref 70–110)
POCT GLUCOSE: 60 MG/DL (ref 70–110)
POCT GLUCOSE: 83 MG/DL (ref 70–110)
POCT GLUCOSE: 84 MG/DL (ref 70–110)
POCT GLUCOSE: 85 MG/DL (ref 70–110)
POCT GLUCOSE: 90 MG/DL (ref 70–110)
POCT GLUCOSE: 92 MG/DL (ref 70–110)
POCT GLUCOSE: 93 MG/DL (ref 70–110)
POCT GLUCOSE: 95 MG/DL (ref 70–110)
POCT GLUCOSE: 95 MG/DL (ref 70–110)
POCT GLUCOSE: 96 MG/DL (ref 70–110)
POCT GLUCOSE: 96 MG/DL (ref 70–110)
POCT GLUCOSE: 99 MG/DL (ref 70–110)
POCT GLUCOSE: >500 MG/DL (ref 70–110)
POIKILOCYTOSIS BLD QL SMEAR: SLIGHT
POTASSIUM SERPL-SCNC: 3.2 MMOL/L (ref 3.5–5.1)
PROT SERPL-MCNC: 6.4 G/DL (ref 6–8.4)
RBC # BLD AUTO: 3.33 M/UL (ref 4.6–6.2)
SODIUM SERPL-SCNC: 138 MMOL/L (ref 136–145)
TARGETS BLD QL SMEAR: ABNORMAL
TRIGL SERPL-MCNC: 531 MG/DL (ref 30–150)
TRIGL SERPL-MCNC: 777 MG/DL (ref 30–150)
WBC # BLD AUTO: 10.46 K/UL (ref 3.9–12.7)

## 2022-07-02 PROCEDURE — 20000000 HC ICU ROOM

## 2022-07-02 PROCEDURE — 80053 COMPREHEN METABOLIC PANEL: CPT | Performed by: NURSE PRACTITIONER

## 2022-07-02 PROCEDURE — 99233 PR SUBSEQUENT HOSPITAL CARE,LEVL III: ICD-10-PCS | Mod: ,,, | Performed by: HOSPITALIST

## 2022-07-02 PROCEDURE — 25000003 PHARM REV CODE 250: Performed by: NURSE PRACTITIONER

## 2022-07-02 PROCEDURE — 25000003 PHARM REV CODE 250: Performed by: STUDENT IN AN ORGANIZED HEALTH CARE EDUCATION/TRAINING PROGRAM

## 2022-07-02 PROCEDURE — 85007 BL SMEAR W/DIFF WBC COUNT: CPT | Performed by: NURSE PRACTITIONER

## 2022-07-02 PROCEDURE — 25000003 PHARM REV CODE 250: Performed by: INTERNAL MEDICINE

## 2022-07-02 PROCEDURE — 84478 ASSAY OF TRIGLYCERIDES: CPT | Performed by: INTERNAL MEDICINE

## 2022-07-02 PROCEDURE — 99233 SBSQ HOSP IP/OBS HIGH 50: CPT | Mod: ,,, | Performed by: HOSPITALIST

## 2022-07-02 PROCEDURE — 25000003 PHARM REV CODE 250: Performed by: HOSPITALIST

## 2022-07-02 PROCEDURE — 85027 COMPLETE CBC AUTOMATED: CPT | Performed by: NURSE PRACTITIONER

## 2022-07-02 PROCEDURE — 63600175 PHARM REV CODE 636 W HCPCS: Performed by: NURSE PRACTITIONER

## 2022-07-02 PROCEDURE — 84478 ASSAY OF TRIGLYCERIDES: CPT | Mod: 91 | Performed by: STUDENT IN AN ORGANIZED HEALTH CARE EDUCATION/TRAINING PROGRAM

## 2022-07-02 PROCEDURE — 63600175 PHARM REV CODE 636 W HCPCS: Performed by: HOSPITALIST

## 2022-07-02 PROCEDURE — 94761 N-INVAS EAR/PLS OXIMETRY MLT: CPT

## 2022-07-02 PROCEDURE — 25000242 PHARM REV CODE 250 ALT 637 W/ HCPCS: Performed by: HOSPITALIST

## 2022-07-02 PROCEDURE — A4216 STERILE WATER/SALINE, 10 ML: HCPCS | Performed by: HOSPITALIST

## 2022-07-02 RX ORDER — POTASSIUM CHLORIDE 20 MEQ/1
40 TABLET, EXTENDED RELEASE ORAL EVERY 4 HOURS
Status: COMPLETED | OUTPATIENT
Start: 2022-07-02 | End: 2022-07-02

## 2022-07-02 RX ORDER — FUROSEMIDE 10 MG/ML
40 INJECTION INTRAMUSCULAR; INTRAVENOUS ONCE
Status: COMPLETED | OUTPATIENT
Start: 2022-07-02 | End: 2022-07-02

## 2022-07-02 RX ORDER — DIAZEPAM 10 MG/2ML
10 INJECTION INTRAMUSCULAR ONCE
Status: COMPLETED | OUTPATIENT
Start: 2022-07-02 | End: 2022-07-02

## 2022-07-02 RX ORDER — ATORVASTATIN CALCIUM 20 MG/1
80 TABLET, FILM COATED ORAL DAILY
Status: DISCONTINUED | OUTPATIENT
Start: 2022-07-02 | End: 2022-07-07 | Stop reason: HOSPADM

## 2022-07-02 RX ORDER — CALCIUM CARBONATE 200(500)MG
500 TABLET,CHEWABLE ORAL 3 TIMES DAILY PRN
Status: DISCONTINUED | OUTPATIENT
Start: 2022-07-02 | End: 2022-07-07 | Stop reason: HOSPADM

## 2022-07-02 RX ORDER — DIAZEPAM 10 MG/2ML
10 INJECTION INTRAMUSCULAR EVERY 4 HOURS PRN
Status: DISCONTINUED | OUTPATIENT
Start: 2022-07-02 | End: 2022-07-07 | Stop reason: HOSPADM

## 2022-07-02 RX ORDER — PANTOPRAZOLE SODIUM 40 MG/1
40 TABLET, DELAYED RELEASE ORAL DAILY
Status: DISCONTINUED | OUTPATIENT
Start: 2022-07-02 | End: 2022-07-07 | Stop reason: HOSPADM

## 2022-07-02 RX ADMIN — POTASSIUM CHLORIDE 40 MEQ: 1500 TABLET, EXTENDED RELEASE ORAL at 05:07

## 2022-07-02 RX ADMIN — CALCIUM CARBONATE (ANTACID) CHEW TAB 500 MG 500 MG: 500 CHEW TAB at 12:07

## 2022-07-02 RX ADMIN — PROMETHAZINE HYDROCHLORIDE 25 MG: 25 INJECTION INTRAMUSCULAR; INTRAVENOUS at 02:07

## 2022-07-02 RX ADMIN — THERA TABS 1 TABLET: TAB at 09:07

## 2022-07-02 RX ADMIN — FOLIC ACID 1 MG: 1 TABLET ORAL at 09:07

## 2022-07-02 RX ADMIN — ONDANSETRON 4 MG: 2 INJECTION INTRAMUSCULAR; INTRAVENOUS at 07:07

## 2022-07-02 RX ADMIN — MORPHINE SULFATE 2 MG: 2 INJECTION, SOLUTION INTRAMUSCULAR; INTRAVENOUS at 11:07

## 2022-07-02 RX ADMIN — FUROSEMIDE 40 MG: 10 INJECTION, SOLUTION INTRAMUSCULAR; INTRAVENOUS at 02:07

## 2022-07-02 RX ADMIN — INSULIN HUMAN 6 UNITS/HR: 1 INJECTION, SOLUTION INTRAVENOUS at 09:07

## 2022-07-02 RX ADMIN — DIAZEPAM 5 MG: 5 INJECTION, SOLUTION INTRAMUSCULAR; INTRAVENOUS at 03:07

## 2022-07-02 RX ADMIN — DIAZEPAM 10 MG: 5 INJECTION, SOLUTION INTRAMUSCULAR; INTRAVENOUS at 10:07

## 2022-07-02 RX ADMIN — MORPHINE SULFATE 2 MG: 2 INJECTION, SOLUTION INTRAMUSCULAR; INTRAVENOUS at 09:07

## 2022-07-02 RX ADMIN — MORPHINE SULFATE 2 MG: 2 INJECTION, SOLUTION INTRAMUSCULAR; INTRAVENOUS at 05:07

## 2022-07-02 RX ADMIN — FENOFIBRATE 160 MG: 160 TABLET ORAL at 09:07

## 2022-07-02 RX ADMIN — SODIUM CHLORIDE, PRESERVATIVE FREE 10 ML: 5 INJECTION INTRAVENOUS at 07:07

## 2022-07-02 RX ADMIN — DEXMEDETOMIDINE HYDROCHLORIDE 0.6 MCG/KG/HR: 4 INJECTION, SOLUTION INTRAVENOUS at 03:07

## 2022-07-02 RX ADMIN — ATORVASTATIN CALCIUM 80 MG: 20 TABLET, FILM COATED ORAL at 09:07

## 2022-07-02 RX ADMIN — THIAMINE HCL TAB 100 MG 100 MG: 100 TAB at 09:07

## 2022-07-02 RX ADMIN — DIAZEPAM 10 MG: 5 INJECTION, SOLUTION INTRAMUSCULAR; INTRAVENOUS at 06:07

## 2022-07-02 RX ADMIN — Medication 650 MG: at 10:07

## 2022-07-02 RX ADMIN — POTASSIUM CHLORIDE 40 MEQ: 1500 TABLET, EXTENDED RELEASE ORAL at 09:07

## 2022-07-02 RX ADMIN — DEXTROSE: 10 SOLUTION INTRAVENOUS at 09:07

## 2022-07-02 RX ADMIN — MORPHINE SULFATE 2 MG: 2 INJECTION, SOLUTION INTRAMUSCULAR; INTRAVENOUS at 12:07

## 2022-07-02 RX ADMIN — ONDANSETRON 4 MG: 2 INJECTION INTRAMUSCULAR; INTRAVENOUS at 11:07

## 2022-07-02 RX ADMIN — SODIUM CHLORIDE, PRESERVATIVE FREE 10 ML: 5 INJECTION INTRAVENOUS at 12:07

## 2022-07-02 RX ADMIN — PANTOPRAZOLE SODIUM 40 MG: 40 TABLET, DELAYED RELEASE ORAL at 05:07

## 2022-07-02 RX ADMIN — Medication 650 MG: at 05:07

## 2022-07-02 RX ADMIN — DEXTROSE: 10 SOLUTION INTRAVENOUS at 03:07

## 2022-07-02 RX ADMIN — ENOXAPARIN SODIUM 40 MG: 40 INJECTION SUBCUTANEOUS at 05:07

## 2022-07-02 RX ADMIN — ONDANSETRON 4 MG: 2 INJECTION INTRAMUSCULAR; INTRAVENOUS at 04:07

## 2022-07-02 RX ADMIN — DEXTROSE: 10 SOLUTION INTRAVENOUS at 12:07

## 2022-07-02 NOTE — SUBJECTIVE & OBJECTIVE
Interval History: Triglycerides improved then worsen again but rending down again.  Evidence of hypervolemia.    Review of Systems   Constitutional:  Negative for chills and fever.   Respiratory:  Negative for shortness of breath.    Cardiovascular:  Negative for chest pain.   Gastrointestinal:  Positive for abdominal distention. Negative for abdominal pain, constipation, diarrhea, nausea and vomiting.   Psychiatric/Behavioral:  Positive for agitation.      Objective:     Vital Signs (Most Recent):  Temp: 99 °F (37.2 °C) (07/01/22 1501)  Pulse: 99 (07/01/22 1801)  Resp: (!) 31 (07/01/22 1801)  BP: (!) 145/72 (07/01/22 1801)  SpO2: 95 % (07/01/22 1801)   Vital Signs (24h Range):  Temp:  [97.6 °F (36.4 °C)-99.3 °F (37.4 °C)] 99 °F (37.2 °C)  Pulse:  [] 99  Resp:  [16-39] 31  SpO2:  [94 %-99 %] 95 %  BP: (113-151)/(65-98) 145/72     Weight: (!) 164.2 kg (361 lb 15.9 oz)  Body mass index is 45.25 kg/m².    Intake/Output Summary (Last 24 hours) at 7/1/2022 2021  Last data filed at 7/1/2022 1740  Gross per 24 hour   Intake 1660.6 ml   Output 3600 ml   Net -1939.4 ml        Physical Exam  Constitutional:       Appearance: He is obese. He is ill-appearing.   Cardiovascular:      Rate and Rhythm: Normal rate and regular rhythm.      Heart sounds: Normal heart sounds. No murmur heard.    No friction rub. No gallop.   Pulmonary:      Effort: Pulmonary effort is normal. No respiratory distress.      Breath sounds: Normal breath sounds. No wheezing or rales.   Abdominal:      General: Bowel sounds are decreased. There is distension.      Palpations: Abdomen is soft.   Musculoskeletal:         General: No tenderness. Normal range of motion.   Skin:     General: Skin is warm and dry.      Coloration: Skin is not pale.      Findings: No erythema or rash.   Neurological:      Mental Status: He is alert and oriented to person, place, and time.       Significant Labs: All pertinent labs within the past 24 hours have been  reviewed.    Significant Imaging: I have reviewed all pertinent imaging results/findings within the past 24 hours.

## 2022-07-02 NOTE — PROGRESS NOTES
[facility] Pulmonary/Critical Care Fellow Progress Note:    Primary Attending:  Nba Johnson MD   Consultant Attending: David Chopra MD   Consultant Fellow: Bozena Valdes MD     Admit Date: 2022   Hospital LOS: 4     Brief Summary of Hospitalation:  37 M with class III obesity, Bipolar 1 disorder, EtOH abuse who presented to [facility] w/o co abdominal pain, nausea and vomiting x 2 days and was admitted with EtOH and triglyceride associated pancreatitis. Initial TG level 5680 requiring IV insulin drip and ICU admission. His course is also c/b withdrawal from EtOH for which he is currently receiving precedex.    Interval Hx:  Seen and examined this am. States that abdominal pain has resolved. Feels well overall, a little tired of being int he ICU and wants to be able to walk around more. Off precedex, awake and alert, no agitated or combative    Objective:  Last 24 Hour Vital Signs:  BP  Min: 111/74  Max: 151/69  Temp  Av.6 °F (37 °C)  Min: 98 °F (36.7 °C)  Max: 99 °F (37.2 °C)  Pulse  Av.8  Min: 74  Max: 101  Resp  Av.5  Min: 13  Max: 39  SpO2  Av.7 %  Min: 95 %  Max: 99 %  Body mass index is 45.25 kg/m².  I & O (Last 24H):    Intake/Output Summary (Last 24 hours) at 2022 0915  Last data filed at 2022 1740  Gross per 24 hour   Intake --   Output 1800 ml   Net -1800 ml       Physical Exam:  GEN: non-toxic appearing and appears stated age  HEENT: MMM, no scleral icterus, EOMI  NECK: Supple, midline trachea, no raised JVP or a-waves notable  CV: RRR, no MRG, pulses equal and symmetric 2+ at radial  PULM: CTAB, no W/R/R, on room air.  ABDOMEN: Grossly distended, mild TTP, no rebound  SKIN: Warm, dry, intact, no rashes  MSK: No deformity, no clubbing, cyanosis, 2+ edema to BL knees   NEURO: RASS 0, CAM -, awake and following commands, at neurologic baseline  LINES: Intact, no extravasation or induration, no erythema to PIV or support devices    I have reviewed all labs / images /  cultures  Pertinent labs are as follows:   No results for input(s): PH, PCO2, PO2, HCO3, POCSATURATED, BE in the last 24 hours.  Recent Labs   Lab 07/02/22 0424   WBC 10.46   RBC 3.33*   HGB 10.2*   HCT 29.5*      MCV 89   MCH 30.6   MCHC 34.6     Recent Labs   Lab 07/02/22  0424      K 3.2*      CO2 23   BUN 3*   CREATININE 1.3     Lab Results   Component Value Date    TRIG 777 (H) 07/02/2022    TRIG 3,473 (H) 07/01/2022    TRIG >5,680 (H) 06/30/2022     Meds:   atorvastatin  80 mg Oral Daily    enoxaparin  40 mg Subcutaneous Daily    fenofibrate  160 mg Oral Daily    folic acid  1 mg Oral Daily    furosemide (LASIX) injection  40 mg Intravenous Once    multivitamin  1 tablet Oral Daily    polyethylene glycol  17 g Oral BID    potassium chloride  40 mEq Oral Q4H    sodium chloride 0.9%  10 mL Intravenous Q6H    thiamine  100 mg Oral Daily      dextrose 10 % in water (D10W) 150 mL/hr at 07/02/22 0042    insulin regular 1 units/mL infusion orderable (DKA) 6 Units/hr (07/01/22 1619)       6/29/22 CTAP: Retroperitoneal edema felt to represent acute pancreatitis. Please correlate with laboratory values. No focal fluid collection. Hepatomegaly with hepatic steatosis.    Assessment & Plan: Lola Wetzel is a 37 y.o. M with class III obesity, EtOH abuse, and Bipolar 1 disorder who presented with pancreatitis in setting of hypertriglyceridemia and EtOH abuse. He is admitted to ICU for IV insulin and precedex drip.    Problem List:  1. Acute Pancreatitis  2. EtOH abuse with current withdrawal  3. Non-oliguric JOSE  4. Elevated blood pressure in a patient without hypertension  5. Suicidal Ideation  6. Bipolar 1 disorder  7. Leukocytosis  8. Lactic acidosis resolved  9. Hyponatremia    Plan by System:  NEURO:   At neurologic baseline    Morphine 2mg q 4h prn for pain control    Now off precedex, dc order    CV:  Stable    PULM:  FREDDY. Continue lasix for, increase dose if develops O2  requirement    FEN/RENAL:  Hyponatremia:    118 on admission now normalized slowly to 138. Possible beer potomania as it has rapidly improved with cessation of EtOH consumption while inpatient. Osm 271, no urine lytes available for review. Renal impairment could be contributing.   Mental status is at baseline.    Continue to monitor closely.     Lactic Acidosis:  · Suspect type B lactic acidosis with delayed clearance in setting of liver dysfunction and renal impairment.     JOSE: UA is bland, 2+ proteinuria, 1+ blood, otherwise not reactive. Organ failure in setting of pancreatitis is likely. He is grossly distended on exam. Making great urine, responded to lasix yesterday, but renal indicis unfortunately slightly worse today.  · CPK very modestly elevated at 377    GI/HEP:  Triglyceride and EtOH Induced Pancreatitis: Heavy EtOH abuse, chronic, recently worse. TG on admission >5600 with CTAP showing peripancreatic edema.   · Insulin initiated and TG downtrended to 1697, but then neha again.   · Downtrended to 777 this AM, will recheck this PM can stop insulin gtt once below 500  · Decreased D10 gtt as pt has not been hypoglycemic. Continue insulin gtt until TG < 500  · On fenofibrate, add statin      ENDO:  D10% infusion to offset insulin    HEME/ID:  Leukocytosis: Likely stress related. No source of infection and white count has gone down.     PSYCH:  Bipolar Disorder 1 with Suicidal Ideation:   · PEC in place  · Appreciate psych    EtOH abuse with withdrawal: has had tachycardia and elevated systolics in past 24-48h, also with HA, nausea, and anxiety.    · CIWA scale  · PRN BZD substitute for precedex.     This plan was discussed with staff pulmonologist Dr. Chopra    We will continue to follow with you, thank you for the opportunity to be involved in ./Ms. Wetzel's care.    Bozena Valdes MD  Pulmonary and Critical Care Fellow  07/02/2022  11:08 AM

## 2022-07-02 NOTE — ASSESSMENT & PLAN NOTE
Severe acute pancreatitis secondary to alcohol abuse and severe hypertriglyceridemia.  Ultrasound negative for biliary obstruction.  Serial triglycerides trending down and then up again.  Continue continuous insulin infusion. Continue fenofibrate.  Evidence of hypervolemia.  Treated with diuretic with good urine output.  Discussed with pulmonary critical care and pathology.  Will hold off on plasmapheresis for now and give insulin infusion and fenofibrate more time to control hypertriglyceridemia with goal of lowering triglycerides less than <500 mg/dL. Started on low fat diet and tolerating some oral intake.  Continue antiemetics and pain medication as needed.

## 2022-07-02 NOTE — PROGRESS NOTES
Chief Complaint / Reason for Consult: acute pancreatitis      ROS: Feeling better overall. He is tolerating low fat cardiac diet. No n/v/abd pain. He has  Had several small loose, non bloody bms. UOP has been good  Patient Vitals for the past 24 hrs:   BP Temp Temp src Pulse Resp SpO2   07/02/22 1001 126/75 -- -- 94 20 98 %   07/02/22 0901 (!) 143/76 -- -- 85 20 (!) 88 %   07/02/22 0801 (!) 143/76 -- -- 85 (!) 34 98 %   07/02/22 0701 111/74 98.9 °F (37.2 °C) -- 88 18 99 %   07/02/22 0600 111/74 -- -- 74 (!) 22 98 %   07/02/22 0430 121/67 98 °F (36.7 °C) Oral 76 13 97 %   07/02/22 0206 129/72 -- -- 84 (!) 23 96 %   07/02/22 0040 -- -- -- -- (!) 23 --   07/02/22 0000 (!) 151/69 98.1 °F (36.7 °C) -- 85 14 95 %   07/01/22 2110 125/68 -- -- 94 (!) 21 97 %   07/01/22 2007 (!) 140/71 -- -- 98 19 95 %   07/01/22 2000 -- 98.5 °F (36.9 °C) -- -- -- --   07/01/22 1801 (!) 145/72 -- -- 99 (!) 31 95 %   07/01/22 1800 138/65 -- -- 101 (!) 26 96 %   07/01/22 1740 -- -- -- -- 20 --   07/01/22 1701 136/74 -- -- 100 (!) 24 98 %   07/01/22 1601 (!) 149/98 -- -- 91 (!) 26 99 %   07/01/22 1501 (!) 151/91 99 °F (37.2 °C) -- 89 16 95 %   07/01/22 1401 (!) 141/87 -- -- 96 20 95 %   07/01/22 1312 -- -- -- -- (!) 22 --   07/01/22 1301 (!) 140/95 -- -- 92 20 97 %   07/01/22 1201 (!) 145/98 -- -- 94 (!) 39 97 %   07/01/22 1101 130/80 98.8 °F (37.1 °C) -- 99 18 97 %       Physical Exam:  Gen - Well developed, well nourished, no apparent distress  HEENT - Anicteric, MMM  Neck- supple  CV - S1, S2, no murmurs/rubs  Lungs - CTA-B, normal excursion  Abd - distended, taut, NT normal BS's,unable to appreciate HSM.  Ext - No c/c/e  Neuro - No asterixis    Labs:  Recent Labs   Lab 07/02/22  0424   WBC 10.46   RBC 3.33*   HGB 10.2*   HCT 29.5*      MCV 89   MCH 30.6   MCHC 34.6     Recent Labs   Lab 07/02/22  0424   CALCIUM 8.3*   PROT 6.4      K 3.2*   CO2 23      BUN 3*   CREATININE 1.3   ALKPHOS 234*   ALT 58*   *   BILITOT  1.2*     No results for input(s): PT, INR, APTT in the last 24 hours.    Imaging:  No new    Assessment:37 y.o. man with hx of bipolar disorder and alcohol abuse admitted with severe pancreatitis 2/2 hypertriglyceridemia and alcohol.  His symptoms are improving. CT unremarkable.TGs trending down       Recommendations:  -continue IV fluids and insulin drip  -monitor triglycerides- may wean off insulin when TGs <500  -if triglycerides do not continue to improve, plasmapheresis can be considered  -trend CBC, CMP  - continue low fat diet  -multivitamin, thiamine and folate  -at some point, the patient will need an evaluation by Endocrinology.

## 2022-07-02 NOTE — ASSESSMENT & PLAN NOTE
Secondary to severe inflammatory response from acute pancreatitis, alcohol abuse/withdrawal, and possibly underlying mental illness.  Agitation improved.  Continue to wean dexmedetomidine infusion.  As needed benzodiazepines for withdrawal symptoms.  Psychiatry evaluated patient recommended physician emergency certificate (PEC).  Continue PEC for now.

## 2022-07-02 NOTE — PLAN OF CARE
AOx4. SR noted to monitor. VSS, afebrile. C/o abdominal pain x1 this shift.PRN IV Morphine 2 mg given.Pain moderately controlled c IV Morphine. No reports of nausea. C/o HA, moderate relief c PRN oral Tylenol. D10 and insulin infusing per orders. PEC continued, sitter at bedside, safety measures continued remains free from falls.

## 2022-07-03 PROBLEM — E87.20 LACTIC ACIDOSIS: Status: RESOLVED | Noted: 2022-06-28 | Resolved: 2022-07-03

## 2022-07-03 LAB
ALBUMIN SERPL BCP-MCNC: 1.8 G/DL (ref 3.5–5.2)
ALP SERPL-CCNC: 213 U/L (ref 55–135)
ALT SERPL W/O P-5'-P-CCNC: 68 U/L (ref 10–44)
ANION GAP SERPL CALC-SCNC: 13 MMOL/L (ref 8–16)
ANISOCYTOSIS BLD QL SMEAR: SLIGHT
AST SERPL-CCNC: 128 U/L (ref 10–40)
BACTERIA BLD CULT: NORMAL
BACTERIA BLD CULT: NORMAL
BASOPHILS # BLD AUTO: ABNORMAL K/UL (ref 0–0.2)
BASOPHILS NFR BLD: 0 % (ref 0–1.9)
BILIRUB SERPL-MCNC: 1.1 MG/DL (ref 0.1–1)
BUN SERPL-MCNC: 4 MG/DL (ref 6–20)
CALCIUM SERPL-MCNC: 8.3 MG/DL (ref 8.7–10.5)
CHLORIDE SERPL-SCNC: 103 MMOL/L (ref 95–110)
CO2 SERPL-SCNC: 24 MMOL/L (ref 23–29)
CREAT SERPL-MCNC: 1.3 MG/DL (ref 0.5–1.4)
DIFFERENTIAL METHOD: ABNORMAL
EOSINOPHIL # BLD AUTO: ABNORMAL K/UL (ref 0–0.5)
EOSINOPHIL NFR BLD: 3 % (ref 0–8)
ERYTHROCYTE [DISTWIDTH] IN BLOOD BY AUTOMATED COUNT: 17.2 % (ref 11.5–14.5)
EST. GFR  (AFRICAN AMERICAN): >60 ML/MIN/1.73 M^2
EST. GFR  (NON AFRICAN AMERICAN): >60 ML/MIN/1.73 M^2
GIANT PLATELETS BLD QL SMEAR: PRESENT
GLUCOSE SERPL-MCNC: 86 MG/DL (ref 70–110)
HCT VFR BLD AUTO: 29.5 % (ref 40–54)
HGB BLD-MCNC: 10 G/DL (ref 14–18)
HYPOCHROMIA BLD QL SMEAR: ABNORMAL
IMM GRANULOCYTES # BLD AUTO: ABNORMAL K/UL (ref 0–0.04)
IMM GRANULOCYTES NFR BLD AUTO: ABNORMAL % (ref 0–0.5)
LYMPHOCYTES # BLD AUTO: ABNORMAL K/UL (ref 1–4.8)
LYMPHOCYTES NFR BLD: 16 % (ref 18–48)
MCH RBC QN AUTO: 30 PG (ref 27–31)
MCHC RBC AUTO-ENTMCNC: 33.9 G/DL (ref 32–36)
MCV RBC AUTO: 89 FL (ref 82–98)
METAMYELOCYTES NFR BLD MANUAL: 1 %
MONOCYTES # BLD AUTO: ABNORMAL K/UL (ref 0.3–1)
MONOCYTES NFR BLD: 9 % (ref 4–15)
MYELOCYTES NFR BLD MANUAL: 2 %
NEUTROPHILS NFR BLD: 60 % (ref 38–73)
NEUTS BAND NFR BLD MANUAL: 9 %
NRBC BLD-RTO: 2 /100 WBC
PLATELET # BLD AUTO: 235 K/UL (ref 150–450)
PLATELET BLD QL SMEAR: ABNORMAL
PMV BLD AUTO: 10.2 FL (ref 9.2–12.9)
POCT GLUCOSE: 71 MG/DL (ref 70–110)
POCT GLUCOSE: 82 MG/DL (ref 70–110)
POCT GLUCOSE: 84 MG/DL (ref 70–110)
POCT GLUCOSE: 84 MG/DL (ref 70–110)
POCT GLUCOSE: 85 MG/DL (ref 70–110)
POCT GLUCOSE: 85 MG/DL (ref 70–110)
POCT GLUCOSE: 86 MG/DL (ref 70–110)
POCT GLUCOSE: 97 MG/DL (ref 70–110)
POIKILOCYTOSIS BLD QL SMEAR: SLIGHT
POLYCHROMASIA BLD QL SMEAR: ABNORMAL
POTASSIUM SERPL-SCNC: 3.2 MMOL/L (ref 3.5–5.1)
PROT SERPL-MCNC: 6.2 G/DL (ref 6–8.4)
RBC # BLD AUTO: 3.33 M/UL (ref 4.6–6.2)
SMUDGE CELLS BLD QL SMEAR: PRESENT
SODIUM SERPL-SCNC: 140 MMOL/L (ref 136–145)
TARGETS BLD QL SMEAR: ABNORMAL
TOXIC GRANULES BLD QL SMEAR: PRESENT
TRIGL SERPL-MCNC: 473 MG/DL (ref 30–150)
WBC # BLD AUTO: 10.11 K/UL (ref 3.9–12.7)

## 2022-07-03 PROCEDURE — A4216 STERILE WATER/SALINE, 10 ML: HCPCS | Performed by: HOSPITALIST

## 2022-07-03 PROCEDURE — 85007 BL SMEAR W/DIFF WBC COUNT: CPT | Performed by: STUDENT IN AN ORGANIZED HEALTH CARE EDUCATION/TRAINING PROGRAM

## 2022-07-03 PROCEDURE — 25000003 PHARM REV CODE 250: Performed by: HOSPITALIST

## 2022-07-03 PROCEDURE — 25000003 PHARM REV CODE 250: Performed by: STUDENT IN AN ORGANIZED HEALTH CARE EDUCATION/TRAINING PROGRAM

## 2022-07-03 PROCEDURE — 25000003 PHARM REV CODE 250: Performed by: INTERNAL MEDICINE

## 2022-07-03 PROCEDURE — 63600175 PHARM REV CODE 636 W HCPCS: Performed by: NURSE PRACTITIONER

## 2022-07-03 PROCEDURE — 84478 ASSAY OF TRIGLYCERIDES: CPT | Performed by: INTERNAL MEDICINE

## 2022-07-03 PROCEDURE — 25000003 PHARM REV CODE 250: Performed by: NURSE PRACTITIONER

## 2022-07-03 PROCEDURE — 99233 SBSQ HOSP IP/OBS HIGH 50: CPT | Mod: ,,, | Performed by: HOSPITALIST

## 2022-07-03 PROCEDURE — 99233 PR SUBSEQUENT HOSPITAL CARE,LEVL III: ICD-10-PCS | Mod: ,,, | Performed by: HOSPITALIST

## 2022-07-03 PROCEDURE — 63600175 PHARM REV CODE 636 W HCPCS: Performed by: HOSPITALIST

## 2022-07-03 PROCEDURE — 25000242 PHARM REV CODE 250 ALT 637 W/ HCPCS: Performed by: HOSPITALIST

## 2022-07-03 PROCEDURE — 85027 COMPLETE CBC AUTOMATED: CPT | Performed by: STUDENT IN AN ORGANIZED HEALTH CARE EDUCATION/TRAINING PROGRAM

## 2022-07-03 PROCEDURE — 20000000 HC ICU ROOM

## 2022-07-03 PROCEDURE — 80053 COMPREHEN METABOLIC PANEL: CPT | Performed by: STUDENT IN AN ORGANIZED HEALTH CARE EDUCATION/TRAINING PROGRAM

## 2022-07-03 RX ORDER — POTASSIUM CHLORIDE 20 MEQ/1
40 TABLET, EXTENDED RELEASE ORAL EVERY 4 HOURS
Status: COMPLETED | OUTPATIENT
Start: 2022-07-03 | End: 2022-07-03

## 2022-07-03 RX ORDER — ZOLPIDEM TARTRATE 5 MG/1
10 TABLET ORAL NIGHTLY
Status: COMPLETED | OUTPATIENT
Start: 2022-07-03 | End: 2022-07-03

## 2022-07-03 RX ORDER — HYDROMORPHONE HYDROCHLORIDE 1 MG/ML
1 INJECTION, SOLUTION INTRAMUSCULAR; INTRAVENOUS; SUBCUTANEOUS ONCE
Status: COMPLETED | OUTPATIENT
Start: 2022-07-03 | End: 2022-07-03

## 2022-07-03 RX ADMIN — DEXTROSE: 10 SOLUTION INTRAVENOUS at 05:07

## 2022-07-03 RX ADMIN — ATORVASTATIN CALCIUM 80 MG: 20 TABLET, FILM COATED ORAL at 10:07

## 2022-07-03 RX ADMIN — ENOXAPARIN SODIUM 40 MG: 40 INJECTION SUBCUTANEOUS at 04:07

## 2022-07-03 RX ADMIN — HYDROMORPHONE HYDROCHLORIDE 1 MG: 1 INJECTION, SOLUTION INTRAMUSCULAR; INTRAVENOUS; SUBCUTANEOUS at 03:07

## 2022-07-03 RX ADMIN — MORPHINE SULFATE 2 MG: 2 INJECTION, SOLUTION INTRAMUSCULAR; INTRAVENOUS at 01:07

## 2022-07-03 RX ADMIN — QUETIAPINE FUMARATE 300 MG: 200 TABLET ORAL at 09:07

## 2022-07-03 RX ADMIN — FOLIC ACID 1 MG: 1 TABLET ORAL at 10:07

## 2022-07-03 RX ADMIN — PANTOPRAZOLE SODIUM 40 MG: 40 TABLET, DELAYED RELEASE ORAL at 10:07

## 2022-07-03 RX ADMIN — HYDROMORPHONE HYDROCHLORIDE 1 MG: 1 INJECTION, SOLUTION INTRAMUSCULAR; INTRAVENOUS; SUBCUTANEOUS at 10:07

## 2022-07-03 RX ADMIN — INSULIN HUMAN 6 UNITS/HR: 1 INJECTION, SOLUTION INTRAVENOUS at 03:07

## 2022-07-03 RX ADMIN — POTASSIUM CHLORIDE 40 MEQ: 1500 TABLET, EXTENDED RELEASE ORAL at 10:07

## 2022-07-03 RX ADMIN — SODIUM CHLORIDE, PRESERVATIVE FREE 10 ML: 5 INJECTION INTRAVENOUS at 11:07

## 2022-07-03 RX ADMIN — FENOFIBRATE 160 MG: 160 TABLET ORAL at 10:07

## 2022-07-03 RX ADMIN — ONDANSETRON 4 MG: 2 INJECTION INTRAMUSCULAR; INTRAVENOUS at 09:07

## 2022-07-03 RX ADMIN — MORPHINE SULFATE 2 MG: 2 INJECTION, SOLUTION INTRAMUSCULAR; INTRAVENOUS at 08:07

## 2022-07-03 RX ADMIN — DIAZEPAM 10 MG: 5 INJECTION, SOLUTION INTRAMUSCULAR; INTRAVENOUS at 01:07

## 2022-07-03 RX ADMIN — QUETIAPINE FUMARATE 300 MG: 200 TABLET ORAL at 12:07

## 2022-07-03 RX ADMIN — ZOLPIDEM TARTRATE 10 MG: 5 TABLET ORAL at 09:07

## 2022-07-03 RX ADMIN — ONDANSETRON 4 MG: 2 INJECTION INTRAMUSCULAR; INTRAVENOUS at 10:07

## 2022-07-03 RX ADMIN — SODIUM CHLORIDE, PRESERVATIVE FREE 10 ML: 5 INJECTION INTRAVENOUS at 06:07

## 2022-07-03 RX ADMIN — MORPHINE SULFATE 2 MG: 2 INJECTION, SOLUTION INTRAMUSCULAR; INTRAVENOUS at 07:07

## 2022-07-03 RX ADMIN — PROMETHAZINE HYDROCHLORIDE 25 MG: 25 INJECTION INTRAMUSCULAR; INTRAVENOUS at 07:07

## 2022-07-03 RX ADMIN — THERA TABS 1 TABLET: TAB at 10:07

## 2022-07-03 RX ADMIN — SODIUM CHLORIDE, PRESERVATIVE FREE 10 ML: 5 INJECTION INTRAVENOUS at 12:07

## 2022-07-03 RX ADMIN — THIAMINE HCL TAB 100 MG 100 MG: 100 TAB at 10:07

## 2022-07-03 RX ADMIN — MORPHINE SULFATE 2 MG: 2 INJECTION, SOLUTION INTRAMUSCULAR; INTRAVENOUS at 04:07

## 2022-07-03 RX ADMIN — POTASSIUM CHLORIDE 40 MEQ: 1500 TABLET, EXTENDED RELEASE ORAL at 07:07

## 2022-07-03 RX ADMIN — DIAZEPAM 10 MG: 5 INJECTION, SOLUTION INTRAMUSCULAR; INTRAVENOUS at 07:07

## 2022-07-03 NOTE — PLAN OF CARE
Aox4. ST noted to monitor overnight. C/o abdominal pain, prn IV Morphine 2 mg given per orders. Dry heaving noted at beginning of this shift, 1x additional dose of zofran 4 mg IV given per NP. PRN IV valium given for CIWA of 8 c moderate relief. D10 and insulin continued. Hourly accuchecks completed. CEC continued. Sitter at bedside this shift. No reports of HI or SI. Safety measures continued: call light within reach. Remains free from falls. No acute distress noted.

## 2022-07-03 NOTE — ASSESSMENT & PLAN NOTE
Severe acute pancreatitis secondary to alcohol abuse and severe hypertriglyceridemia.  Ultrasound negative for biliary obstruction.  Triglycerides trending down.  Continue continuous insulin infusion. Continue fenofibrate.  Started on statin therapy.  Discussed with pulmonary critical care and pathology.  Will continue to off on plasmapheresis for now and give insulin infusion and fenofibrate more time to control hypertriglyceridemia with goal of lowering triglycerides less than <500 mg/dL. Continue low fat diet and tolerating some oral intake.  Continue antiemetics and pain medication as needed.

## 2022-07-03 NOTE — PROGRESS NOTES
"[facility] Intensive Care [facility] Medicine  Progress Note    Patient Name: Lola Wetzel  MRN: [id number]  Patient Class: IP- Inpatient   Admission Date: 6/28/2022  Length of Stay: 4 days  Attending Physician: Nba Johnson MD  Primary Care Provider: Janet Alaniz DO        Subjective:     Principal Problem:Acute pancreatitis        HPI:  Per Ileana Uribe, DNP:    "Mr Lola is a 37 yr old male with PMH that includes insomnia and bipolar 1 disorder. He came to the ED with abdominal pain, nausea, vomiting, and reported alcohol withdrawals. Pt also reports gaining 50 lbs over the past 6 months. He has been having severe insomnia for the past 30 days due to multiple stressors in his life. He was taking seroquel but it has not been working. He drinks immeasurable amounts of non-specific alcoholic beverages daily. He has not been able to keep anything down for the past two days. Pain is located in his left upper quadrant and is worse with palpation. The pain is described as cramping and started two days ago. Pt has had decreased intake and output over the past few days. He reports episodes of chills and sweats at home. Pt has allergy to ativan but is able to take diazepam. He has been admitted to hospital medicine and placed in the ICU."      Overview/Hospital Course:  Patient is a 37-year-old man with evidence of severe acute pancreatitis secondary to severe triglyceridemia and alcohol abuse. Patient presented with fever, leukocytosis, and severe lactic acidosis.  Severe inflammatory response likely due to acute pancreatitis without convincing evidence of a source of infection at this time.  Patient treated aggressive isotonic intravenous fluid resuscitation.  Lactic acidosis improving with volume resuscitation.       Patient with significant agitation overnight likely related to confusion related to metabolic encephalopathy and also alcohol withdrawal.  Patient started continuous " dexmedetomidine with improvement in mental status/agitation. Psychiatry service consulted and recommended PEC due to grave debility related to substance abuse and concern for underlying mental illness.       Patient also started on continuous infusion to treat severe hypertriglyceridemia.   Patient abdominal pain improving and now tolerating low fat diet.      Interval History: Triglycerides improving.    Review of Systems   Constitutional:  Negative for chills and fever.   Respiratory:  Negative for shortness of breath.    Cardiovascular:  Negative for chest pain.   Gastrointestinal:  Positive for abdominal distention. Negative for abdominal pain, constipation, diarrhea, nausea and vomiting.   Psychiatric/Behavioral:  Negative for agitation.      Objective:     Vital Signs (Most Recent):  Temp: 99.3 °F (37.4 °C) (07/02/22 1923)  Pulse: (!) 128 (07/02/22 2000)  Resp: (!) 26 (07/02/22 2000)  BP: (!) 157/82 (07/02/22 2000)  SpO2: 97 % (07/02/22 2000)   Vital Signs (24h Range):  Temp:  [98 °F (36.7 °C)-99.3 °F (37.4 °C)] 99.3 °F (37.4 °C)  Pulse:  [] 128  Resp:  [13-34] 26  SpO2:  [93 %-99 %] 97 %  BP: (106-157)/(65-90) 157/82     Weight: (!) 164.2 kg (361 lb 15.9 oz)  Body mass index is 45.25 kg/m².    Intake/Output Summary (Last 24 hours) at 7/2/2022 2052  Last data filed at 7/2/2022 2000  Gross per 24 hour   Intake 1551 ml   Output 3750 ml   Net -2199 ml        Physical Exam  Constitutional:       Appearance: He is obese. He is not ill-appearing.   Cardiovascular:      Rate and Rhythm: Normal rate and regular rhythm.      Heart sounds: Normal heart sounds. No murmur heard.    No friction rub. No gallop.   Pulmonary:      Effort: Pulmonary effort is normal. No respiratory distress.      Breath sounds: Normal breath sounds. No wheezing or rales.   Abdominal:      General: Bowel sounds are decreased. There is distension.      Palpations: Abdomen is soft.   Musculoskeletal:         General: No tenderness. Normal  range of motion.   Skin:     General: Skin is warm and dry.      Coloration: Skin is not pale.      Findings: No erythema or rash.   Neurological:      Mental Status: He is alert and oriented to person, place, and time.       Significant Labs: All pertinent labs within the past 24 hours have been reviewed.    Significant Imaging: I have reviewed all pertinent imaging results/findings within the past 24 hours.      Assessment/Plan:      * Acute pancreatitis  Severe acute pancreatitis secondary to alcohol abuse and severe hypertriglyceridemia.  Ultrasound negative for biliary obstruction.  Triglycerides trending down.  Continue continuous insulin infusion. Continue fenofibrate.  Started on statin therapy.  Discussed with pulmonary critical care and pathology.  Will continue to off on plasmapheresis for now and give insulin infusion and fenofibrate more time to control hypertriglyceridemia with goal of lowering triglycerides less than <500 mg/dL. Continue low fat diet and tolerating some oral intake.  Continue antiemetics and pain medication as needed.    Acute metabolic encephalopathy  Secondary to severe inflammatory response from acute pancreatitis, alcohol abuse/withdrawal, and possibly underlying mental illness.  Agitation improved.  Continue to wean dexmedetomidine infusion.  As needed benzodiazepines for withdrawal symptoms.  Psychiatry evaluated patient recommended physician emergency certificate (PEC).  Continue PEC for now.    Lactic acidosis  Likely secondary to severe acute pancreatitis.  Resolving with volume resuscitation.    Hypertriglyceridemia  Continue to correct marked hypertriglyceridemia with continuous insulin infusion and fenofibrate.    Alcohol dependence with withdrawal  Improivng.  Continue to treat withdrawal symptoms as needed.      VTE Risk Mitigation (From admission, onward)         Ordered     enoxaparin injection 40 mg  Daily         06/30/22 0846     IP VTE HIGH RISK PATIENT  Once          06/28/22 2003     Place sequential compression device  Until discontinued         06/28/22 2003                Nba Johnson MD  Department of Hospital Medicine   [facility] Intensive [facility]

## 2022-07-03 NOTE — PROGRESS NOTES
[facility] Pulmonary/Critical Care Fellow Progress Note:    Primary Attending:  Nba Johnson MD   Consultant Attending: David Chopra MD   Consultant Fellow: Bozena Valdes MD     Admit Date: 2022   Hospital LOS: 5     Brief Summary of Hospitalation:  37 M with class III obesity, Bipolar 1 disorder, EtOH abuse who presented to [facility] w/o co abdominal pain, nausea and vomiting x 2 days and was admitted with EtOH and triglyceride associated pancreatitis. Initial TG level 5680 requiring IV insulin drip and ICU admission. His course is also c/b withdrawal from EtOH for which he is currently receiving precedex.    Interval Hx:  Seen and examined this am. Feels well overall. Is very concerned about his psych meds and would like to speak with a psychiatrist prior to discharge. TG downtrended to 531 yesterday evening, likely will be able to stop insulin gtt and D10 today.      Objective:  Last 24 Hour Vital Signs:  BP  Min: 106/59  Max: 168/106  Temp  Av.9 °F (37.2 °C)  Min: 97.9 °F (36.6 °C)  Max: 99.7 °F (37.6 °C)  Pulse  Av  Min: 85  Max: 129  Resp  Av.8  Min: 10  Max: 34  SpO2  Av.1 %  Min: 93 %  Max: 99 %  Body mass index is 45.25 kg/m².  I & O (Last 24H):    Intake/Output Summary (Last 24 hours) at 7/3/2022 0841  Last data filed at 7/3/2022 0700  Gross per 24 hour   Intake 7259.16 ml   Output 3751 ml   Net 3508.16 ml       Physical Exam:  GEN: non-toxic appearing and appears stated age, obese  HEENT: MMM, no scleral icterus, EOMI  NECK: Supple, midline trachea, no raised JVP or a-waves notable  CV: RRR, no MRG, pulses equal and symmetric 2+ at radial  PULM: CTAB, no W/R/R, on room air.  ABDOMEN: Grossly distended, no TTP, no rebound  SKIN: Warm, dry, intact, no rashes  MSK: No deformity, no clubbing, cyanosis, 2+ edema to BL knees   NEURO: RASS 0, CAM -, awake and following commands, at neurologic baseline  LINES: Intact, no extravasation or induration, no erythema to PIV or support  devices    I have reviewed all labs / images / cultures  Pertinent labs are as follows:   No results for input(s): PH, PCO2, PO2, HCO3, POCSATURATED, BE in the last 24 hours.  Recent Labs   Lab 07/03/22 0427   WBC 10.11   RBC 3.33*   HGB 10.0*   HCT 29.5*      MCV 89   MCH 30.0   MCHC 33.9     Recent Labs   Lab 07/03/22 0427      K 3.2*      CO2 24   BUN 4*   CREATININE 1.3     Lab Results   Component Value Date    TRIG 531 (H) 07/02/2022    TRIG 777 (H) 07/02/2022    TRIG 3,473 (H) 07/01/2022     Meds:   atorvastatin  80 mg Oral Daily    enoxaparin  40 mg Subcutaneous Daily    fenofibrate  160 mg Oral Daily    folic acid  1 mg Oral Daily    multivitamin  1 tablet Oral Daily    pantoprazole  40 mg Oral Daily    polyethylene glycol  17 g Oral BID    potassium chloride  40 mEq Oral Q4H    QUEtiapine  300 mg Oral QHS    sodium chloride 0.9%  10 mL Intravenous Q6H    thiamine  100 mg Oral Daily      dextrose 10 % in water (D10W) 100 mL/hr at 07/03/22 0835    insulin regular 1 units/mL infusion orderable (DKA) 6 Units/hr (07/03/22 0700)       6/29/22 CTAP: Retroperitoneal edema felt to represent acute pancreatitis. Please correlate with laboratory values. No focal fluid collection. Hepatomegaly with hepatic steatosis.    Assessment & Plan: Lola Wetzel is a 37 y.o. M with class III obesity, EtOH abuse, and Bipolar 1 disorder who presented with pancreatitis in setting of hypertriglyceridemia and EtOH abuse. He is admitted to ICU for IV insulin and precedex drip.    Problem List:  1. Acute Pancreatitis  2. EtOH abuse with current withdrawal  3. Non-oliguric JOSE  4. Elevated blood pressure in a patient without hypertension  5. Suicidal Ideation  6. Bipolar 1 disorder  7. Leukocytosis  8. Lactic acidosis resolved  9. Hyponatremia    Plan by System:  NEURO:   At neurologic baseline    Morphine 2mg q 4h prn for pain control     CV:  Stable    PULM:  FREDDY.     FEN/RENAL:  Hyponatremia -  Resolved:    118 on admission now normalized. Possible beer potomania as it rapidly improved with cessation of EtOH consumption while inpatient.    JOSE: UA is bland, 2+ proteinuria, 1+ blood, otherwise not reactive. Organ failure in setting of pancreatitis is likely. He is grossly distended on exam. Making great urine, responded to lasix yesterday, but renal indicis unfortunately slightly worse today.  · CPK very modestly elevated at 377    GI/HEP:  Triglyceride and EtOH Induced Pancreatitis: Heavy EtOH abuse, chronic, recently worse. TG on admission >5600 with CTAP showing peripancreatic edema.   · Insulin initiated and TG downtrended to 1697, but then neha again.   · Downtrended to 531 on 7/2, repeat pending this morning  · Decreased D10 gtt as pt has not been hypoglycemic. Continue insulin gtt until TG < 500  · On fenofibrate and statin      ENDO:  D10% infusion to offset insulin    PSYCH:  Bipolar Disorder 1 with Suicidal Ideation:   · Appreciate psych - patient would like to speak to psych prior to discharge    EtOH abuse with withdrawal: has had tachycardia and elevated systolics in past 24-48h, also with HA, nausea, and anxiety.    · CIWA scale  · PRN BZD substitute for precedex.     This plan was discussed with staff pulmonologist Dr. Chopra    We will sign off, thank you for the opportunity to be involved in ./Ms. Wetzel's care.    Bozena Valdes MD  Pulmonary and Critical Care Fellow  07/03/2022  11:08 AM

## 2022-07-03 NOTE — NURSING
Pt belongings bag noted to be outside pt room.  Security called to collect belongings.     Shirt   Pants  Riley crocks - shoes  ipad pro  Chargers

## 2022-07-03 NOTE — ASSESSMENT & PLAN NOTE
Continue to correct marked hypertriglyceridemia with continuous insulin infusion, fenofibrate, and statin therapy.

## 2022-07-03 NOTE — SUBJECTIVE & OBJECTIVE
Interval History: Triglycerides improving.    Review of Systems   Constitutional:  Negative for chills and fever.   Respiratory:  Negative for shortness of breath.    Cardiovascular:  Negative for chest pain.   Gastrointestinal:  Positive for abdominal distention. Negative for abdominal pain, constipation, diarrhea, nausea and vomiting.   Psychiatric/Behavioral:  Negative for agitation.      Objective:     Vital Signs (Most Recent):  Temp: 99.7 °F (37.6 °C) (07/03/22 0705)  Pulse: (!) 114 (07/03/22 1000)  Resp: (!) 21 (07/03/22 1038)  BP: 118/61 (07/03/22 1000)  SpO2: (!) 94 % (07/03/22 1000)   Vital Signs (24h Range):  Temp:  [97.9 °F (36.6 °C)-99.7 °F (37.6 °C)] 99.7 °F (37.6 °C)  Pulse:  [] 114  Resp:  [10-34] 21  SpO2:  [93 %-99 %] 94 %  BP: (106-168)/() 118/61     Weight: (!) 164.2 kg (361 lb 15.9 oz)  Body mass index is 45.25 kg/m².    Intake/Output Summary (Last 24 hours) at 7/3/2022 1042  Last data filed at 7/3/2022 0700  Gross per 24 hour   Intake 7259.16 ml   Output 2876 ml   Net 4383.16 ml        Physical Exam  Constitutional:       Appearance: He is obese. He is not ill-appearing.   Cardiovascular:      Rate and Rhythm: Normal rate and regular rhythm.      Heart sounds: Normal heart sounds. No murmur heard.    No friction rub. No gallop.   Pulmonary:      Effort: Pulmonary effort is normal. No respiratory distress.      Breath sounds: Normal breath sounds. No wheezing or rales.   Abdominal:      General: Bowel sounds are decreased. There is distension.      Palpations: Abdomen is soft.   Musculoskeletal:         General: No tenderness. Normal range of motion.   Skin:     General: Skin is warm and dry.      Coloration: Skin is not pale.      Findings: No erythema or rash.   Neurological:      Mental Status: He is alert and oriented to person, place, and time.       Significant Labs: All pertinent labs within the past 24 hours have been reviewed.    Significant Imaging: I have reviewed all  pertinent imaging results/findings within the past 24 hours.

## 2022-07-03 NOTE — PROGRESS NOTES
"[facility] Medicine  Progress Note    Patient Name: Lola Wetzel  MRN: [id number]  Patient Class: IP- Inpatient   Admission Date: 6/28/2022  Length of Stay: 5 days  Attending Physician: Nba Johnson MD  Primary Care Provider: Janet Alaniz DO        Subjective:     Principal Problem:Acute pancreatitis        HPI:  Per Ileana Uribe, DNP:    "Mr Lola is a 37 yr old male with PMH that includes insomnia and bipolar 1 disorder. He came to the ED with abdominal pain, nausea, vomiting, and reported alcohol withdrawals. Pt also reports gaining 50 lbs over the past 6 months. He has been having severe insomnia for the past 30 days due to multiple stressors in his life. He was taking seroquel but it has not been working. He drinks immeasurable amounts of non-specific alcoholic beverages daily. He has not been able to keep anything down for the past two days. Pain is located in his left upper quadrant and is worse with palpation. The pain is described as cramping and started two days ago. Pt has had decreased intake and output over the past few days. He reports episodes of chills and sweats at home. Pt has allergy to ativan but is able to take diazepam. He has been admitted to hospital medicine and placed in the ICU."      Overview/Hospital Course:  Patient is a 37-year-old man with evidence of severe acute pancreatitis secondary to severe triglyceridemia and alcohol abuse. Patient presented with fever, leukocytosis, and severe lactic acidosis.  Severe inflammatory response likely due to acute pancreatitis without convincing evidence of a source of infection at this time.  Patient treated aggressive isotonic intravenous fluid resuscitation.  Lactic acidosis improving with volume resuscitation.       Patient with significant agitation overnight likely related to confusion related to metabolic encephalopathy and also alcohol withdrawal.  Patient started continuous " dexmedetomidine with improvement in mental status/agitation. Psychiatry service consulted and recommended PEC due to grave debility related to substance abuse and concern for underlying mental illness.       Patient also started on continuous infusion to treat severe hypertriglyceridemia.   Patient abdominal pain improving and now tolerating low fat diet.      Interval History: Triglycerides improving.    Review of Systems   Constitutional:  Negative for chills and fever.   Respiratory:  Negative for shortness of breath.    Cardiovascular:  Negative for chest pain.   Gastrointestinal:  Positive for abdominal distention. Negative for abdominal pain, constipation, diarrhea, nausea and vomiting.   Psychiatric/Behavioral:  Negative for agitation.      Objective:     Vital Signs (Most Recent):  Temp: 99.7 °F (37.6 °C) (07/03/22 0705)  Pulse: (!) 114 (07/03/22 1000)  Resp: (!) 21 (07/03/22 1038)  BP: 118/61 (07/03/22 1000)  SpO2: (!) 94 % (07/03/22 1000)   Vital Signs (24h Range):  Temp:  [97.9 °F (36.6 °C)-99.7 °F (37.6 °C)] 99.7 °F (37.6 °C)  Pulse:  [] 114  Resp:  [10-34] 21  SpO2:  [93 %-99 %] 94 %  BP: (106-168)/() 118/61     Weight: (!) 164.2 kg (361 lb 15.9 oz)  Body mass index is 45.25 kg/m².    Intake/Output Summary (Last 24 hours) at 7/3/2022 1042  Last data filed at 7/3/2022 0700  Gross per 24 hour   Intake 7259.16 ml   Output 2876 ml   Net 4383.16 ml        Physical Exam  Constitutional:       Appearance: He is obese. He is not ill-appearing.   Cardiovascular:      Rate and Rhythm: Normal rate and regular rhythm.      Heart sounds: Normal heart sounds. No murmur heard.    No friction rub. No gallop.   Pulmonary:      Effort: Pulmonary effort is normal. No respiratory distress.      Breath sounds: Normal breath sounds. No wheezing or rales.   Abdominal:      General: Bowel sounds are decreased. There is distension.      Palpations: Abdomen is soft.   Musculoskeletal:         General: No tenderness.  Normal range of motion.   Skin:     General: Skin is warm and dry.      Coloration: Skin is not pale.      Findings: No erythema or rash.   Neurological:      Mental Status: He is alert and oriented to person, place, and time.       Significant Labs: All pertinent labs within the past 24 hours have been reviewed.    Significant Imaging: I have reviewed all pertinent imaging results/findings within the past 24 hours.      Assessment/Plan:      * Acute pancreatitis  Severe acute pancreatitis secondary to alcohol abuse and severe hypertriglyceridemia.  Ultrasound negative for biliary obstruction.  Triglycerides trending down.  Continue continuous insulin infusion. Continue fenofibrate.  Started on statin therapy on 7/2/2022.  Plan to discontinue continuous dextrose and insulin once triglycerides less than <500 mg/dL. Continue low fat diet and tolerating some oral intake.  Continue antiemetics and pain medication as needed.    Acute metabolic encephalopathy  Secondary to severe inflammatory response from acute pancreatitis, alcohol abuse/withdrawal, and possibly underlying mental illness.  Agitation improved.  Continue to wean dexmedetomidine infusion.  As needed benzodiazepines for withdrawal symptoms.  Psychiatry evaluated patient recommended physician emergency certificate (PEC).  Continue PEC for now.    Hypertriglyceridemia  Continue to correct marked hypertriglyceridemia with continuous insulin infusion, fenofibrate, and statin therapy.    Alcohol dependence with withdrawal  Improivng.  Continue to treat withdrawal symptoms as needed.      VTE Risk Mitigation (From admission, onward)         Ordered     enoxaparin injection 40 mg  Daily         06/30/22 0846     IP VTE HIGH RISK PATIENT  Once         06/28/22 2003     Place sequential compression device  Until discontinued         06/28/22 2003                Nba Johnson MD  Department of Hospital Medicine   [facility] ([place])

## 2022-07-03 NOTE — ASSESSMENT & PLAN NOTE
Severe acute pancreatitis secondary to alcohol abuse and severe hypertriglyceridemia.  Ultrasound negative for biliary obstruction.  Triglycerides trending down.  Continue continuous insulin infusion. Continue fenofibrate.  Started on statin therapy on 7/2/2022.  Plan to discontinue continuous dextrose and insulin once triglycerides less than <500 mg/dL. Continue low fat diet and tolerating some oral intake.  Continue antiemetics and pain medication as needed.

## 2022-07-03 NOTE — PROGRESS NOTES
Chief Complaint / Reason for Consult: acute pancreatitis    ROS: Continues to feel well. Still tolerating low fat cardiac diet, no GI bleeding. No abdominal pain.      Patient Vitals for the past 24 hrs:   BP Temp Temp src Pulse Resp SpO2   07/03/22 0900 (!) 158/84 -- -- (!) 115 16 (!) 94 %   07/03/22 0840 (!) 157/87 -- -- (!) 116 18 (!) 94 %   07/03/22 0728 -- -- -- -- (!) 24 --   07/03/22 0705 (!) 106/59 99.7 °F (37.6 °C) Axillary (!) 111 18 (!) 93 %   07/03/22 0500 121/76 -- -- (!) 115 19 (!) 93 %   07/03/22 0429 -- 99 °F (37.2 °C) Oral -- (!) 24 99 %   07/03/22 0400 124/64 -- -- (!) 116 19 --   07/03/22 0300 136/79 -- -- (!) 114 16 --   07/03/22 0200 (!) 161/77 -- -- (!) 114 16 --   07/03/22 0102 (!) 154/87 -- -- 110 (!) 34 --   07/03/22 0000 (!) 156/114 -- -- 108 -- 99 %   07/02/22 2305 -- 97.9 °F (36.6 °C) Oral -- (!) 24 --   07/02/22 2247 (!) 143/97 -- -- (!) 119 10 95 %   07/02/22 2215 (!) 160/108 -- -- 110 (!) 25 96 %   07/02/22 2200 (!) 161/107 -- -- (!) 114 (!) 22 97 %   07/02/22 2158 -- -- -- -- (!) 22 --   07/02/22 2145 (!) 160/98 -- -- (!) 112 19 95 %   07/02/22 2130 (!) 160/107 -- -- (!) 112 15 96 %   07/02/22 2115 (!) 166/108 -- -- (!) 112 (!) 23 97 %   07/02/22 2100 (!) 168/106 -- -- (!) 113 (!) 21 96 %   07/02/22 2045 (!) 157/99 -- -- (!) 116 (!) 22 95 %   07/02/22 2030 (!) 147/101 -- -- (!) 122 (!) 23 95 %   07/02/22 2015 (!) 146/91 -- -- (!) 122 14 96 %   07/02/22 2000 (!) 157/82 -- -- (!) 128 (!) 26 97 %   07/02/22 1945 125/67 -- -- (!) 119 (!) 31 95 %   07/02/22 1938 -- -- -- (!) 117 19 96 %   07/02/22 1930 106/65 -- -- (!) 119 (!) 28 95 %   07/02/22 1923 -- 99.3 °F (37.4 °C) Oral -- -- --   07/02/22 1801 136/85 -- -- (!) 125 (!) 21 95 %   07/02/22 1800 136/85 -- -- (!) 124 (!) 27 96 %   07/02/22 1725 -- -- -- -- 20 --   07/02/22 1701 -- -- -- (!) 129 (!) 22 96 %   07/02/22 1601 (!) 140/80 -- -- 100 20 96 %   07/02/22 1501 -- 98 °F (36.7 °C) -- (!) 121 (!) 26 98 %   07/02/22 1401 (!) 149/90 --  -- (!) 119 (!) 26 97 %   07/02/22 1301 138/86 -- -- 109 (!) 24 96 %   07/02/22 1201 (!) 145/71 -- -- 100 20 99 %   07/02/22 1200 132/73 -- -- 102 (!) 27 95 %   07/02/22 1143 -- -- -- -- 20 --   07/02/22 1101 -- 99.2 °F (37.3 °C) -- 98 20 99 %       Physical Exam:  Gen - Well developed, well nourished, no apparent distress  HEENT - Anicteric, MMM  CV - S1, S2, no murmurs/rubs  Lungs - CTA-B, normal excursion  Abd - Soft, NT, ND, normal BS's, no appreciable HSM, obese  Ext - No c/c/e  Neuro - No asterixis  Psych- mildly anxious    Labs:  Recent Labs   Lab 07/03/22  0427   WBC 10.11   RBC 3.33*   HGB 10.0*   HCT 29.5*      MCV 89   MCH 30.0   MCHC 33.9     Recent Labs   Lab 07/03/22  0427   CALCIUM 8.3*   PROT 6.2      K 3.2*   CO2 24      BUN 4*   CREATININE 1.3   ALKPHOS 213*   ALT 68*   *   BILITOT 1.1*      Latest Reference Range & Units 07/03/22 04:27   Triglycerides 30 - 150 mg/dL 473 (H)   (H): Data is abnormally high  No results for input(s): PT, INR, APTT in the last 24 hours.    Imaging: no new      Assessment:  37 y.o. man with hx of bipolar disorder and alcohol abuse admitted with severe pancreatitis 2/2 hypertriglyceridemia and alcohol.  His symptoms are improving. CT unremarkable.TGs trending down- now less than 500    Recommendations:  1. May wean insulin/ D10  2. Continue fenofibrate and atorvostatin  3. Continue low fat diet  4. Weight loss  5. ETOH cessation  6. MVI, thiamine and folate  7. Outpatient endocrine f/u  8. Establish therapist along with psychiatry

## 2022-07-03 NOTE — SUBJECTIVE & OBJECTIVE
Interval History: Triglycerides improving.    Review of Systems   Constitutional:  Negative for chills and fever.   Respiratory:  Negative for shortness of breath.    Cardiovascular:  Negative for chest pain.   Gastrointestinal:  Positive for abdominal distention. Negative for abdominal pain, constipation, diarrhea, nausea and vomiting.   Psychiatric/Behavioral:  Negative for agitation.      Objective:     Vital Signs (Most Recent):  Temp: 99.3 °F (37.4 °C) (07/02/22 1923)  Pulse: (!) 128 (07/02/22 2000)  Resp: (!) 26 (07/02/22 2000)  BP: (!) 157/82 (07/02/22 2000)  SpO2: 97 % (07/02/22 2000)   Vital Signs (24h Range):  Temp:  [98 °F (36.7 °C)-99.3 °F (37.4 °C)] 99.3 °F (37.4 °C)  Pulse:  [] 128  Resp:  [13-34] 26  SpO2:  [93 %-99 %] 97 %  BP: (106-157)/(65-90) 157/82     Weight: (!) 164.2 kg (361 lb 15.9 oz)  Body mass index is 45.25 kg/m².    Intake/Output Summary (Last 24 hours) at 7/2/2022 2052  Last data filed at 7/2/2022 2000  Gross per 24 hour   Intake 1551 ml   Output 3750 ml   Net -2199 ml        Physical Exam  Constitutional:       Appearance: He is obese. He is not ill-appearing.   Cardiovascular:      Rate and Rhythm: Normal rate and regular rhythm.      Heart sounds: Normal heart sounds. No murmur heard.    No friction rub. No gallop.   Pulmonary:      Effort: Pulmonary effort is normal. No respiratory distress.      Breath sounds: Normal breath sounds. No wheezing or rales.   Abdominal:      General: Bowel sounds are decreased. There is distension.      Palpations: Abdomen is soft.   Musculoskeletal:         General: No tenderness. Normal range of motion.   Skin:     General: Skin is warm and dry.      Coloration: Skin is not pale.      Findings: No erythema or rash.   Neurological:      Mental Status: He is alert and oriented to person, place, and time.       Significant Labs: All pertinent labs within the past 24 hours have been reviewed.    Significant Imaging: I have reviewed all pertinent  imaging results/findings within the past 24 hours.

## 2022-07-04 PROBLEM — G47.00 INSOMNIA: Status: ACTIVE | Noted: 2022-07-04

## 2022-07-04 LAB
ALBUMIN SERPL BCP-MCNC: 1.9 G/DL (ref 3.5–5.2)
ALP SERPL-CCNC: 226 U/L (ref 55–135)
ALT SERPL W/O P-5'-P-CCNC: 72 U/L (ref 10–44)
ANION GAP SERPL CALC-SCNC: 11 MMOL/L (ref 8–16)
ANISOCYTOSIS BLD QL SMEAR: SLIGHT
AST SERPL-CCNC: 127 U/L (ref 10–40)
BASOPHILS # BLD AUTO: ABNORMAL K/UL (ref 0–0.2)
BASOPHILS NFR BLD: 2 % (ref 0–1.9)
BILIRUB SERPL-MCNC: 1.1 MG/DL (ref 0.1–1)
BUN SERPL-MCNC: 4 MG/DL (ref 6–20)
CALCIUM SERPL-MCNC: 8.5 MG/DL (ref 8.7–10.5)
CHLORIDE SERPL-SCNC: 101 MMOL/L (ref 95–110)
CO2 SERPL-SCNC: 27 MMOL/L (ref 23–29)
CREAT SERPL-MCNC: 1.3 MG/DL (ref 0.5–1.4)
DIFFERENTIAL METHOD: ABNORMAL
EOSINOPHIL # BLD AUTO: ABNORMAL K/UL (ref 0–0.5)
EOSINOPHIL NFR BLD: 2 % (ref 0–8)
ERYTHROCYTE [DISTWIDTH] IN BLOOD BY AUTOMATED COUNT: 17.7 % (ref 11.5–14.5)
EST. GFR  (AFRICAN AMERICAN): >60 ML/MIN/1.73 M^2
EST. GFR  (NON AFRICAN AMERICAN): >60 ML/MIN/1.73 M^2
GLUCOSE SERPL-MCNC: 103 MG/DL (ref 70–110)
HCT VFR BLD AUTO: 31 % (ref 40–54)
HGB BLD-MCNC: 10.1 G/DL (ref 14–18)
HYPOCHROMIA BLD QL SMEAR: ABNORMAL
IMM GRANULOCYTES # BLD AUTO: ABNORMAL K/UL (ref 0–0.04)
IMM GRANULOCYTES NFR BLD AUTO: ABNORMAL % (ref 0–0.5)
LYMPHOCYTES # BLD AUTO: ABNORMAL K/UL (ref 1–4.8)
LYMPHOCYTES NFR BLD: 28 % (ref 18–48)
MCH RBC QN AUTO: 29.7 PG (ref 27–31)
MCHC RBC AUTO-ENTMCNC: 32.6 G/DL (ref 32–36)
MCV RBC AUTO: 91 FL (ref 82–98)
METAMYELOCYTES NFR BLD MANUAL: 3 %
MONOCYTES # BLD AUTO: ABNORMAL K/UL (ref 0.3–1)
MONOCYTES NFR BLD: 9 % (ref 4–15)
MYELOCYTES NFR BLD MANUAL: 2 %
NEUTROPHILS NFR BLD: 46 % (ref 38–73)
NEUTS BAND NFR BLD MANUAL: 8 %
NRBC BLD-RTO: 1 /100 WBC
PLATELET # BLD AUTO: 259 K/UL (ref 150–450)
PLATELET BLD QL SMEAR: ABNORMAL
PMV BLD AUTO: 9.8 FL (ref 9.2–12.9)
POIKILOCYTOSIS BLD QL SMEAR: SLIGHT
POLYCHROMASIA BLD QL SMEAR: ABNORMAL
POTASSIUM SERPL-SCNC: 3.8 MMOL/L (ref 3.5–5.1)
PROT SERPL-MCNC: 6.3 G/DL (ref 6–8.4)
RBC # BLD AUTO: 3.4 M/UL (ref 4.6–6.2)
SODIUM SERPL-SCNC: 139 MMOL/L (ref 136–145)
TARGETS BLD QL SMEAR: ABNORMAL
TRIGL SERPL-MCNC: 560 MG/DL (ref 30–150)
WBC # BLD AUTO: 7.62 K/UL (ref 3.9–12.7)

## 2022-07-04 PROCEDURE — 80053 COMPREHEN METABOLIC PANEL: CPT | Performed by: STUDENT IN AN ORGANIZED HEALTH CARE EDUCATION/TRAINING PROGRAM

## 2022-07-04 PROCEDURE — 94761 N-INVAS EAR/PLS OXIMETRY MLT: CPT

## 2022-07-04 PROCEDURE — 63600175 PHARM REV CODE 636 W HCPCS: Performed by: NURSE PRACTITIONER

## 2022-07-04 PROCEDURE — 25000242 PHARM REV CODE 250 ALT 637 W/ HCPCS: Performed by: HOSPITALIST

## 2022-07-04 PROCEDURE — 25000003 PHARM REV CODE 250: Performed by: NURSE PRACTITIONER

## 2022-07-04 PROCEDURE — 25000003 PHARM REV CODE 250: Performed by: HOSPITALIST

## 2022-07-04 PROCEDURE — 84478 ASSAY OF TRIGLYCERIDES: CPT | Performed by: INTERNAL MEDICINE

## 2022-07-04 PROCEDURE — 63600175 PHARM REV CODE 636 W HCPCS: Performed by: HOSPITALIST

## 2022-07-04 PROCEDURE — 99233 PR SUBSEQUENT HOSPITAL CARE,LEVL III: ICD-10-PCS | Mod: ,,, | Performed by: HOSPITALIST

## 2022-07-04 PROCEDURE — 85027 COMPLETE CBC AUTOMATED: CPT | Performed by: STUDENT IN AN ORGANIZED HEALTH CARE EDUCATION/TRAINING PROGRAM

## 2022-07-04 PROCEDURE — 25000003 PHARM REV CODE 250: Performed by: STUDENT IN AN ORGANIZED HEALTH CARE EDUCATION/TRAINING PROGRAM

## 2022-07-04 PROCEDURE — 85007 BL SMEAR W/DIFF WBC COUNT: CPT | Performed by: STUDENT IN AN ORGANIZED HEALTH CARE EDUCATION/TRAINING PROGRAM

## 2022-07-04 PROCEDURE — 99222 1ST HOSP IP/OBS MODERATE 55: CPT | Mod: 95,,, | Performed by: PSYCHIATRY & NEUROLOGY

## 2022-07-04 PROCEDURE — 99233 SBSQ HOSP IP/OBS HIGH 50: CPT | Mod: ,,, | Performed by: HOSPITALIST

## 2022-07-04 PROCEDURE — 99222 PR INITIAL HOSPITAL CARE,LEVL II: ICD-10-PCS | Mod: 95,,, | Performed by: PSYCHIATRY & NEUROLOGY

## 2022-07-04 PROCEDURE — 11000001 HC ACUTE MED/SURG PRIVATE ROOM

## 2022-07-04 RX ORDER — MUPIROCIN 20 MG/G
OINTMENT TOPICAL 2 TIMES DAILY
Status: DISCONTINUED | OUTPATIENT
Start: 2022-07-04 | End: 2022-07-07 | Stop reason: HOSPADM

## 2022-07-04 RX ORDER — ZOLPIDEM TARTRATE 5 MG/1
10 TABLET ORAL NIGHTLY PRN
Status: DISCONTINUED | OUTPATIENT
Start: 2022-07-04 | End: 2022-07-07 | Stop reason: HOSPADM

## 2022-07-04 RX ADMIN — DIAZEPAM 10 MG: 5 INJECTION, SOLUTION INTRAMUSCULAR; INTRAVENOUS at 02:07

## 2022-07-04 RX ADMIN — MORPHINE SULFATE 2 MG: 2 INJECTION, SOLUTION INTRAMUSCULAR; INTRAVENOUS at 08:07

## 2022-07-04 RX ADMIN — MORPHINE SULFATE 2 MG: 2 INJECTION, SOLUTION INTRAMUSCULAR; INTRAVENOUS at 04:07

## 2022-07-04 RX ADMIN — PANTOPRAZOLE SODIUM 40 MG: 40 TABLET, DELAYED RELEASE ORAL at 08:07

## 2022-07-04 RX ADMIN — DIAZEPAM 10 MG: 5 INJECTION, SOLUTION INTRAMUSCULAR; INTRAVENOUS at 04:07

## 2022-07-04 RX ADMIN — HYDROMORPHONE HYDROCHLORIDE 1 MG: 1 INJECTION, SOLUTION INTRAMUSCULAR; INTRAVENOUS; SUBCUTANEOUS at 10:07

## 2022-07-04 RX ADMIN — THERA TABS 1 TABLET: TAB at 08:07

## 2022-07-04 RX ADMIN — ATORVASTATIN CALCIUM 80 MG: 20 TABLET, FILM COATED ORAL at 10:07

## 2022-07-04 RX ADMIN — ENOXAPARIN SODIUM 40 MG: 40 INJECTION SUBCUTANEOUS at 04:07

## 2022-07-04 RX ADMIN — MORPHINE SULFATE 2 MG: 2 INJECTION, SOLUTION INTRAMUSCULAR; INTRAVENOUS at 12:07

## 2022-07-04 RX ADMIN — DIAZEPAM 10 MG: 5 INJECTION, SOLUTION INTRAMUSCULAR; INTRAVENOUS at 10:07

## 2022-07-04 RX ADMIN — FENOFIBRATE 160 MG: 160 TABLET ORAL at 08:07

## 2022-07-04 RX ADMIN — HYDROMORPHONE HYDROCHLORIDE 1 MG: 1 INJECTION, SOLUTION INTRAMUSCULAR; INTRAVENOUS; SUBCUTANEOUS at 02:07

## 2022-07-04 RX ADMIN — PROMETHAZINE HYDROCHLORIDE 25 MG: 25 INJECTION INTRAMUSCULAR; INTRAVENOUS at 08:07

## 2022-07-04 RX ADMIN — QUETIAPINE FUMARATE 300 MG: 200 TABLET ORAL at 08:07

## 2022-07-04 RX ADMIN — DIAZEPAM 10 MG: 5 INJECTION, SOLUTION INTRAMUSCULAR; INTRAVENOUS at 08:07

## 2022-07-04 RX ADMIN — MUPIROCIN: 20 OINTMENT TOPICAL at 08:07

## 2022-07-04 RX ADMIN — ZOLPIDEM TARTRATE 10 MG: 5 TABLET ORAL at 09:07

## 2022-07-04 RX ADMIN — THIAMINE HCL TAB 100 MG 100 MG: 100 TAB at 08:07

## 2022-07-04 RX ADMIN — FOLIC ACID 1 MG: 1 TABLET ORAL at 08:07

## 2022-07-04 NOTE — NURSING
Patient awake, alert and oriented. Very pleasant. MD rounded. Reconsult to psyc. Dr. Bowman recommends patient is ok for DC of CEC. CEC DCed.

## 2022-07-04 NOTE — PROGRESS NOTES
"[facility] Medicine  Progress Note    Patient Name: Lola Wetzel  MRN: [id number]  Patient Class: IP- Inpatient   Admission Date: 6/28/2022  Length of Stay: 6 days  Attending Physician: Nba Johnson MD  Primary Care Provider: Janet Alaniz DO        Subjective:     Principal Problem:Acute pancreatitis        HPI:  Per Ileana Uribe, DNP:    "Mr Lola is a 37 yr old male with PMH that includes insomnia and bipolar 1 disorder. He came to the ED with abdominal pain, nausea, vomiting, and reported alcohol withdrawals. Pt also reports gaining 50 lbs over the past 6 months. He has been having severe insomnia for the past 30 days due to multiple stressors in his life. He was taking seroquel but it has not been working. He drinks immeasurable amounts of non-specific alcoholic beverages daily. He has not been able to keep anything down for the past two days. Pain is located in his left upper quadrant and is worse with palpation. The pain is described as cramping and started two days ago. Pt has had decreased intake and output over the past few days. He reports episodes of chills and sweats at home. Pt has allergy to ativan but is able to take diazepam. He has been admitted to hospital medicine and placed in the ICU."      Overview/Hospital Course:  Patient is a 37-year-old man with evidence of severe acute pancreatitis secondary to severe triglyceridemia and alcohol abuse. Patient presented with fever, leukocytosis, and severe lactic acidosis.  Severe inflammatory response likely due to acute pancreatitis without convincing evidence of a source of infection at this time.  Patient treated aggressive isotonic intravenous fluid resuscitation.  Lactic acidosis improving with volume resuscitation.       Patient with significant agitation overnight likely related to confusion related to metabolic encephalopathy and also alcohol withdrawal.  Patient started continuous dexmedetomidine " with improvement in mental status/agitation. Psychiatry service consulted and recommended PEC due to grave debility related to substance abuse and concern for underlying mental illness.       Patient also started on continuous infusion to treat severe hypertriglyceridemia.   Patient abdominal pain improving and now tolerating low fat diet.      Interval History: Slept well. Tolerating oral intake.    Review of Systems   Constitutional:  Negative for chills and fever.   Respiratory:  Negative for shortness of breath.    Cardiovascular:  Negative for chest pain.   Gastrointestinal:  Negative for abdominal distention, abdominal pain, constipation, diarrhea, nausea and vomiting.   Psychiatric/Behavioral:  Negative for agitation.      Objective:     Vital Signs (Most Recent):  Temp: 99.1 °F (37.3 °C) (07/04/22 0802)  Pulse: (!) 111 (07/04/22 1611)  Resp: 18 (07/04/22 1637)  BP: (!) 142/97 (07/04/22 1611)  SpO2: (!) 93 % (07/04/22 1611)   Vital Signs (24h Range):  Temp:  [99 °F (37.2 °C)-99.3 °F (37.4 °C)] 99.1 °F (37.3 °C)  Pulse:  [106-120] 111  Resp:  [13-36] 18  SpO2:  [90 %-97 %] 93 %  BP: (120-178)/() 142/97     Weight: (!) 164.2 kg (361 lb 15.9 oz)  Body mass index is 45.25 kg/m².  No intake or output data in the 24 hours ending 07/04/22 1822     Physical Exam  Constitutional:       Appearance: He is obese. He is not ill-appearing.   Cardiovascular:      Rate and Rhythm: Normal rate and regular rhythm.      Heart sounds: Normal heart sounds. No murmur heard.    No friction rub. No gallop.   Pulmonary:      Effort: Pulmonary effort is normal. No respiratory distress.      Breath sounds: Normal breath sounds. No wheezing or rales.   Abdominal:      General: Bowel sounds are decreased. There is no distension.      Palpations: Abdomen is soft.   Musculoskeletal:         General: No tenderness. Normal range of motion.   Skin:     General: Skin is warm and dry.      Coloration: Skin is not pale.      Findings: No  erythema or rash.   Neurological:      Mental Status: He is alert and oriented to person, place, and time.       Significant Labs: All pertinent labs within the past 24 hours have been reviewed.    Significant Imaging: I have reviewed all pertinent imaging results/findings within the past 24 hours.      Assessment/Plan:      * Acute pancreatitis  Severe acute pancreatitis secondary to alcohol abuse and severe hypertriglyceridemia.  Ultrasound negative for biliary obstruction.  Triglycerides much improved.  Continuous insulin infusion.  Mild increase in triglycerides today. Continue fenofibrate and statin therapy along with low fat diet.  Stable to transfer to the floor today.    Acute metabolic encephalopathy  Secondary to severe inflammatory response from acute pancreatitis, alcohol abuse/withdrawal, and possibly underlying mental illness.  Agitation improved.  Continue to wean dexmedetomidine infusion.  As needed benzodiazepines for withdrawal symptoms.  Psychiatry evaluated patient recommended physician emergency certificate (PEC).  Re-evaluated today by psychiatry who recommends rescinding PEC.  PEC/CEC rescinded.    Hypertriglyceridemia  Continue fenofibrate, statin therapy, and low fat diet.    Alcohol dependence with withdrawal  Improivng.  Continue to treat withdrawal symptoms as needed.    Insomnia  Patient reports relief with zolpidem along with home dose of quetiapine.  Will continue for now.  Long term zolpidem not advised.      VTE Risk Mitigation (From admission, onward)         Ordered     enoxaparin injection 40 mg  Daily         06/30/22 0846     IP VTE HIGH RISK PATIENT  Once         06/28/22 2003     Place sequential compression device  Until discontinued         06/28/22 2003                Nba Johnson MD  Department of Hospital Medicine   [facility]

## 2022-07-04 NOTE — CONSULTS
"[facility]  Psychiatry  Telepsychiatry Consult Note    Please see previous notes:    Patient agreeable to consultation via telepsychiatry.    Tele-Consultation from Psychiatry started: 7/4/2022 at 0912  The chief complaint leading to psychiatric consultation is: "re-evaluate PEC"  This consultation was requested by  the Medicine Department attending physician.  The location of the consulting psychiatrist is [facility].  The patient location is  [facility] ICU   The patient arrived at the ED at: unknown    Also present with the patient at the time of the consultation: no one    Patient Identification:   Lola Wetzel is a 37 y.o. male.    Patient information was obtained from patient, past medical records and ER records.      Inpatient consult to Telemedicine - Psyc  Consult performed by: Kavon Bowman DO  Consult ordered by: Nba Johnson MD        Consult Start Time: 07/04/2022 09:12 CDT  Consult End Time: 07/04/2022 10:17 CDT        Subjective:     History of Present Illness:  Patient has a long history of unspecified insomnia and RAKESH. Patient reports insomnia has worsened his mood. He denies SI/HI/AVH. Discussed thoroughly the necessity of CPAP which patient has not tried in many years. Patient currently has an outpatient psychiatrist. He engages in treatment plan appropriately and states he needs assistance with his sleep medications and regimen. He is future oriented and hopeful for his future.  Discussed IOP and residential substance rehabilitation to aid in sobriety, patient is not interested at this time. Patient has denied SI for the past 4 days. Nursing reports no behavioral issues thus far. NO objective signs of psychosis, donta, delirium or debilitating depression.        Psychiatric History:   Previous Psychiatric Hospitalizations: [facility] in [place] in 2011 for severe insomnia. He found this hospital stay quite helpful.  Previous Medication Trials: ambien- did not work for sleep  Xanax and " seroquel- he could sleep with this combination  Previous Suicide Attempts:none  History of Violence: no  History of Depression: no  History of Lucrecia:he was unclear about this  History of Auditory/Visual Hallucination denied, some illusions when he does not sleep  History of Delusions: no  Outpatient psychiatrist (current & past): Yes in past     Substance Abuse History:  Tobacco:No  Alcohol: Yes, + tolerance/cravings/withdrawal/inability to cut down/using more than intended  Illicit Substances:No  Detox/Rehab: No     Legal History: Past charges/incarcerations: No      Family Psychiatric History: brother- mood disorder        Social History:  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 Patient reports he also has been going through a divorce in recent years. Recently lost his job. Not communicating with his family right now. Has some close friends in the area.  No access to firearms.     Psychiatric Mental Status Exam:  Arousal: alert  Sensorium/Orientation: oriented to grossly intact  Behavior/Cooperation: normal, cooperative   Speech: normal tone, normal rate, normal pitch, normal volume  Language: grossly intact  Mood: euthymic   Affect: appropriate  Thought Process: normal and logical  Thought Content:   Auditory hallucinations: NO  Visual hallucinations: NO  Paranoia: NO  Delusions:  NO  Suicidal ideation: NO  Homicidal ideation: NO  Attention/Concentration:  intact  Memory:    Recent:  Intact   Remote: Intact     Fund of Knowledge: Aware of current events   Abstract reasoning: similarities were abstract  Insight: has awareness of illness  Judgment: behavior is adequate to circumstances      Past Medical History:   Past Medical History:   Diagnosis Date    Hypertension     Insomnia       Laboratory Data:   Labs Reviewed   CBC W/ AUTO DIFFERENTIAL - Abnormal; Notable for the following components:       Result Value    WBC 13.98 (*)     RBC 3.92 (*)     Hemoglobin 13.5 (*)     Hematocrit 28.9 (*)     MCV 74 (*)      MCH 34.4 (*)     RDW 20.9 (*)     nRBC 1 (*)     All other components within normal limits   LIPASE - Abnormal; Notable for the following components:    Lipase 281 (*)     All other components within normal limits   LACTIC ACID, PLASMA - Abnormal; Notable for the following components:    Lactate (Lactic Acid) 8.7 (*)     All other components within normal limits    Narrative:        critical result(s) called and verbal readback obtained from   BRUCE SHEPHERD RN by GWYN 06/28/2022 17:09   URINALYSIS, REFLEX TO URINE CULTURE - Abnormal; Notable for the following components:    Appearance, UA Hazy (*)     Specific Gravity, UA >=1.030 (*)     Protein, UA 2+ (*)     Ketones, UA Trace (*)     Bilirubin (UA) 1+ (*)     Occult Blood UA 1+ (*)     All other components within normal limits    Narrative:     Specimen Source->Urine   URINALYSIS MICROSCOPIC - Abnormal; Notable for the following components:    Hyaline Casts, UA 2 (*)     All other components within normal limits    Narrative:     Specimen Source->Urine   ISTAT PROCEDURE - Abnormal; Notable for the following components:    POC Potassium 2.3 (*)     POC Ionized Calcium 0.77 (*)     POC Hematocrit 28 (*)     All other components within normal limits   B-TYPE NATRIURETIC PEPTIDE   B-TYPE NATRIURETIC PEPTIDE   SARS-COV-2 RDRP GENE   POCT INFLUENZA A/B MOLECULAR   ISTAT CREATININE       Allergies:   Review of patient's allergies indicates:   Allergen Reactions    Ativan [lorazepam] Other (See Comments)     Muscle spasms in neck & back when taking medication.       Medications in ER:   Medications   ondansetron injection 4 mg (4 mg Intravenous Given 7/3/22 2144)   polyethylene glycol packet 17 g (has no administration in time range)   acetaminophen tablet 650 mg (650 mg Oral Given 7/2/22 2200)   naloxone 0.4 mg/mL injection 0.02 mg (has no administration in time range)   morphine injection 2 mg (2 mg Intravenous Given 7/4/22 2941)   HYDROmorphone injection 1 mg (1 mg  Intravenous Given 7/3/22 1508)   promethazine (PHENERGAN) 25 mg in dextrose 5 % 50 mL IVPB (25 mg Intravenous New Bag 7/4/22 0813)   multivitamin tablet (1 tablet Oral Given 7/4/22 0834)   folic acid tablet 1 mg (1 mg Oral Given 7/4/22 0834)   thiamine tablet 100 mg (100 mg Oral Given 7/4/22 0834)   sodium chloride 0.9% flush 10 mL (10 mLs Intravenous Given 7/3/22 2300)     And   sodium chloride 0.9% flush 10 mL (has no administration in time range)   labetaloL injection 10 mg (10 mg Intravenous Given 6/29/22 0247)   DOBUTamine (DOBUTREX) 1,000 mg/250 mL (4,000 mcg/mL) infusion (  Canceled Entry 6/29/22 1115)   enoxaparin injection 40 mg (40 mg Subcutaneous Given 7/3/22 1650)   polyethylene glycol packet 17 g (17 g Oral Not Given 7/3/22 2100)   fenofibrate tablet 160 mg (160 mg Oral Given 7/4/22 0834)   dextrose 10% bolus 125 mL (has no administration in time range)   dextrose 10% bolus 250 mL (has no administration in time range)   atorvastatin tablet 80 mg (80 mg Oral Given 7/3/22 1027)   pantoprazole EC tablet 40 mg (40 mg Oral Given 7/4/22 0834)   calcium carbonate 200 mg calcium (500 mg) chewable tablet 500 mg (500 mg Oral Given 7/2/22 1215)   diazePAM injection 10 mg (10 mg Intravenous Given 7/3/22 1355)   QUEtiapine tablet 300 mg (300 mg Oral Given 7/3/22 2157)   sodium chloride 0.9% bolus 1,000 mL (0 mLs Intravenous Stopped 6/28/22 1645)   ondansetron injection 4 mg (4 mg Intravenous Given 6/28/22 1551)   ketorolac injection 9.999 mg (9.999 mg Intravenous Given 6/28/22 1634)   potassium bicarbonate disintegrating tablet 40 mEq (40 mEq Oral Given 6/28/22 1715)   sodium chloride 0.9% bolus 1,000 mL (0 mLs Intravenous Stopped 6/28/22 1822)   diphenhydrAMINE injection 12.5 mg (12.5 mg Intravenous Given 6/28/22 1722)   metoclopramide HCl injection 5 mg (5 mg Intravenous Given 6/28/22 1723)   acetaminophen tablet 1,000 mg (1,000 mg Oral Given 6/28/22 1923)   calcium gluconate 1 g in NS IVPB (premixed) (0 g  Intravenous Stopped 6/29/22 0021)   iohexoL (OMNIPAQUE 12) oral solution 1,000 mL (1,000 mLs Oral Given 6/29/22 1346)   iohexoL (OMNIPAQUE 350) injection 100 mL (100 mLs Intravenous Given 6/29/22 1511)   bisacodyL suppository 10 mg (10 mg Rectal Given 6/30/22 1039)   furosemide injection 60 mg (60 mg Intravenous Given 6/30/22 1748)   potassium, sodium phosphates 280-160-250 mg packet 2 packet (2 packets Oral Given 6/30/22 2036)   potassium chloride SA CR tablet 40 mEq (40 mEq Oral Given 6/30/22 2036)   furosemide injection 40 mg (40 mg Intravenous Given 7/1/22 1531)   furosemide injection 40 mg (40 mg Intravenous Given 7/2/22 1410)   potassium chloride SA CR tablet 40 mEq (40 mEq Oral Given 7/2/22 1730)   diazePAM injection 10 mg (10 mg Intravenous Given 7/2/22 1816)   potassium chloride SA CR tablet 40 mEq (40 mEq Oral Given 7/3/22 1027)   HYDROmorphone injection 1 mg (1 mg Intravenous Given 7/3/22 1547)   zolpidem tablet 10 mg (10 mg Oral Given 7/3/22 2157)       Medications at home:   Current Facility-Administered Medications:     acetaminophen tablet 650 mg, 650 mg, Oral, Q4H PRN, Ileana Uribe DNP, 650 mg at 07/02/22 2200    atorvastatin tablet 80 mg, 80 mg, Oral, Daily, Nba Johnson MD, 80 mg at 07/03/22 1027    calcium carbonate 200 mg calcium (500 mg) chewable tablet 500 mg, 500 mg, Oral, TID PRN, Nba Johnson MD, 500 mg at 07/02/22 1215    dextrose 10% bolus 125 mL, 12.5 g, Intravenous, PRN, Nba Johnson MD    dextrose 10% bolus 250 mL, 25 g, Intravenous, PRN, Nba Johnson MD    diazePAM injection 10 mg, 10 mg, Intravenous, Q4H PRN, Nba Johnson MD, 10 mg at 07/03/22 1355    enoxaparin injection 40 mg, 40 mg, Subcutaneous, Daily, Nba Johnson MD, 40 mg at 07/03/22 1650    fenofibrate tablet 160 mg, 160 mg, Oral, Daily, Nba Johnson MD, 160 mg at 07/04/22 0834    folic acid tablet 1 mg, 1 mg, Oral, Daily, Ileana Uribe DNP, 1 mg at 07/04/22 0871     HYDROmorphone injection 1 mg, 1 mg, Intravenous, Q4H PRN, Ileana Uribe DNP, 1 mg at 07/03/22 1508    labetaloL injection 10 mg, 10 mg, Intravenous, Q6H PRN, Bob Walker NP, 10 mg at 06/29/22 0247    morphine injection 2 mg, 2 mg, Intravenous, Q4H PRN, Ileana Uribe DNP, 2 mg at 07/04/22 0826    multivitamin tablet, 1 tablet, Oral, Daily, Ileana Uribe DNP, 1 tablet at 07/04/22 0834    naloxone 0.4 mg/mL injection 0.02 mg, 0.02 mg, Intravenous, PRN, Ileana Uribe DNP    ondansetron injection 4 mg, 4 mg, Intravenous, Q6H PRN, Ileana Uribe DNP, 4 mg at 07/03/22 2144    pantoprazole EC tablet 40 mg, 40 mg, Oral, Daily, Nba Johnson MD, 40 mg at 07/04/22 0834    polyethylene glycol packet 17 g, 17 g, Oral, Daily PRN, Ileana Uribe DNP    polyethylene glycol packet 17 g, 17 g, Oral, BID, Nba Johnson MD, 17 g at 07/01/22 0817    promethazine (PHENERGAN) 25 mg in dextrose 5 % 50 mL IVPB, 25 mg, Intravenous, Q8H PRN, Ileana Uribe DNP, Last Rate: 150 mL/hr at 07/04/22 0813, 25 mg at 07/04/22 0813    QUEtiapine tablet 300 mg, 300 mg, Oral, QHS, Bob Walekr NP, 300 mg at 07/03/22 2157    Flushing PICC Protocol, , , Until Discontinued **AND** sodium chloride 0.9% flush 10 mL, 10 mL, Intravenous, Q6H, 10 mL at 07/03/22 2300 **AND** sodium chloride 0.9% flush 10 mL, 10 mL, Intravenous, PRN, Nba Johnson MD    thiamine tablet 100 mg, 100 mg, Oral, Daily, Luiza De Anda MD, 100 mg at 07/04/22 0834      No new subjective & objective note has been filed under this hospital service since the last note was generated.      Assessment - Diagnosis - Goals:     Diagnosis/Impression:   Alcohol abuse  RAKESH, not on CPAP  Insomnia   SIMD    IMPRESSION:   Patient has a long history of unspecified insomnia and RAKESH. Patient reports insomnia has worsened his mood. He denies SI/HI/AVH. Discussed thoroughly the necessity of CPAP which patient has not tried in  many years. Patient currently has an outpatient psychiatrist. Recommend outpatient sleep medicine referral    RECOMMENDATIONS:     DISPOSITION: Once medically cleared;   -Pt may be discharged home with next of kin with outpt psychaitric follow up. Discussed safety concerns and precautions. Also informed pt to return to ED for any worsening of psychiatric symptoms or any SI/HI/AVH.    PSYCHIATRIC MEDICATIONS  · Scheduled- Defer to outpatient psychiatry  LEGAL    Rescind PEC/CEC because pt is no longer in any imminent danger of hurting self or others and not gravely disabled. Pt currently does not meet criteria nor benefit from from involuntary inpatient psychiatric admission.      OTHER     Consulting clinician was informed of the encounter and consult note.   More than 50% of the time was spent counseling/coordinating care    Please contact ON CALL psychiatry service for any acute issues that may arise or for any further follow up or reassessment of this pt.      Kavon Bowman, DO   Psychiatry  [facility]

## 2022-07-04 NOTE — PLAN OF CARE
"Aox4. ST noted to monitor. B/P monitored. C/o abdominal pain/"tightness". Pain moderately controlled c PRN IV Morphine. Scheduled Seroquel and Ambien given. Pt appears asleep most of night, snoring audible. CEC continued.  at bedside, safety measures maintained.       Problem: Adult Inpatient Plan of Care  Goal: Plan of Care Review  Outcome: Ongoing, Progressing  Goal: Patient-Specific Goal (Individualized)  Outcome: Ongoing, Progressing  Goal: Absence of Hospital-Acquired Illness or Injury  Outcome: Ongoing, Progressing  Goal: Optimal Comfort and Wellbeing  7/4/2022 0545 by Luisa Shay, RN  Outcome: Ongoing, Progressing  7/4/2022 0545 by Luisa Shay, RN  Outcome: Ongoing, Progressing  Goal: Readiness for Transition of Care  Outcome: Ongoing, Progressing     Problem: Bariatric Environmental Safety  Goal: Safety Maintained with Care  Outcome: Ongoing, Progressing     "

## 2022-07-04 NOTE — ASSESSMENT & PLAN NOTE
Severe acute pancreatitis secondary to alcohol abuse and severe hypertriglyceridemia.  Ultrasound negative for biliary obstruction.  Triglycerides much improved.  Continuous insulin infusion.  Mild increase in triglycerides today. Continue fenofibrate and statin therapy along with low fat diet.  Stable to transfer to the floor today.

## 2022-07-04 NOTE — PROGRESS NOTES
Pt received in room 345. Vital signs stable. Sitting up in chair, oriented and independent. States that he is extremely anxious following a conversation with his mother via text. CIWA 17, no PRN medications available at this time. Notified MD    7512: Received order from MD to give additional dose of diazepam. Pt now resting comfortably in bed with RR > 16. Safety precautions in place.

## 2022-07-04 NOTE — SUBJECTIVE & OBJECTIVE
Interval History: Slept well. Tolerating oral intake.    Review of Systems   Constitutional:  Negative for chills and fever.   Respiratory:  Negative for shortness of breath.    Cardiovascular:  Negative for chest pain.   Gastrointestinal:  Negative for abdominal distention, abdominal pain, constipation, diarrhea, nausea and vomiting.   Psychiatric/Behavioral:  Negative for agitation.      Objective:     Vital Signs (Most Recent):  Temp: 99.1 °F (37.3 °C) (07/04/22 0802)  Pulse: (!) 111 (07/04/22 1611)  Resp: 18 (07/04/22 1637)  BP: (!) 142/97 (07/04/22 1611)  SpO2: (!) 93 % (07/04/22 1611)   Vital Signs (24h Range):  Temp:  [99 °F (37.2 °C)-99.3 °F (37.4 °C)] 99.1 °F (37.3 °C)  Pulse:  [106-120] 111  Resp:  [13-36] 18  SpO2:  [90 %-97 %] 93 %  BP: (120-178)/() 142/97     Weight: (!) 164.2 kg (361 lb 15.9 oz)  Body mass index is 45.25 kg/m².  No intake or output data in the 24 hours ending 07/04/22 1822     Physical Exam  Constitutional:       Appearance: He is obese. He is not ill-appearing.   Cardiovascular:      Rate and Rhythm: Normal rate and regular rhythm.      Heart sounds: Normal heart sounds. No murmur heard.    No friction rub. No gallop.   Pulmonary:      Effort: Pulmonary effort is normal. No respiratory distress.      Breath sounds: Normal breath sounds. No wheezing or rales.   Abdominal:      General: Bowel sounds are decreased. There is no distension.      Palpations: Abdomen is soft.   Musculoskeletal:         General: No tenderness. Normal range of motion.   Skin:     General: Skin is warm and dry.      Coloration: Skin is not pale.      Findings: No erythema or rash.   Neurological:      Mental Status: He is alert and oriented to person, place, and time.       Significant Labs: All pertinent labs within the past 24 hours have been reviewed.    Significant Imaging: I have reviewed all pertinent imaging results/findings within the past 24 hours.

## 2022-07-04 NOTE — ASSESSMENT & PLAN NOTE
Patient reports relief with zolpidem along with home dose of quetiapine.  Will continue for now.  Long term zolpidem not advised.

## 2022-07-05 LAB
ALBUMIN SERPL BCP-MCNC: 2.2 G/DL (ref 3.5–5.2)
ALP SERPL-CCNC: 206 U/L (ref 55–135)
ALT SERPL W/O P-5'-P-CCNC: 83 U/L (ref 10–44)
ANION GAP SERPL CALC-SCNC: 10 MMOL/L (ref 8–16)
ANISOCYTOSIS BLD QL SMEAR: SLIGHT
AST SERPL-CCNC: 163 U/L (ref 10–40)
BASOPHILS # BLD AUTO: ABNORMAL K/UL (ref 0–0.2)
BASOPHILS NFR BLD: 1 % (ref 0–1.9)
BILIRUB SERPL-MCNC: 1.1 MG/DL (ref 0.1–1)
BUN SERPL-MCNC: 4 MG/DL (ref 6–20)
CALCIUM SERPL-MCNC: 9.2 MG/DL (ref 8.7–10.5)
CHLORIDE SERPL-SCNC: 100 MMOL/L (ref 95–110)
CO2 SERPL-SCNC: 29 MMOL/L (ref 23–29)
CREAT SERPL-MCNC: 1.2 MG/DL (ref 0.5–1.4)
DIFFERENTIAL METHOD: ABNORMAL
EOSINOPHIL # BLD AUTO: ABNORMAL K/UL (ref 0–0.5)
EOSINOPHIL NFR BLD: 1 % (ref 0–8)
ERYTHROCYTE [DISTWIDTH] IN BLOOD BY AUTOMATED COUNT: 17.6 % (ref 11.5–14.5)
EST. GFR  (AFRICAN AMERICAN): >60 ML/MIN/1.73 M^2
EST. GFR  (NON AFRICAN AMERICAN): >60 ML/MIN/1.73 M^2
GIANT PLATELETS BLD QL SMEAR: PRESENT
GLUCOSE SERPL-MCNC: 107 MG/DL (ref 70–110)
HCT VFR BLD AUTO: 33.6 % (ref 40–54)
HGB BLD-MCNC: 11 G/DL (ref 14–18)
HYPOCHROMIA BLD QL SMEAR: ABNORMAL
IMM GRANULOCYTES # BLD AUTO: ABNORMAL K/UL (ref 0–0.04)
IMM GRANULOCYTES NFR BLD AUTO: ABNORMAL % (ref 0–0.5)
LYMPHOCYTES # BLD AUTO: ABNORMAL K/UL (ref 1–4.8)
LYMPHOCYTES NFR BLD: 31 % (ref 18–48)
MCH RBC QN AUTO: 29.8 PG (ref 27–31)
MCHC RBC AUTO-ENTMCNC: 32.7 G/DL (ref 32–36)
MCV RBC AUTO: 91 FL (ref 82–98)
METAMYELOCYTES NFR BLD MANUAL: 5 %
MONOCYTES # BLD AUTO: ABNORMAL K/UL (ref 0.3–1)
MONOCYTES NFR BLD: 11 % (ref 4–15)
MYELOCYTES NFR BLD MANUAL: 2 %
NEUTROPHILS NFR BLD: 43 % (ref 38–73)
NEUTS BAND NFR BLD MANUAL: 6 %
NRBC BLD-RTO: 1 /100 WBC
PLATELET # BLD AUTO: 297 K/UL (ref 150–450)
PLATELET BLD QL SMEAR: ABNORMAL
PMV BLD AUTO: 10.2 FL (ref 9.2–12.9)
POIKILOCYTOSIS BLD QL SMEAR: SLIGHT
POLYCHROMASIA BLD QL SMEAR: ABNORMAL
POTASSIUM SERPL-SCNC: 4.2 MMOL/L (ref 3.5–5.1)
PROT SERPL-MCNC: 6.9 G/DL (ref 6–8.4)
RBC # BLD AUTO: 3.69 M/UL (ref 4.6–6.2)
SODIUM SERPL-SCNC: 139 MMOL/L (ref 136–145)
TARGETS BLD QL SMEAR: ABNORMAL
TOXIC GRANULES BLD QL SMEAR: PRESENT
TRIGL SERPL-MCNC: 550 MG/DL (ref 30–150)
WBC # BLD AUTO: 7.41 K/UL (ref 3.9–12.7)
WBC TOXIC VACUOLES BLD QL SMEAR: PRESENT

## 2022-07-05 PROCEDURE — 25000242 PHARM REV CODE 250 ALT 637 W/ HCPCS: Performed by: HOSPITALIST

## 2022-07-05 PROCEDURE — 99233 SBSQ HOSP IP/OBS HIGH 50: CPT | Mod: ,,, | Performed by: INTERNAL MEDICINE

## 2022-07-05 PROCEDURE — 99233 PR SUBSEQUENT HOSPITAL CARE,LEVL III: ICD-10-PCS | Mod: ,,, | Performed by: INTERNAL MEDICINE

## 2022-07-05 PROCEDURE — C1751 CATH, INF, PER/CENT/MIDLINE: HCPCS

## 2022-07-05 PROCEDURE — 25000003 PHARM REV CODE 250: Performed by: NURSE PRACTITIONER

## 2022-07-05 PROCEDURE — 25000003 PHARM REV CODE 250: Performed by: HOSPITALIST

## 2022-07-05 PROCEDURE — 84478 ASSAY OF TRIGLYCERIDES: CPT | Performed by: INTERNAL MEDICINE

## 2022-07-05 PROCEDURE — 85007 BL SMEAR W/DIFF WBC COUNT: CPT | Performed by: STUDENT IN AN ORGANIZED HEALTH CARE EDUCATION/TRAINING PROGRAM

## 2022-07-05 PROCEDURE — 25000003 PHARM REV CODE 250: Performed by: STUDENT IN AN ORGANIZED HEALTH CARE EDUCATION/TRAINING PROGRAM

## 2022-07-05 PROCEDURE — 63600175 PHARM REV CODE 636 W HCPCS: Performed by: NURSE PRACTITIONER

## 2022-07-05 PROCEDURE — A4216 STERILE WATER/SALINE, 10 ML: HCPCS | Performed by: HOSPITALIST

## 2022-07-05 PROCEDURE — 80053 COMPREHEN METABOLIC PANEL: CPT | Performed by: STUDENT IN AN ORGANIZED HEALTH CARE EDUCATION/TRAINING PROGRAM

## 2022-07-05 PROCEDURE — 63600175 PHARM REV CODE 636 W HCPCS: Performed by: HOSPITALIST

## 2022-07-05 PROCEDURE — 85027 COMPLETE CBC AUTOMATED: CPT | Performed by: STUDENT IN AN ORGANIZED HEALTH CARE EDUCATION/TRAINING PROGRAM

## 2022-07-05 PROCEDURE — 11000001 HC ACUTE MED/SURG PRIVATE ROOM

## 2022-07-05 RX ADMIN — SODIUM CHLORIDE, PRESERVATIVE FREE 10 ML: 5 INJECTION INTRAVENOUS at 12:07

## 2022-07-05 RX ADMIN — Medication 650 MG: at 12:07

## 2022-07-05 RX ADMIN — MORPHINE SULFATE 2 MG: 2 INJECTION, SOLUTION INTRAMUSCULAR; INTRAVENOUS at 12:07

## 2022-07-05 RX ADMIN — PANTOPRAZOLE SODIUM 40 MG: 40 TABLET, DELAYED RELEASE ORAL at 08:07

## 2022-07-05 RX ADMIN — ONDANSETRON 4 MG: 2 INJECTION INTRAMUSCULAR; INTRAVENOUS at 05:07

## 2022-07-05 RX ADMIN — HYDROMORPHONE HYDROCHLORIDE 1 MG: 1 INJECTION, SOLUTION INTRAMUSCULAR; INTRAVENOUS; SUBCUTANEOUS at 09:07

## 2022-07-05 RX ADMIN — MUPIROCIN: 20 OINTMENT TOPICAL at 08:07

## 2022-07-05 RX ADMIN — SODIUM CHLORIDE, PRESERVATIVE FREE 10 ML: 5 INJECTION INTRAVENOUS at 05:07

## 2022-07-05 RX ADMIN — FENOFIBRATE 160 MG: 160 TABLET ORAL at 08:07

## 2022-07-05 RX ADMIN — ATORVASTATIN CALCIUM 80 MG: 20 TABLET, FILM COATED ORAL at 08:07

## 2022-07-05 RX ADMIN — POLYETHYLENE GLYCOL 3350 17 G: 17 POWDER, FOR SOLUTION ORAL at 08:07

## 2022-07-05 RX ADMIN — THIAMINE HCL TAB 100 MG 100 MG: 100 TAB at 08:07

## 2022-07-05 RX ADMIN — QUETIAPINE FUMARATE 300 MG: 200 TABLET ORAL at 08:07

## 2022-07-05 RX ADMIN — MORPHINE SULFATE 2 MG: 2 INJECTION, SOLUTION INTRAMUSCULAR; INTRAVENOUS at 10:07

## 2022-07-05 RX ADMIN — ENOXAPARIN SODIUM 40 MG: 40 INJECTION SUBCUTANEOUS at 05:07

## 2022-07-05 RX ADMIN — Medication 650 MG: at 10:07

## 2022-07-05 RX ADMIN — MORPHINE SULFATE 2 MG: 2 INJECTION, SOLUTION INTRAMUSCULAR; INTRAVENOUS at 05:07

## 2022-07-05 RX ADMIN — ZOLPIDEM TARTRATE 10 MG: 5 TABLET ORAL at 08:07

## 2022-07-05 RX ADMIN — FOLIC ACID 1 MG: 1 TABLET ORAL at 08:07

## 2022-07-05 RX ADMIN — PROMETHAZINE HYDROCHLORIDE 25 MG: 25 INJECTION INTRAMUSCULAR; INTRAVENOUS at 08:07

## 2022-07-05 RX ADMIN — THERA TABS 1 TABLET: TAB at 08:07

## 2022-07-05 NOTE — SUBJECTIVE & OBJECTIVE
Interval History: Patient is awake and alert this morning.  Still with moderate abdominal pain, worse with eating.  No chest pain or SOB.    Review of Systems   Constitutional:  Negative for chills and fever.   HENT:  Negative for ear pain.    Eyes:  Negative for pain.   Respiratory:  Negative for shortness of breath.    Cardiovascular:  Negative for chest pain.   Gastrointestinal:  Positive for abdominal pain and nausea. Negative for abdominal distention, constipation, diarrhea and vomiting.   Genitourinary:  Negative for difficulty urinating and dysuria.   Musculoskeletal:  Negative for arthralgias, back pain and gait problem.   Skin:  Negative for rash.   Neurological:  Negative for dizziness and facial asymmetry.   Psychiatric/Behavioral:  Negative for agitation, behavioral problems and confusion.      Objective:     Vital Signs (Most Recent):  Temp: 97.6 °F (36.4 °C) (07/05/22 1134)  Pulse: 105 (07/05/22 1134)  Resp: 20 (07/05/22 1134)  BP: 138/71 (07/05/22 1134)  SpO2: 95 % (07/05/22 1134)   Vital Signs (24h Range):  Temp:  [97.6 °F (36.4 °C)-99.2 °F (37.3 °C)] 97.6 °F (36.4 °C)  Pulse:  [105-111] 105  Resp:  [16-20] 20  SpO2:  [92 %-95 %] 95 %  BP: (138-160)/(71-97) 138/71     Weight: (!) 164.2 kg (361 lb 15.9 oz)  Body mass index is 45.25 kg/m².  No intake or output data in the 24 hours ending 07/05/22 1357     Physical Exam  Constitutional:       General: He is not in acute distress.     Appearance: He is obese. He is not ill-appearing.   HENT:      Head: Normocephalic and atraumatic.      Right Ear: External ear normal.      Left Ear: External ear normal.      Nose: Nose normal.      Mouth/Throat:      Mouth: Mucous membranes are moist.      Pharynx: Oropharynx is clear.   Eyes:      General: No scleral icterus.     Pupils: Pupils are equal, round, and reactive to light.   Cardiovascular:      Rate and Rhythm: Normal rate and regular rhythm.      Heart sounds: Normal heart sounds. No murmur heard.    No  friction rub. No gallop.   Pulmonary:      Effort: Pulmonary effort is normal. No respiratory distress.      Breath sounds: Normal breath sounds. No wheezing or rales.   Abdominal:      General: Bowel sounds are decreased. There is no distension.      Palpations: Abdomen is soft.      Tenderness: There is no abdominal tenderness. There is no guarding.   Musculoskeletal:         General: No swelling or tenderness. Normal range of motion.      Cervical back: Normal range of motion and neck supple.   Skin:     General: Skin is warm and dry.      Coloration: Skin is not pale.      Findings: No erythema or rash.   Neurological:      General: No focal deficit present.      Mental Status: He is alert and oriented to person, place, and time. Mental status is at baseline.   Psychiatric:         Mood and Affect: Mood normal.         Behavior: Behavior normal.       Significant Labs: All pertinent labs within the past 24 hours have been reviewed.    Significant Imaging: I have reviewed all pertinent imaging results/findings within the past 24 hours.

## 2022-07-05 NOTE — ASSESSMENT & PLAN NOTE
Patient reported gaining 50 lbs over the past 6 months. He has been having severe insomnia for the past 30 days due to multiple stressors in his life. He was taking seroquel but it has not been working. He drinks immeasurable amounts of non-specific alcoholic beverages daily.  CIWA-Ar low this morning.  He did get a total of 40mg IV Valium yesterday.    Plan:  Continue with CIWA q4 with prn IV Valium  Continue with MVI/Thiamine/Folic Acid

## 2022-07-05 NOTE — PLAN OF CARE
CM met with patient. Plan is to return home. Will need transportation home.   07/05/22 1445   Discharge Reassessment   Assessment Type Discharge Planning Reassessment   Did the patient's condition or plan change since previous assessment? No   Discharge Plan discussed with: Patient   DME Needed Upon Discharge  none   Discharge Barriers Identified Mental illness   Why the patient remains in the hospital Requires continued medical care   Post-Acute Status   Discharge Delays None known at this time

## 2022-07-05 NOTE — PLAN OF CARE
Pt inquiring about transitioning to oral pain and/or anti-anxiety medication. Notified MD.    Problem: Adult Inpatient Plan of Care  Goal: Plan of Care Review  Outcome: Ongoing, Progressing  Goal: Patient-Specific Goal (Individualized)  Outcome: Ongoing, Progressing  Goal: Absence of Hospital-Acquired Illness or Injury  Outcome: Ongoing, Progressing  Goal: Readiness for Transition of Care  Outcome: Ongoing, Progressing

## 2022-07-05 NOTE — ASSESSMENT & PLAN NOTE
Patient reports relief with Zolpidem along with home dose of Quetiapine.   Long term Zolpidem not advised.  -Will continue for now.

## 2022-07-05 NOTE — ASSESSMENT & PLAN NOTE
"Patient presented with abdominal pain, nausea, vomiting, and reported alcohol withdrawal.  CT of A/P with retroperitoneal edema felt to represent acute pancreatitis.  Ultrasound negative for biliary obstruction.   Lipase on admission elevated at 281, TBili 2.4, Alk Phos 369, AST/DTE288/113, TGs elevated at 1697->5680.  Treated initially with IV Insulin infusion with improved TGs.  Pain improving, but still with post-prandial pain.  Transferred out of ICU on 7/4/2022.  .      Seen by GI - "37 y.o. man with hx of bipolar disorder and alcohol abuse admitted with severe pancreatitis 2/2 hypertriglyceridemia and alcohol.  His symptoms are improving. CT unremarkable.TGs trending down- now less than 500.  Recommendations: 1. May wean insulin/ D10. 2. Continue fenofibrate and atorvastatin. 3. Continue low fat diet 4. Weight loss 5. ETOH cessation 6. MVI, thiamine and folate 7. Outpatient endocrine f/u 8. Establish therapist along with psychiatry".     Plan:  Continue with pain control and anti-emetics  Continue with Low Fat diet  Continue with Fenofibate and Atorvastatin    "

## 2022-07-05 NOTE — PROGRESS NOTES
"[facility]  Progress Note    Patient Name: Lola Wetzel  MRN: [id number]  Patient Class: IP- Inpatient   Admission Date: 6/28/2022  Length of Stay: 7 days  Attending Physician: Louis Noguera MD  Primary Care Provider: Janet Alaniz DO        Subjective:     Principal Problem:Acute pancreatitis      HPI:  Per Ileana Uribe, DNP:    "Mr Lola is a 37 yr old male with PMH that includes insomnia and bipolar 1 disorder. He came to the ED with abdominal pain, nausea, vomiting, and reported alcohol withdrawals. Pt also reports gaining 50 lbs over the past 6 months. He has been having severe insomnia for the past 30 days due to multiple stressors in his life. He was taking seroquel but it has not been working. He drinks immeasurable amounts of non-specific alcoholic beverages daily. He has not been able to keep anything down for the past two days. Pain is located in his left upper quadrant and is worse with palpation. The pain is described as cramping and started two days ago. Pt has had decreased intake and output over the past few days. He reports episodes of chills and sweats at home. Pt has allergy to ativan but is able to take diazepam. He has been admitted to hospital medicine and placed in the ICU."      Overview/Hospital Course:  Patient is a 37-year-old man with evidence of severe acute pancreatitis secondary to severe triglyceridemia and alcohol abuse. Patient presented with fever, leukocytosis, and severe lactic acidosis.  Severe inflammatory response likely due to acute pancreatitis without convincing evidence of a source of infection at this time.  Patient treated aggressive isotonic intravenous fluid resuscitation.  Lactic acidosis improving with volume resuscitation.       Patient with significant agitation overnight likely related to confusion related to metabolic encephalopathy and also alcohol withdrawal.  Patient started continuous dexmedetomidine " with improvement in mental status/agitation. Psychiatry service consulted and recommended PEC due to grave debility related to substance abuse and concern for underlying mental illness.       Patient also started on continuous infusion to treat severe hypertriglyceridemia.   Patient abdominal pain improving and now tolerating low fat diet.      Interval History: Patient is awake and alert this morning.  Still with moderate abdominal pain, worse with eating.  No chest pain or SOB.    Review of Systems   Constitutional:  Negative for chills and fever.   HENT:  Negative for ear pain.    Eyes:  Negative for pain.   Respiratory:  Negative for shortness of breath.    Cardiovascular:  Negative for chest pain.   Gastrointestinal:  Positive for abdominal pain and nausea. Negative for abdominal distention, constipation, diarrhea and vomiting.   Genitourinary:  Negative for difficulty urinating and dysuria.   Musculoskeletal:  Negative for arthralgias, back pain and gait problem.   Skin:  Negative for rash.   Neurological:  Negative for dizziness and facial asymmetry.   Psychiatric/Behavioral:  Negative for agitation, behavioral problems and confusion.      Objective:     Vital Signs (Most Recent):  Temp: 97.6 °F (36.4 °C) (07/05/22 1134)  Pulse: 105 (07/05/22 1134)  Resp: 20 (07/05/22 1134)  BP: 138/71 (07/05/22 1134)  SpO2: 95 % (07/05/22 1134)   Vital Signs (24h Range):  Temp:  [97.6 °F (36.4 °C)-99.2 °F (37.3 °C)] 97.6 °F (36.4 °C)  Pulse:  [105-111] 105  Resp:  [16-20] 20  SpO2:  [92 %-95 %] 95 %  BP: (138-160)/(71-97) 138/71     Weight: (!) 164.2 kg (361 lb 15.9 oz)  Body mass index is 45.25 kg/m².  No intake or output data in the 24 hours ending 07/05/22 1357     Physical Exam  Constitutional:       General: He is not in acute distress.     Appearance: He is obese. He is not ill-appearing.   HENT:      Head: Normocephalic and atraumatic.      Right Ear: External ear normal.      Left Ear: External ear normal.      Nose:  Nose normal.      Mouth/Throat:      Mouth: Mucous membranes are moist.      Pharynx: Oropharynx is clear.   Eyes:      General: No scleral icterus.     Pupils: Pupils are equal, round, and reactive to light.   Cardiovascular:      Rate and Rhythm: Normal rate and regular rhythm.      Heart sounds: Normal heart sounds. No murmur heard.    No friction rub. No gallop.   Pulmonary:      Effort: Pulmonary effort is normal. No respiratory distress.      Breath sounds: Normal breath sounds. No wheezing or rales.   Abdominal:      General: Bowel sounds are decreased. There is no distension.      Palpations: Abdomen is soft.      Tenderness: There is no abdominal tenderness. There is no guarding.   Musculoskeletal:         General: No swelling or tenderness. Normal range of motion.      Cervical back: Normal range of motion and neck supple.   Skin:     General: Skin is warm and dry.      Coloration: Skin is not pale.      Findings: No erythema or rash.   Neurological:      General: No focal deficit present.      Mental Status: He is alert and oriented to person, place, and time. Mental status is at baseline.   Psychiatric:         Mood and Affect: Mood normal.         Behavior: Behavior normal.       Significant Labs: All pertinent labs within the past 24 hours have been reviewed.    Significant Imaging: I have reviewed all pertinent imaging results/findings within the past 24 hours.      Assessment/Plan:      * Acute pancreatitis  Patient presented with abdominal pain, nausea, vomiting, and reported alcohol withdrawal.  CT of A/P with retroperitoneal edema felt to represent acute pancreatitis.  Ultrasound negative for biliary obstruction.   Lipase on admission elevated at 281, TBili 2.4, Alk Phos 369, AST/YHT403/113, TGs elevated at 1697->5680.  Treated initially with IV Insulin infusion with improved TGs.  Pain improving, but still with post-prandial pain.  Transferred out of ICU on 7/4/2022.  .      Seen by GI -  ""37 y.o. man with hx of bipolar disorder and alcohol abuse admitted with severe pancreatitis 2/2 hypertriglyceridemia and alcohol.  His symptoms are improving. CT unremarkable.TGs trending down- now less than 500.  Recommendations: 1. May wean insulin/ D10. 2. Continue fenofibrate and atorvastatin. 3. Continue low fat diet 4. Weight loss 5. ETOH cessation 6. MVI, thiamine and folate 7. Outpatient endocrine f/u 8. Establish therapist along with psychiatry".     Plan:  Continue with pain control and anti-emetics  Continue with Low Fat diet  Continue with Fenofibate and Atorvastatin      Alcohol dependence with withdrawal  Patient reported gaining 50 lbs over the past 6 months. He has been having severe insomnia for the past 30 days due to multiple stressors in his life. He was taking seroquel but it has not been working. He drinks immeasurable amounts of non-specific alcoholic beverages daily.  CIWA-Ar low this morning.  He did get a total of 40mg IV Valium yesterday.    Plan:  Continue with CIWA q4 with prn IV Valium  Continue with MVI/Thiamine/Folic Acid    Insomnia  Patient reports relief with Zolpidem along with home dose of Quetiapine.   Long term Zolpidem not advised.  -Will continue for now.     Acute metabolic encephalopathy  Secondary to severe inflammatory response from acute pancreatitis, alcohol abuse/withdrawal, and possibly underlying mental illness.  PEC/CEC on admission.  Agitation improved.  Weaned off Dexmedetomidine infusion.  Psychiatry re-evaluated 7/4/2022 recommended rescinding PEC.  PEC/CEC rescinded.    Hypertriglyceridemia  -See above  -Continue fenofibrate, statin therapy, and low fat diet.      VTE Risk Mitigation (From admission, onward)         Ordered     enoxaparin injection 40 mg  Daily         06/30/22 0846     IP VTE HIGH RISK PATIENT  Once         06/28/22 2003     Place sequential compression device  Until discontinued         06/28/22 2003                Discharge Planning   MATY:  "     Code Status: Full Code   Is the patient medically ready for discharge?:     Reason for patient still in hospital (select all that apply): Patient trending condition, Treatment and Consult recommendations                     Louis Noguera MD  Department of Hospital Medicine   [facility]

## 2022-07-05 NOTE — ASSESSMENT & PLAN NOTE
Secondary to severe inflammatory response from acute pancreatitis, alcohol abuse/withdrawal, and possibly underlying mental illness.  PEC/CEC on admission.  Agitation improved.  Weaned off Dexmedetomidine infusion.  Psychiatry re-evaluated 7/4/2022 recommended rescinding PEC.  PEC/CEC rescinded.

## 2022-07-06 LAB
ALBUMIN SERPL BCP-MCNC: 2.1 G/DL (ref 3.5–5.2)
ALP SERPL-CCNC: 200 U/L (ref 55–135)
ALT SERPL W/O P-5'-P-CCNC: 89 U/L (ref 10–44)
ANION GAP SERPL CALC-SCNC: 12 MMOL/L (ref 8–16)
ANISOCYTOSIS BLD QL SMEAR: SLIGHT
AST SERPL-CCNC: 199 U/L (ref 10–40)
BASOPHILS # BLD AUTO: ABNORMAL K/UL (ref 0–0.2)
BASOPHILS NFR BLD: 0 % (ref 0–1.9)
BILIRUB SERPL-MCNC: 1 MG/DL (ref 0.1–1)
BUN SERPL-MCNC: 5 MG/DL (ref 6–20)
CALCIUM SERPL-MCNC: 8.7 MG/DL (ref 8.7–10.5)
CHLORIDE SERPL-SCNC: 100 MMOL/L (ref 95–110)
CO2 SERPL-SCNC: 28 MMOL/L (ref 23–29)
CREAT SERPL-MCNC: 1.4 MG/DL (ref 0.5–1.4)
DIFFERENTIAL METHOD: ABNORMAL
EOSINOPHIL # BLD AUTO: ABNORMAL K/UL (ref 0–0.5)
EOSINOPHIL NFR BLD: 4 % (ref 0–8)
ERYTHROCYTE [DISTWIDTH] IN BLOOD BY AUTOMATED COUNT: 17.8 % (ref 11.5–14.5)
EST. GFR  (AFRICAN AMERICAN): >60 ML/MIN/1.73 M^2
EST. GFR  (NON AFRICAN AMERICAN): >60 ML/MIN/1.73 M^2
GLUCOSE SERPL-MCNC: 108 MG/DL (ref 70–110)
HCT VFR BLD AUTO: 32 % (ref 40–54)
HGB BLD-MCNC: 10.5 G/DL (ref 14–18)
HYPOCHROMIA BLD QL SMEAR: ABNORMAL
IMM GRANULOCYTES # BLD AUTO: ABNORMAL K/UL (ref 0–0.04)
IMM GRANULOCYTES NFR BLD AUTO: ABNORMAL % (ref 0–0.5)
LYMPHOCYTES # BLD AUTO: ABNORMAL K/UL (ref 1–4.8)
LYMPHOCYTES NFR BLD: 21 % (ref 18–48)
MCH RBC QN AUTO: 30.3 PG (ref 27–31)
MCHC RBC AUTO-ENTMCNC: 32.8 G/DL (ref 32–36)
MCV RBC AUTO: 92 FL (ref 82–98)
MONOCYTES # BLD AUTO: ABNORMAL K/UL (ref 0.3–1)
MONOCYTES NFR BLD: 16 % (ref 4–15)
NEUTROPHILS NFR BLD: 57 % (ref 38–73)
NEUTS BAND NFR BLD MANUAL: 2 %
NRBC BLD-RTO: 1 /100 WBC
PLATELET # BLD AUTO: 320 K/UL (ref 150–450)
PLATELET BLD QL SMEAR: ABNORMAL
PMV BLD AUTO: 10.1 FL (ref 9.2–12.9)
POLYCHROMASIA BLD QL SMEAR: ABNORMAL
POTASSIUM SERPL-SCNC: 4.1 MMOL/L (ref 3.5–5.1)
PROT SERPL-MCNC: 6.6 G/DL (ref 6–8.4)
RBC # BLD AUTO: 3.47 M/UL (ref 4.6–6.2)
SODIUM SERPL-SCNC: 140 MMOL/L (ref 136–145)
TRIGL SERPL-MCNC: 440 MG/DL (ref 30–150)
WBC # BLD AUTO: 6.59 K/UL (ref 3.9–12.7)

## 2022-07-06 PROCEDURE — 25000003 PHARM REV CODE 250: Performed by: HOSPITALIST

## 2022-07-06 PROCEDURE — A4216 STERILE WATER/SALINE, 10 ML: HCPCS | Performed by: HOSPITALIST

## 2022-07-06 PROCEDURE — 25000242 PHARM REV CODE 250 ALT 637 W/ HCPCS: Performed by: HOSPITALIST

## 2022-07-06 PROCEDURE — 63600175 PHARM REV CODE 636 W HCPCS: Performed by: HOSPITALIST

## 2022-07-06 PROCEDURE — 99233 SBSQ HOSP IP/OBS HIGH 50: CPT | Mod: 95,,, | Performed by: PSYCHIATRY & NEUROLOGY

## 2022-07-06 PROCEDURE — 99233 SBSQ HOSP IP/OBS HIGH 50: CPT | Mod: ,,, | Performed by: INTERNAL MEDICINE

## 2022-07-06 PROCEDURE — 85007 BL SMEAR W/DIFF WBC COUNT: CPT | Performed by: STUDENT IN AN ORGANIZED HEALTH CARE EDUCATION/TRAINING PROGRAM

## 2022-07-06 PROCEDURE — 25000003 PHARM REV CODE 250: Performed by: NURSE PRACTITIONER

## 2022-07-06 PROCEDURE — 84478 ASSAY OF TRIGLYCERIDES: CPT | Performed by: INTERNAL MEDICINE

## 2022-07-06 PROCEDURE — 80053 COMPREHEN METABOLIC PANEL: CPT | Performed by: STUDENT IN AN ORGANIZED HEALTH CARE EDUCATION/TRAINING PROGRAM

## 2022-07-06 PROCEDURE — C1751 CATH, INF, PER/CENT/MIDLINE: HCPCS

## 2022-07-06 PROCEDURE — 99233 PR SUBSEQUENT HOSPITAL CARE,LEVL III: ICD-10-PCS | Mod: ,,, | Performed by: INTERNAL MEDICINE

## 2022-07-06 PROCEDURE — 25000003 PHARM REV CODE 250: Performed by: INTERNAL MEDICINE

## 2022-07-06 PROCEDURE — 63600175 PHARM REV CODE 636 W HCPCS: Performed by: NURSE PRACTITIONER

## 2022-07-06 PROCEDURE — 11000001 HC ACUTE MED/SURG PRIVATE ROOM

## 2022-07-06 PROCEDURE — 25000003 PHARM REV CODE 250: Performed by: STUDENT IN AN ORGANIZED HEALTH CARE EDUCATION/TRAINING PROGRAM

## 2022-07-06 PROCEDURE — 99233 PR SUBSEQUENT HOSPITAL CARE,LEVL III: ICD-10-PCS | Mod: 95,,, | Performed by: PSYCHIATRY & NEUROLOGY

## 2022-07-06 PROCEDURE — 85027 COMPLETE CBC AUTOMATED: CPT | Performed by: STUDENT IN AN ORGANIZED HEALTH CARE EDUCATION/TRAINING PROGRAM

## 2022-07-06 RX ORDER — OXYCODONE HYDROCHLORIDE 5 MG/1
5 TABLET ORAL EVERY 4 HOURS PRN
Status: DISCONTINUED | OUTPATIENT
Start: 2022-07-06 | End: 2022-07-07 | Stop reason: HOSPADM

## 2022-07-06 RX ADMIN — Medication 650 MG: at 07:07

## 2022-07-06 RX ADMIN — ENOXAPARIN SODIUM 40 MG: 40 INJECTION SUBCUTANEOUS at 04:07

## 2022-07-06 RX ADMIN — OXYCODONE 5 MG: 5 TABLET ORAL at 10:07

## 2022-07-06 RX ADMIN — THERA TABS 1 TABLET: TAB at 08:07

## 2022-07-06 RX ADMIN — OXYCODONE 5 MG: 5 TABLET ORAL at 04:07

## 2022-07-06 RX ADMIN — FENOFIBRATE 160 MG: 160 TABLET ORAL at 08:07

## 2022-07-06 RX ADMIN — ATORVASTATIN CALCIUM 80 MG: 20 TABLET, FILM COATED ORAL at 08:07

## 2022-07-06 RX ADMIN — HYDROMORPHONE HYDROCHLORIDE 1 MG: 1 INJECTION, SOLUTION INTRAMUSCULAR; INTRAVENOUS; SUBCUTANEOUS at 09:07

## 2022-07-06 RX ADMIN — MORPHINE SULFATE 2 MG: 2 INJECTION, SOLUTION INTRAMUSCULAR; INTRAVENOUS at 04:07

## 2022-07-06 RX ADMIN — MUPIROCIN: 20 OINTMENT TOPICAL at 08:07

## 2022-07-06 RX ADMIN — SODIUM CHLORIDE, PRESERVATIVE FREE 10 ML: 5 INJECTION INTRAVENOUS at 05:07

## 2022-07-06 RX ADMIN — PANTOPRAZOLE SODIUM 40 MG: 40 TABLET, DELAYED RELEASE ORAL at 08:07

## 2022-07-06 RX ADMIN — FOLIC ACID 1 MG: 1 TABLET ORAL at 08:07

## 2022-07-06 RX ADMIN — HYDROMORPHONE HYDROCHLORIDE 1 MG: 1 INJECTION, SOLUTION INTRAMUSCULAR; INTRAVENOUS; SUBCUTANEOUS at 12:07

## 2022-07-06 RX ADMIN — HYDROMORPHONE HYDROCHLORIDE 1 MG: 1 INJECTION, SOLUTION INTRAMUSCULAR; INTRAVENOUS; SUBCUTANEOUS at 05:07

## 2022-07-06 RX ADMIN — THIAMINE HCL TAB 100 MG 100 MG: 100 TAB at 08:07

## 2022-07-06 RX ADMIN — QUETIAPINE FUMARATE 300 MG: 200 TABLET ORAL at 09:07

## 2022-07-06 RX ADMIN — POLYETHYLENE GLYCOL 3350 17 G: 17 POWDER, FOR SOLUTION ORAL at 08:07

## 2022-07-06 RX ADMIN — OXYCODONE 5 MG: 5 TABLET ORAL at 08:07

## 2022-07-06 RX ADMIN — ONDANSETRON 4 MG: 2 INJECTION INTRAMUSCULAR; INTRAVENOUS at 08:07

## 2022-07-06 RX ADMIN — HYDROMORPHONE HYDROCHLORIDE 1 MG: 1 INJECTION, SOLUTION INTRAMUSCULAR; INTRAVENOUS; SUBCUTANEOUS at 08:07

## 2022-07-06 RX ADMIN — ZOLPIDEM TARTRATE 10 MG: 5 TABLET ORAL at 09:07

## 2022-07-06 NOTE — PROGRESS NOTES
"[facility] Medicine  Progress Note    Patient Name: Lola Wetzel  MRN: [id number]  Patient Class: IP- Inpatient   Admission Date: 6/28/2022  Length of Stay: 8 days  Attending Physician: Louis Noguera MD  Primary Care Provider: Janet Alaniz DO        Subjective:     Principal Problem:Acute pancreatitis      HPI:  Per Ileana Uribe, DNP:    "Mr Lola is a 37 yr old male with PMH that includes insomnia and bipolar 1 disorder. He came to the ED with abdominal pain, nausea, vomiting, and reported alcohol withdrawals. Pt also reports gaining 50 lbs over the past 6 months. He has been having severe insomnia for the past 30 days due to multiple stressors in his life. He was taking seroquel but it has not been working. He drinks immeasurable amounts of non-specific alcoholic beverages daily. He has not been able to keep anything down for the past two days. Pain is located in his left upper quadrant and is worse with palpation. The pain is described as cramping and started two days ago. Pt has had decreased intake and output over the past few days. He reports episodes of chills and sweats at home. Pt has allergy to ativan but is able to take diazepam. He has been admitted to hospital medicine and placed in the ICU."      Overview/Hospital Course:  Patient is a 37-year-old man with evidence of severe acute pancreatitis secondary to severe triglyceridemia and alcohol abuse. Patient presented with fever, leukocytosis, and severe lactic acidosis.  Severe inflammatory response likely due to acute pancreatitis without convincing evidence of a source of infection at this time.  Patient treated aggressive isotonic intravenous fluid resuscitation.  Lactic acidosis improving with volume resuscitation.       Patient with significant agitation overnight likely related to confusion related to metabolic encephalopathy and also alcohol withdrawal.  Patient started continuous dexmedetomidine " with improvement in mental status/agitation. Psychiatry service consulted and recommended PEC due to grave debility related to substance abuse and concern for underlying mental illness.       Patient also started on continuous infusion to treat severe hypertriglyceridemia.   Patient abdominal pain improving and now tolerating low fat diet.      Interval History: Patient is awake and alert this morning.  Still with moderate abdominal pain, worse with eating - a little better from yesterday  No chest pain or SOB.  No significant alcohol withdrawal.    Review of Systems   Constitutional:  Negative for chills and fever.   HENT:  Negative for ear pain.    Eyes:  Negative for pain.   Respiratory:  Negative for shortness of breath.    Cardiovascular:  Negative for chest pain.   Gastrointestinal:  Positive for abdominal pain and nausea. Negative for abdominal distention, constipation, diarrhea and vomiting.   Genitourinary:  Negative for difficulty urinating and dysuria.   Musculoskeletal:  Negative for arthralgias, back pain and gait problem.   Skin:  Negative for rash.   Neurological:  Negative for dizziness and facial asymmetry.   Psychiatric/Behavioral:  Negative for agitation, behavioral problems and confusion.      Objective:     Vital Signs (Most Recent):  Temp: 99 °F (37.2 °C) (07/06/22 0818)  Pulse: 104 (07/06/22 0818)  Resp: 20 (07/06/22 1034)  BP: 126/73 (07/06/22 0818)  SpO2: (!) 94 % (07/06/22 0818)   Vital Signs (24h Range):  Temp:  [97.6 °F (36.4 °C)-99 °F (37.2 °C)] 99 °F (37.2 °C)  Pulse:  [103-106] 104  Resp:  [17-20] 20  SpO2:  [93 %-95 %] 94 %  BP: (126-145)/(71-89) 126/73     Weight: (!) 164.2 kg (361 lb 15.9 oz)  Body mass index is 45.25 kg/m².    Intake/Output Summary (Last 24 hours) at 7/6/2022 1114  Last data filed at 7/6/2022 0000  Gross per 24 hour   Intake 480 ml   Output --   Net 480 ml        Physical Exam  Constitutional:       General: He is not in acute distress.     Appearance: He is obese.  He is not ill-appearing.   HENT:      Head: Normocephalic and atraumatic.      Right Ear: External ear normal.      Left Ear: External ear normal.      Nose: Nose normal.      Mouth/Throat:      Mouth: Mucous membranes are moist.      Pharynx: Oropharynx is clear.   Eyes:      General: No scleral icterus.     Pupils: Pupils are equal, round, and reactive to light.   Cardiovascular:      Rate and Rhythm: Normal rate and regular rhythm.      Heart sounds: Normal heart sounds. No murmur heard.    No friction rub. No gallop.   Pulmonary:      Effort: Pulmonary effort is normal. No respiratory distress.      Breath sounds: Normal breath sounds. No wheezing or rales.   Abdominal:      General: Bowel sounds are decreased. There is no distension.      Palpations: Abdomen is soft.      Tenderness: There is no abdominal tenderness. There is no guarding.   Musculoskeletal:         General: No swelling or tenderness. Normal range of motion.      Cervical back: Normal range of motion and neck supple.   Skin:     General: Skin is warm and dry.      Coloration: Skin is not pale.      Findings: No erythema or rash.   Neurological:      General: No focal deficit present.      Mental Status: He is alert and oriented to person, place, and time. Mental status is at baseline.   Psychiatric:         Mood and Affect: Mood normal.         Behavior: Behavior normal.       Significant Labs: All pertinent labs within the past 24 hours have been reviewed.    Significant Imaging: I have reviewed all pertinent imaging results/findings within the past 24 hours.      Assessment/Plan:      * Acute pancreatitis  Patient presented with abdominal pain, nausea, vomiting, and reported alcohol withdrawal.  CT of A/P with retroperitoneal edema felt to represent acute pancreatitis.  Ultrasound negative for biliary obstruction.   Lipase on admission elevated at 281, TBili 2.4, Alk Phos 369, AST/TCR382/113, TGs elevated at 1697->5680.  Treated initially with  "IV Insulin infusion with improved TGs - stopped on 7/3/2022.  Transferred out of ICU on 7/4/2022.  Pain improving, but still with post-prandial pain.   yesterday.      Seen by GI - "37 y.o. man with hx of bipolar disorder and alcohol abuse admitted with severe pancreatitis 2/2 hypertriglyceridemia and alcohol.  His symptoms are improving. CT unremarkable.TGs trending down- now less than 500.  Recommendations: 1. May wean insulin/ D10. 2. Continue fenofibrate and atorvastatin. 3. Continue low fat diet 4. Weight loss 5. ETOH cessation 6. MVI, thiamine and folate 7. Outpatient endocrine f/u 8. Establish therapist along with psychiatry".     Plan:  Continue with pain control and anti-emetics - transition to oral Oxycodone  Continue with Low Fat diet  Continue with Fenofibate and Atorvastatin        Alcohol dependence with withdrawal  Patient reported gaining 50 lbs over the past 6 months. He has been having severe insomnia for the past 30 days due to multiple stressors in his life. He was taking seroquel but it has not been working. He drinks immeasurable amounts of non-specific alcoholic beverages daily.  CIWA-Ar low this morning.  He did not require any prn Valium yesterday.    Plan:  Continue with CIWA q4 with prn IV Valium  Continue with MVI/Thiamine/Folic Acid    Insomnia  Patient reports relief with Zolpidem along with home dose of Quetiapine.   Long term Zolpidem not advised.  -Will continue for now.     Acute metabolic encephalopathy  Secondary to severe inflammatory response from acute pancreatitis, alcohol abuse/withdrawal, and possibly underlying mental illness.  PEC/CEC on admission.  Agitation improved.  Weaned off Dexmedetomidine infusion.  Psychiatry re-evaluated 7/4/2022 recommended rescinding PEC.  PEC/CEC rescinded.    Hypertriglyceridemia  -See above  -Continue fenofibrate, statin therapy, and low fat diet.      VTE Risk Mitigation (From admission, onward)         Ordered     enoxaparin " injection 40 mg  Daily         06/30/22 0846     IP VTE HIGH RISK PATIENT  Once         06/28/22 2003     Place sequential compression device  Until discontinued         06/28/22 2003                Discharge Planning   MATY:      Code Status: Full Code   Is the patient medically ready for discharge?:     Reason for patient still in hospital (select all that apply): Patient trending condition, Treatment and Consult recommendations      Discharge Delays: None known at this time              Louis Noguera MD  Department of Hospital Medicine   [facility])

## 2022-07-06 NOTE — ASSESSMENT & PLAN NOTE
"Patient presented with abdominal pain, nausea, vomiting, and reported alcohol withdrawal.  CT of A/P with retroperitoneal edema felt to represent acute pancreatitis.  Ultrasound negative for biliary obstruction.   Lipase on admission elevated at 281, TBili 2.4, Alk Phos 369, AST/YXP279/113, TGs elevated at 1697->5680.  Treated initially with IV Insulin infusion with improved TGs - stopped on 7/3/2022.  Transferred out of ICU on 7/4/2022.  Pain improving, but still with post-prandial pain.   yesterday.      Seen by GI - "37 y.o. man with hx of bipolar disorder and alcohol abuse admitted with severe pancreatitis 2/2 hypertriglyceridemia and alcohol.  His symptoms are improving. CT unremarkable.TGs trending down- now less than 500.  Recommendations: 1. May wean insulin/ D10. 2. Continue fenofibrate and atorvastatin. 3. Continue low fat diet 4. Weight loss 5. ETOH cessation 6. MVI, thiamine and folate 7. Outpatient endocrine f/u 8. Establish therapist along with psychiatry".     Plan:  Continue with pain control and anti-emetics - transition to oral Oxycodone  Continue with Low Fat diet  Continue with Fenofibate and Atorvastatin      "

## 2022-07-06 NOTE — CONSULTS
"  Consults  Consult Start Time: 07/06/2022 14:15 CDT  Consult End Time: 07/06/2022 15:40 CDT          7/6/2022 2:22 PM   Lola Wetzel   1985   [id number]     Consult requested by: Dr. Louis Noguera  Consult begun at: 2:15 pm       Inpatient Tele-Psychiatry Consult f/u     Please see telepsych consult notes from 06/29/22 and 07/04/22.    From hospital medicine note from today:  "HPI:  Per Ileana Uribe, DNP:  "Mr Davila is a 37 yr old male with PMH that includes insomnia and bipolar 1 disorder. He came to the ED with abdominal pain, nausea, vomiting, and reported alcohol withdrawals. Pt also reports gaining 50 lbs over the past 6 months. He has been having severe insomnia for the past 30 days due to multiple stressors in his life. He was taking seroquel but it has not been working. He drinks immeasurable amounts of non-specific alcoholic beverages daily. He has not been able to keep anything down for the past two days. Pain is located in his left upper quadrant and is worse with palpation. The pain is described as cramping and started two days ago. Pt has had decreased intake and output over the past few days. He reports episodes of chills and sweats at home. Pt has allergy to ativan but is able to take diazepam. He has been admitted to hospital medicine and placed in the ICU."  Overview/Hospital Course:  Patient is a 37-year-old man with evidence of severe acute pancreatitis secondary to severe triglyceridemia and alcohol abuse. Patient presented with fever, leukocytosis, and severe lactic acidosis.  Severe inflammatory response likely due to acute pancreatitis without convincing evidence of a source of infection at this time.  Patient treated aggressive isotonic intravenous fluid resuscitation.  Lactic acidosis improving with volume resuscitation.   Patient with significant agitation overnight likely related to confusion related to metabolic encephalopathy and also alcohol withdrawal.  Patient started " "continuous dexmedetomidine with improvement in mental status/agitation. Psychiatry service consulted and recommended PEC due to grave debility related to substance abuse and concern for underlying mental illness.   Patient also started on continuous infusion to treat severe hypertriglyceridemia.   Patient abdominal pain improving and now tolerating low fat diet.  Interval History: Patient is awake and alert this morning.  Still with moderate abdominal pain, worse with eating - a little better from yesterday  No chest pain or SOB.  No significant alcohol withdrawal."    On interview by me today:  Pt. Reports anxiety. Stress related to family, finances.  Pt. Would like to receive Xanax, Valium or Wellbutrin. I gave him my opinion, that Xanax or Valium are not advisable. I encouraged him to consider substance use rehab. I told him, that Wellbutrin may not be advisable, due to the risk of seizures, if he withdraws from alcohol in the future.  I asked the patient about a h/o donta, there does not appear to be a h/o donta.  I mentioned the possible option of Zoloft, however he requested Valium or Xanax.    Pt. Prefers, that I not contact any source of collateral info.    From [place] :  Filled  Written  Sold  ID  Drug  QTY  Days  Prescriber  RX #  Dispenser  Refill  Daily Dose*  Pymt Type       04/08/2022 04/08/2022 04/08/2022  1  Dextroamp-Amphet Er 20 Mg Cap   60.00  30  [place]  [id number]  [place] ([id number])  0/0   [place]     03/14/2022 03/14/2022 03/14/2022  1  Dextroamp-Amphet Er 30 Mg Cap   30.00  30  [place]  [id number]  [place] ([id number])  0/0   [place]     03/07/2022 02/22/2022 03/07/2022  3  Adderall Xr 30 Mg Capsule   30.00  30  [place]  [id number]  [place] ([id number])  0/0   [place]     02/05/2022 01/29/2022 02/05/2022  3  Dextroamp-Amphet Er 20 Mg Cap   60.00  30  [place]  [id number]  [place] ([id number])  0/0   [place]     01/12/2022 01/12/2022 01/13/2022  1  Phentermine 37.5 Mg Tablet   30.00  30  [place]  [id number]  [place] " ([id number])  0/0   [place]     01/06/2022 01/06/2022 01/06/2022  1  Dextroamp-Amphet Er 20 Mg Cap   60.00  30  [place]  [id number]  [place] ([id number])  0/0   [place]     12/01/2021 12/01/2021 12/01/2021  3  Dextroamp-Amphet Er 20 Mg Cap   60.00  30  [place]  [id number]  [place] ([id number])  0/[place]   [place]     10/28/2021  10/28/2021  10/29/2021  1  Dextroamp-Amphet Er 20 Mg Cap   60.00  30  [place]  [id number]  [place] ([id number])  0/0   [place]     09/24/2021 07/22/2021 09/24/2021  1  Dextroamp-Amphet Er 20 Mg Cap   60.00  30  [place]  [id number]  [place] ([id number])  0/0   [place]     08/25/2021 08/25/2021 08/25/2021  1  Dextroamp-Amphet Er 20 Mg Cap   60.00  30  [place]  [id number]  [place] ([id number])  0/0   [place]     07/26/2021 07/26/2021 07/26/2021  3  Dextroamp-Amphet Er 20 Mg Cap   60.00  30  [place]  [id number]  [place] ([id number])  0/0   [place]           Current Medications:   Scheduled Meds:    atorvastatin  80 mg Oral Daily    enoxaparin  40 mg Subcutaneous Daily    fenofibrate  160 mg Oral Daily    folic acid  1 mg Oral Daily    multivitamin  1 tablet Oral Daily    mupirocin   Nasal BID    pantoprazole  40 mg Oral Daily    polyethylene glycol  17 g Oral BID    QUEtiapine  300 mg Oral QHS    sodium chloride 0.9%  10 mL Intravenous Q6H    thiamine  100 mg Oral Daily      PRN Meds: acetaminophen, calcium carbonate, dextrose 10%, dextrose 10%, diazePAM, HYDROmorphone, labetaloL, naloxone, ondansetron, oxyCODONE, polyethylene glycol, promethazine (PHENERGAN) IVPB, Flushing PICC Protocol **AND** sodium chloride 0.9% **AND** sodium chloride 0.9%, zolpidem   Psychotherapeutics (From admission, onward)            Start     Stop Route Frequency Ordered    07/04/22 1329  zolpidem tablet 10 mg         -- Oral Nightly PRN 07/04/22 1230    07/03/22 0115  QUEtiapine tablet 300 mg         -- Oral Nightly 07/03/22 0013    07/02/22 1806  diazePAM injection 10 mg         -- IV Every 4 hours PRN 07/02/22 1806          Allergies:   Review of  patient's allergies indicates:   Allergen Reactions    Ativan [lorazepam] Other (See Comments)     Muscle spasms in neck & back when taking medication.        OBJECTIVE:   Vitals   Vitals:    07/06/22 1325   BP: (!) 132/90   Pulse: 101   Resp: 18   Temp: 97.9 °F (36.6 °C)        Labs/Imaging/Studies:   Recent Results (from the past 36 hour(s))   Triglycerides    Collection Time: 07/05/22  5:33 AM   Result Value Ref Range    Triglycerides 550 (H) 30 - 150 mg/dL   CBC auto differential    Collection Time: 07/05/22  5:33 AM   Result Value Ref Range    WBC 7.41 3.90 - 12.70 K/uL    RBC 3.69 (L) 4.60 - 6.20 M/uL    Hemoglobin 11.0 (L) 14.0 - 18.0 g/dL    Hematocrit 33.6 (L) 40.0 - 54.0 %    MCV 91 82 - 98 fL    MCH 29.8 27.0 - 31.0 pg    MCHC 32.7 32.0 - 36.0 g/dL    RDW 17.6 (H) 11.5 - 14.5 %    Platelets 297 150 - 450 K/uL    MPV 10.2 9.2 - 12.9 fL    Immature Granulocytes CANCELED 0.0 - 0.5 %    Immature Grans (Abs) CANCELED 0.00 - 0.04 K/uL    Lymph # CANCELED 1.0 - 4.8 K/uL    Mono # CANCELED 0.3 - 1.0 K/uL    Eos # CANCELED 0.0 - 0.5 K/uL    Baso # CANCELED 0.00 - 0.20 K/uL    nRBC 1 (A) 0 /100 WBC    Gran % 43.0 38.0 - 73.0 %    Lymph % 31.0 18.0 - 48.0 %    Mono % 11.0 4.0 - 15.0 %    Eosinophil % 1.0 0.0 - 8.0 %    Basophil % 1.0 0.0 - 1.9 %    Bands 6.0 %    Metamyelocytes 5.0 %    Myelocytes 2.0 %    Platelet Estimate Appears normal     Aniso Slight     Poik Slight     Poly Occasional     Hypo Occasional     Target Cells Moderate     Toxic Granulation Present     Large/Giant Platelets Present     Vacuolated Granulocytes Present     Differential Method Manual    Comprehensive metabolic panel    Collection Time: 07/05/22  5:33 AM   Result Value Ref Range    Sodium 139 136 - 145 mmol/L    Potassium 4.2 3.5 - 5.1 mmol/L    Chloride 100 95 - 110 mmol/L    CO2 29 23 - 29 mmol/L    Glucose 107 70 - 110 mg/dL    BUN 4 (L) 6 - 20 mg/dL    Creatinine 1.2 0.5 - 1.4 mg/dL    Calcium 9.2 8.7 - 10.5 mg/dL    Total Protein  6.9 6.0 - 8.4 g/dL    Albumin 2.2 (L) 3.5 - 5.2 g/dL    Total Bilirubin 1.1 (H) 0.1 - 1.0 mg/dL    Alkaline Phosphatase 206 (H) 55 - 135 U/L     (H) 10 - 40 U/L    ALT 83 (H) 10 - 44 U/L    Anion Gap 10 8 - 16 mmol/L    eGFR if African American >60 >60 mL/min/1.73 m^2    eGFR if non African American >60 >60 mL/min/1.73 m^2   Triglycerides    Collection Time: 07/06/22  4:25 AM   Result Value Ref Range    Triglycerides 440 (H) 30 - 150 mg/dL   CBC auto differential    Collection Time: 07/06/22  4:25 AM   Result Value Ref Range    WBC 6.59 3.90 - 12.70 K/uL    RBC 3.47 (L) 4.60 - 6.20 M/uL    Hemoglobin 10.5 (L) 14.0 - 18.0 g/dL    Hematocrit 32.0 (L) 40.0 - 54.0 %    MCV 92 82 - 98 fL    MCH 30.3 27.0 - 31.0 pg    MCHC 32.8 32.0 - 36.0 g/dL    RDW 17.8 (H) 11.5 - 14.5 %    Platelets 320 150 - 450 K/uL    MPV 10.1 9.2 - 12.9 fL    Immature Granulocytes CANCELED 0.0 - 0.5 %    Immature Grans (Abs) CANCELED 0.00 - 0.04 K/uL    Lymph # CANCELED 1.0 - 4.8 K/uL    Mono # CANCELED 0.3 - 1.0 K/uL    Eos # CANCELED 0.0 - 0.5 K/uL    Baso # CANCELED 0.00 - 0.20 K/uL    nRBC 1 (A) 0 /100 WBC    Gran % 57.0 38.0 - 73.0 %    Lymph % 21.0 18.0 - 48.0 %    Mono % 16.0 (H) 4.0 - 15.0 %    Eosinophil % 4.0 0.0 - 8.0 %    Basophil % 0.0 0.0 - 1.9 %    Bands 2.0 %    Platelet Estimate Appears normal     Aniso Slight     Poly Occasional     Hypo Occasional     Differential Method Manual    Comprehensive metabolic panel    Collection Time: 07/06/22  4:25 AM   Result Value Ref Range    Sodium 140 136 - 145 mmol/L    Potassium 4.1 3.5 - 5.1 mmol/L    Chloride 100 95 - 110 mmol/L    CO2 28 23 - 29 mmol/L    Glucose 108 70 - 110 mg/dL    BUN 5 (L) 6 - 20 mg/dL    Creatinine 1.4 0.5 - 1.4 mg/dL    Calcium 8.7 8.7 - 10.5 mg/dL    Total Protein 6.6 6.0 - 8.4 g/dL    Albumin 2.1 (L) 3.5 - 5.2 g/dL    Total Bilirubin 1.0 0.1 - 1.0 mg/dL    Alkaline Phosphatase 200 (H) 55 - 135 U/L     (H) 10 - 40 U/L    ALT 89 (H) 10 - 44 U/L     Anion Gap 12 8 - 16 mmol/L    eGFR if African American >60 >60 mL/min/1.73 m^2    eGFR if non African American >60 >60 mL/min/1.73 m^2        Mental Status Exam:   Arousal: alert  Appearance: appears stated age  Sensorium/Orientation: grossly intact  Behavior/Cooperation: cooperative   Psychomotor: calm  Speech: normal r/r/v   Language: normal use of language  Mood: steady   Affect: appropriate   Thought Process: goal directed   Thought Content: denies SI/HI  Associations: intact  Attention/Concentration: intact for interview  Memory: grossly intact   Insight: appears fair   Judgment: appears fair    ASSESSMENT/PLAN:   Impression:   Alcohol Use  Anxiety, unspecified  Insomnia  Today , ALT 89, Triglycerides 440    Case d/w Dr. Noguera    Recommendations:   - obtain EKG  - I encouraged the patient to consider substance use rehab  - if pt. Not agreeable to substance use rehab, please have  arrange for outpatient psychiatric f/u and for outpatient psychotherapy  - can continue Ambien 10 mg at bedtime prn for 1-2 days, would then discontinue Ambien  - can continue Seroquel 300 mg at bedtime for now, however would consider tapering off Seroquel in the future  - obtain telepsych f/u prn    Total time including chart review, time with patient, obtaining collateral info[if necessary/possible]: 85 min  Mike Noriega M.D.  7/6/2022

## 2022-07-06 NOTE — SUBJECTIVE & OBJECTIVE
Interval History: Patient is awake and alert this morning.  Still with moderate abdominal pain, worse with eating - a little better from yesterday  No chest pain or SOB.  No significant alcohol withdrawal.    Review of Systems   Constitutional:  Negative for chills and fever.   HENT:  Negative for ear pain.    Eyes:  Negative for pain.   Respiratory:  Negative for shortness of breath.    Cardiovascular:  Negative for chest pain.   Gastrointestinal:  Positive for abdominal pain and nausea. Negative for abdominal distention, constipation, diarrhea and vomiting.   Genitourinary:  Negative for difficulty urinating and dysuria.   Musculoskeletal:  Negative for arthralgias, back pain and gait problem.   Skin:  Negative for rash.   Neurological:  Negative for dizziness and facial asymmetry.   Psychiatric/Behavioral:  Negative for agitation, behavioral problems and confusion.      Objective:     Vital Signs (Most Recent):  Temp: 99 °F (37.2 °C) (07/06/22 0818)  Pulse: 104 (07/06/22 0818)  Resp: 20 (07/06/22 1034)  BP: 126/73 (07/06/22 0818)  SpO2: (!) 94 % (07/06/22 0818)   Vital Signs (24h Range):  Temp:  [97.6 °F (36.4 °C)-99 °F (37.2 °C)] 99 °F (37.2 °C)  Pulse:  [103-106] 104  Resp:  [17-20] 20  SpO2:  [93 %-95 %] 94 %  BP: (126-145)/(71-89) 126/73     Weight: (!) 164.2 kg (361 lb 15.9 oz)  Body mass index is 45.25 kg/m².    Intake/Output Summary (Last 24 hours) at 7/6/2022 1114  Last data filed at 7/6/2022 0000  Gross per 24 hour   Intake 480 ml   Output --   Net 480 ml        Physical Exam  Constitutional:       General: He is not in acute distress.     Appearance: He is obese. He is not ill-appearing.   HENT:      Head: Normocephalic and atraumatic.      Right Ear: External ear normal.      Left Ear: External ear normal.      Nose: Nose normal.      Mouth/Throat:      Mouth: Mucous membranes are moist.      Pharynx: Oropharynx is clear.   Eyes:      General: No scleral icterus.     Pupils: Pupils are equal, round,  and reactive to light.   Cardiovascular:      Rate and Rhythm: Normal rate and regular rhythm.      Heart sounds: Normal heart sounds. No murmur heard.    No friction rub. No gallop.   Pulmonary:      Effort: Pulmonary effort is normal. No respiratory distress.      Breath sounds: Normal breath sounds. No wheezing or rales.   Abdominal:      General: Bowel sounds are decreased. There is no distension.      Palpations: Abdomen is soft.      Tenderness: There is no abdominal tenderness. There is no guarding.   Musculoskeletal:         General: No swelling or tenderness. Normal range of motion.      Cervical back: Normal range of motion and neck supple.   Skin:     General: Skin is warm and dry.      Coloration: Skin is not pale.      Findings: No erythema or rash.   Neurological:      General: No focal deficit present.      Mental Status: He is alert and oriented to person, place, and time. Mental status is at baseline.   Psychiatric:         Mood and Affect: Mood normal.         Behavior: Behavior normal.       Significant Labs: All pertinent labs within the past 24 hours have been reviewed.    Significant Imaging: I have reviewed all pertinent imaging results/findings within the past 24 hours.

## 2022-07-06 NOTE — ASSESSMENT & PLAN NOTE
Patient reported gaining 50 lbs over the past 6 months. He has been having severe insomnia for the past 30 days due to multiple stressors in his life. He was taking seroquel but it has not been working. He drinks immeasurable amounts of non-specific alcoholic beverages daily.  CIWA-Ar low this morning.  He did not require any prn Valium yesterday.    Plan:  Continue with CIWA q4 with prn IV Valium  Continue with MVI/Thiamine/Folic Acid

## 2022-07-06 NOTE — PLAN OF CARE
Pt requesting additional psych consult for anti-anxiety medication. Notified MD. Order placed.    Problem: Adult Inpatient Plan of Care  Goal: Plan of Care Review  Outcome: Ongoing, Progressing  Goal: Optimal Comfort and Wellbeing  Outcome: Ongoing, Progressing  Goal: Readiness for Transition of Care  Outcome: Ongoing, Progressing

## 2022-07-07 VITALS
OXYGEN SATURATION: 91 % | HEART RATE: 108 BPM | SYSTOLIC BLOOD PRESSURE: 163 MMHG | WEIGHT: 315 LBS | HEIGHT: 75 IN | RESPIRATION RATE: 16 BRPM | TEMPERATURE: 100 F | DIASTOLIC BLOOD PRESSURE: 85 MMHG | BODY MASS INDEX: 39.17 KG/M2

## 2022-07-07 LAB
ALBUMIN SERPL BCP-MCNC: 2.3 G/DL (ref 3.5–5.2)
ALP SERPL-CCNC: 184 U/L (ref 55–135)
ALT SERPL W/O P-5'-P-CCNC: 91 U/L (ref 10–44)
ANION GAP SERPL CALC-SCNC: 10 MMOL/L (ref 8–16)
ANISOCYTOSIS BLD QL SMEAR: SLIGHT
AST SERPL-CCNC: 206 U/L (ref 10–40)
BASOPHILS # BLD AUTO: ABNORMAL K/UL (ref 0–0.2)
BASOPHILS NFR BLD: 2 % (ref 0–1.9)
BILIRUB SERPL-MCNC: 0.9 MG/DL (ref 0.1–1)
BUN SERPL-MCNC: 5 MG/DL (ref 6–20)
CALCIUM SERPL-MCNC: 8.5 MG/DL (ref 8.7–10.5)
CHLORIDE SERPL-SCNC: 100 MMOL/L (ref 95–110)
CO2 SERPL-SCNC: 29 MMOL/L (ref 23–29)
CREAT SERPL-MCNC: 1.3 MG/DL (ref 0.5–1.4)
DIFFERENTIAL METHOD: ABNORMAL
EOSINOPHIL # BLD AUTO: ABNORMAL K/UL (ref 0–0.5)
EOSINOPHIL NFR BLD: 0 % (ref 0–8)
ERYTHROCYTE [DISTWIDTH] IN BLOOD BY AUTOMATED COUNT: 17.4 % (ref 11.5–14.5)
EST. GFR  (AFRICAN AMERICAN): >60 ML/MIN/1.73 M^2
EST. GFR  (NON AFRICAN AMERICAN): >60 ML/MIN/1.73 M^2
GLUCOSE SERPL-MCNC: 101 MG/DL (ref 70–110)
HCT VFR BLD AUTO: 32.1 % (ref 40–54)
HGB BLD-MCNC: 10.5 G/DL (ref 14–18)
HYPOCHROMIA BLD QL SMEAR: ABNORMAL
IMM GRANULOCYTES # BLD AUTO: ABNORMAL K/UL (ref 0–0.04)
IMM GRANULOCYTES NFR BLD AUTO: ABNORMAL % (ref 0–0.5)
LYMPHOCYTES # BLD AUTO: ABNORMAL K/UL (ref 1–4.8)
LYMPHOCYTES NFR BLD: 21 % (ref 18–48)
MCH RBC QN AUTO: 29.8 PG (ref 27–31)
MCHC RBC AUTO-ENTMCNC: 32.7 G/DL (ref 32–36)
MCV RBC AUTO: 91 FL (ref 82–98)
METAMYELOCYTES NFR BLD MANUAL: 1 %
MONOCYTES # BLD AUTO: ABNORMAL K/UL (ref 0.3–1)
MONOCYTES NFR BLD: 14 % (ref 4–15)
MYELOCYTES NFR BLD MANUAL: 2 %
NEUTROPHILS NFR BLD: 59 % (ref 38–73)
NEUTS BAND NFR BLD MANUAL: 1 %
NRBC BLD-RTO: 1 /100 WBC
PLATELET # BLD AUTO: 345 K/UL (ref 150–450)
PLATELET BLD QL SMEAR: ABNORMAL
PMV BLD AUTO: 10.3 FL (ref 9.2–12.9)
POLYCHROMASIA BLD QL SMEAR: ABNORMAL
POTASSIUM SERPL-SCNC: 4.3 MMOL/L (ref 3.5–5.1)
PROT SERPL-MCNC: 6.9 G/DL (ref 6–8.4)
RBC # BLD AUTO: 3.52 M/UL (ref 4.6–6.2)
SODIUM SERPL-SCNC: 139 MMOL/L (ref 136–145)
TARGETS BLD QL SMEAR: ABNORMAL
TRIGL SERPL-MCNC: 431 MG/DL (ref 30–150)
WBC # BLD AUTO: 6.96 K/UL (ref 3.9–12.7)

## 2022-07-07 PROCEDURE — 25000003 PHARM REV CODE 250: Performed by: INTERNAL MEDICINE

## 2022-07-07 PROCEDURE — 25000003 PHARM REV CODE 250: Performed by: NURSE PRACTITIONER

## 2022-07-07 PROCEDURE — 36415 COLL VENOUS BLD VENIPUNCTURE: CPT | Performed by: STUDENT IN AN ORGANIZED HEALTH CARE EDUCATION/TRAINING PROGRAM

## 2022-07-07 PROCEDURE — 99239 HOSP IP/OBS DSCHRG MGMT >30: CPT | Mod: ,,, | Performed by: INTERNAL MEDICINE

## 2022-07-07 PROCEDURE — 25000003 PHARM REV CODE 250: Performed by: STUDENT IN AN ORGANIZED HEALTH CARE EDUCATION/TRAINING PROGRAM

## 2022-07-07 PROCEDURE — 25000003 PHARM REV CODE 250: Performed by: HOSPITALIST

## 2022-07-07 PROCEDURE — 84478 ASSAY OF TRIGLYCERIDES: CPT | Performed by: INTERNAL MEDICINE

## 2022-07-07 PROCEDURE — 80053 COMPREHEN METABOLIC PANEL: CPT | Performed by: STUDENT IN AN ORGANIZED HEALTH CARE EDUCATION/TRAINING PROGRAM

## 2022-07-07 PROCEDURE — 85007 BL SMEAR W/DIFF WBC COUNT: CPT | Performed by: STUDENT IN AN ORGANIZED HEALTH CARE EDUCATION/TRAINING PROGRAM

## 2022-07-07 PROCEDURE — 25000242 PHARM REV CODE 250 ALT 637 W/ HCPCS: Performed by: HOSPITALIST

## 2022-07-07 PROCEDURE — 99239 PR HOSPITAL DISCHARGE DAY,>30 MIN: ICD-10-PCS | Mod: ,,, | Performed by: INTERNAL MEDICINE

## 2022-07-07 PROCEDURE — 85027 COMPLETE CBC AUTOMATED: CPT | Performed by: STUDENT IN AN ORGANIZED HEALTH CARE EDUCATION/TRAINING PROGRAM

## 2022-07-07 PROCEDURE — C1751 CATH, INF, PER/CENT/MIDLINE: HCPCS

## 2022-07-07 RX ORDER — PANTOPRAZOLE SODIUM 40 MG/1
40 TABLET, DELAYED RELEASE ORAL DAILY
Qty: 30 TABLET | Refills: 1 | Status: SHIPPED | OUTPATIENT
Start: 2022-07-08 | End: 2022-11-22

## 2022-07-07 RX ORDER — FENOFIBRATE 160 MG/1
160 TABLET ORAL DAILY
Qty: 30 TABLET | Refills: 1 | Status: SHIPPED | OUTPATIENT
Start: 2022-07-08 | End: 2022-11-22

## 2022-07-07 RX ORDER — SERTRALINE HYDROCHLORIDE 50 MG/1
50 TABLET, FILM COATED ORAL DAILY
Qty: 30 TABLET | Refills: 1 | Status: SHIPPED | OUTPATIENT
Start: 2022-07-07 | End: 2022-11-22

## 2022-07-07 RX ORDER — LANOLIN ALCOHOL/MO/W.PET/CERES
100 CREAM (GRAM) TOPICAL DAILY
Qty: 30 TABLET | Refills: 1 | Status: SHIPPED | OUTPATIENT
Start: 2022-07-08 | End: 2022-11-22

## 2022-07-07 RX ORDER — ATORVASTATIN CALCIUM 80 MG/1
80 TABLET, FILM COATED ORAL DAILY
Qty: 30 TABLET | Refills: 1 | Status: SHIPPED | OUTPATIENT
Start: 2022-07-08 | End: 2023-02-09 | Stop reason: CLARIF

## 2022-07-07 RX ORDER — FOLIC ACID 1 MG/1
1 TABLET ORAL DAILY
Qty: 30 TABLET | Refills: 1 | Status: SHIPPED | OUTPATIENT
Start: 2022-07-08 | End: 2022-11-22

## 2022-07-07 RX ORDER — OXYCODONE AND ACETAMINOPHEN 10; 325 MG/1; MG/1
1 TABLET ORAL EVERY 4 HOURS PRN
Qty: 31 TABLET | Refills: 0 | Status: SHIPPED | OUTPATIENT
Start: 2022-07-07 | End: 2022-07-14

## 2022-07-07 RX ADMIN — OXYCODONE 5 MG: 5 TABLET ORAL at 08:07

## 2022-07-07 RX ADMIN — THIAMINE HCL TAB 100 MG 100 MG: 100 TAB at 08:07

## 2022-07-07 RX ADMIN — MUPIROCIN: 20 OINTMENT TOPICAL at 08:07

## 2022-07-07 RX ADMIN — FENOFIBRATE 160 MG: 160 TABLET ORAL at 08:07

## 2022-07-07 RX ADMIN — FOLIC ACID 1 MG: 1 TABLET ORAL at 08:07

## 2022-07-07 RX ADMIN — PANTOPRAZOLE SODIUM 40 MG: 40 TABLET, DELAYED RELEASE ORAL at 08:07

## 2022-07-07 RX ADMIN — ATORVASTATIN CALCIUM 80 MG: 20 TABLET, FILM COATED ORAL at 08:07

## 2022-07-07 RX ADMIN — THERA TABS 1 TABLET: TAB at 08:07

## 2022-07-07 NOTE — DISCHARGE INSTRUCTIONS
For your Pancreatitis, this was likely a combination of alcohol use and high triglycerides.  Please continue to avoid alcohol use and change your diet to a low fat/cholesterol diet.  Please continue to following medications on discharge:  Atorvastatin 80mg once a day (cholesterol and triglycerides)  Fenofibrate 160mg once a day (triglycerides)  Percocet (oxycodone/acetaminophen) 10-325mg every 4 hours (pain) - please do not combine with other sedative medications and do not drive while taking.    For your fatty liver and obesity, please focus on weight loss thru diet and exercise.  A referral for Gastroenterology was placed for you.    For your Anxiety, continue with Seroquel at bedtime.  You will be started on Zoloft (sertraline) 50mg once a day.  Given your alcohol abuse, please continue with a Multivitamin daily, Thiamine 100mg daily, and Folic Acid Daily for at least a month.  A referral for Psychiatry and Psychology (therapy) will be placed for you.  Please continue to avoid alcohol use.      Please follow up with your Primary Care Provider within one week of discharge.

## 2022-07-07 NOTE — ASSESSMENT & PLAN NOTE
Patient reports relief with Zolpidem along with home dose of Quetiapine.   Long term Zolpidem not advised.  -D/c Ambien on discharge  -Zoloft added as above

## 2022-07-07 NOTE — NURSING
Pt's JENNY PICC d/c'd w/tip intact, pressure held for 4 min, pt given discharge f/u & prescription info, pt verbalized understanding and all questions addressed, pt wheeled off unit per transport to front for lyft provided w/all belongings and home meds delivered by [facility]

## 2022-07-07 NOTE — ASSESSMENT & PLAN NOTE
"Patient presented with abdominal pain, nausea, vomiting, and reported alcohol withdrawal.  CT of A/P with retroperitoneal edema felt to represent acute pancreatitis.  Ultrasound negative for biliary obstruction.   Lipase on admission elevated at 281, TBili 2.4, Alk Phos 369, AST/CYU264/113, TGs elevated at 1697->5680.  Treated initially with IV Insulin infusion with improved TGs - stopped on 7/3/2022.  Transferred out of ICU on 7/4/2022.  Pain improving, but still with post-prandial pain.   yesterday.      Seen by GI - "37 y.o. man with hx of bipolar disorder and alcohol abuse admitted with severe pancreatitis 2/2 hypertriglyceridemia and alcohol.  His symptoms are improving. CT unremarkable.TGs trending down- now less than 500.  Recommendations: 1. May wean insulin/ D10. 2. Continue fenofibrate and atorvastatin. 3. Continue low fat diet 4. Weight loss 5. ETOH cessation 6. MVI, thiamine and folate 7. Outpatient endocrine f/u 8. Establish therapist along with psychiatry".     Plan:  Continue with pain control and anti-emetics - transition to oral Percocet on discharge  Continue with Low Fat diet  Continue with Fenofibate and Atorvastatin  Follow up with GI for Pancreatitis and Hepatic Steatosis      "

## 2022-07-07 NOTE — ASSESSMENT & PLAN NOTE
Patient reported gaining 50 lbs over the past 6 months. He has been having severe insomnia for the past 30 days due to multiple stressors in his life. He was taking seroquel but it has not been working. He drinks immeasurable amounts of non-specific alcoholic beverages daily.  CIWA-Ar low this morning.  He did not require any prn Valium yesterday.    Plan:  Follow up with PCP on discharge  Start Zoloft for Anxiety as per Psychiatry.  Continue with Seroquel at bedtime.  Continue with MVI/Thiamine/Folic Acid  Follow up with Psychiatry and Psychology on discharge

## 2022-07-07 NOTE — PLAN OF CARE
Problem: Adult Inpatient Plan of Care  Goal: Plan of Care Review  Outcome: Met  Goal: Patient-Specific Goal (Individualized)  Outcome: Met  Goal: Absence of Hospital-Acquired Illness or Injury  Outcome: Met  Goal: Optimal Comfort and Wellbeing  Outcome: Met  Goal: Readiness for Transition of Care  Outcome: Met   Adequate for discharge

## 2022-07-07 NOTE — DISCHARGE SUMMARY
"[facility]  Discharge Summary      Patient Name: Lola Wetzel  MRN: [id number]  Patient Class: IP- Inpatient  Admission Date: 6/28/2022  Hospital Length of Stay: 9 days  Discharge Date and Time:  07/07/2022 10:17 AM  Attending Physician: Louis Noguera MD   Discharging Provider: Louis Noguera MD  Primary Care Provider: Janet Alaniz DO      HPI:   Per Ileana Uribe, DNP:    "Mr Davila is a 37 yr old male with PMH that includes insomnia and bipolar 1 disorder. He came to the ED with abdominal pain, nausea, vomiting, and reported alcohol withdrawals. Pt also reports gaining 50 lbs over the past 6 months. He has been having severe insomnia for the past 30 days due to multiple stressors in his life. He was taking seroquel but it has not been working. He drinks immeasurable amounts of non-specific alcoholic beverages daily. He has not been able to keep anything down for the past two days. Pain is located in his left upper quadrant and is worse with palpation. The pain is described as cramping and started two days ago. Pt has had decreased intake and output over the past few days. He reports episodes of chills and sweats at home. Pt has allergy to ativan but is able to take diazepam. He has been admitted to hospital medicine and placed in the ICU."      * No surgery found *      Hospital Course:   Patient is a 37-year-old man with evidence of severe acute pancreatitis secondary to severe triglyceridemia and alcohol abuse. Patient presented with fever, leukocytosis, and severe lactic acidosis.  Severe inflammatory response likely due to acute pancreatitis without convincing evidence of a source of infection at this time.  Patient treated aggressive isotonic intravenous fluid resuscitation.  Lactic acidosis improving with volume resuscitation.       Patient with significant agitation overnight likely related to confusion related to metabolic encephalopathy and also " alcohol withdrawal.  Patient started continuous dexmedetomidine with improvement in mental status/agitation. Psychiatry service consulted and recommended PEC due to grave debility related to substance abuse and concern for underlying mental illness.       Patient also started on continuous infusion to treat severe hypertriglyceridemia.   Patient abdominal pain improving and now tolerating low fat diet.    See below       Goals of Care Treatment Preferences:  Code Status: Full Code      Consults:   Consults (From admission, onward)        Status Ordering Provider     Inpatient consult to Telemedicine - Psych  Once        Provider:  Mike Noriega MD    Acknowledged LISBETH STEPHEN     Inpatient consult to Telemedicine - Psyc  Once        Provider:  Noam Cuba MD    Completed LUIS, KANDY.     Inpatient consult to Pathology  Once        Provider:  (Not yet assigned)    Acknowledged TAHIR SMITHIS F.     Inpatient consult to Pulmonary Critical Care  Once        Provider:  Forest Nur MD    Completed LUIS KANDY.     Inpatient consult to Gastroenterology  Once        Provider:  Darinel Peace MD    Completed LUIS, KANDY.     Inpatient consult to PICC team ([facility] [place])  Once        Provider:  (Not yet assigned)    Acknowledged EDE POSEY     Inpatient consult to Telemedicine - Psyc  Once        Provider:  (Not yet assigned)    Acknowledged LISSY JOHNSON          * Acute pancreatitis  Patient presented with abdominal pain, nausea, vomiting, and reported alcohol withdrawal.  CT of A/P with retroperitoneal edema felt to represent acute pancreatitis.  Ultrasound negative for biliary obstruction.   Lipase on admission elevated at 281, TBili 2.4, Alk Phos 369, AST/HFT100/113, TGs elevated at 1697->5680.  Treated initially with IV Insulin infusion with improved TGs - stopped on 7/3/2022.  Transferred out of ICU on 7/4/2022.  Pain improving, but still with post-prandial pain.  TG  "440 yesterday.      Seen by GI - "37 y.o. man with hx of bipolar disorder and alcohol abuse admitted with severe pancreatitis 2/2 hypertriglyceridemia and alcohol.  His symptoms are improving. CT unremarkable.TGs trending down- now less than 500.  Recommendations: 1. May wean insulin/ D10. 2. Continue fenofibrate and atorvastatin. 3. Continue low fat diet 4. Weight loss 5. ETOH cessation 6. MVI, thiamine and folate 7. Outpatient endocrine f/u 8. Establish therapist along with psychiatry".     Plan:  Continue with pain control and anti-emetics - transition to oral Percocet on discharge  Continue with Low Fat diet  Continue with Fenofibate and Atorvastatin  Follow up with GI for Pancreatitis and Hepatic Steatosis        Alcohol dependence with withdrawal  Patient reported gaining 50 lbs over the past 6 months. He has been having severe insomnia for the past 30 days due to multiple stressors in his life. He was taking seroquel but it has not been working. He drinks immeasurable amounts of non-specific alcoholic beverages daily.  CIWA-Ar low this morning.  He did not require any prn Valium yesterday.    Plan:  Follow up with PCP on discharge  Start Zoloft for Anxiety as per Psychiatry.  Continue with Seroquel at bedtime.  Continue with MVI/Thiamine/Folic Acid  Follow up with Psychiatry and Psychology on discharge    Insomnia  Patient reports relief with Zolpidem along with home dose of Quetiapine.   Long term Zolpidem not advised.  -D/c Ambien on discharge  -Zoloft added as above    Acute metabolic encephalopathy  Secondary to severe inflammatory response from acute pancreatitis, alcohol abuse/withdrawal, and possibly underlying mental illness.  PEC/CEC on admission.  Agitation improved.  Weaned off Dexmedetomidine infusion.  Psychiatry re-evaluated 7/4/2022 recommended rescinding PEC.  PEC/CEC rescinded.      Hypertriglyceridemia  -See above  -Continue fenofibrate, statin therapy, and low fat diet.  -Follow up with " PCP      Final Active Diagnoses:    Diagnosis Date Noted POA    PRINCIPAL PROBLEM:  Acute pancreatitis [K85.90] 06/28/2022 Yes    Alcohol dependence with withdrawal [F10.239] 06/28/2022 Yes    Insomnia [G47.00] 07/04/2022 Yes    Acute metabolic encephalopathy [G93.41] 06/30/2022 Yes    Hypertriglyceridemia [E78.1] 06/28/2022 Yes      Problems Resolved During this Admission:    Diagnosis Date Noted Date Resolved POA    Alcohol use [Z72.89]  07/07/2022 Yes    Lactic acidosis [E87.2] 06/28/2022 07/03/2022 Yes       Discharged Condition: fair    Disposition: Home or Self Care    Follow Up:   Follow-up Information     Janet Alaniz DO Follow up in 1 week(s).    Specialty: Family Medicine  Contact information:  [address] [place] [id number]  [phone]                       Patient Instructions:      Ambulatory referral/consult to Gastroenterology   Standing Status: Future   Referral Priority: Routine Referral Type: Consultation   Referral Reason: Specialty Services Required   Requested Specialty: Gastroenterology   Number of Visits Requested: 1     Ambulatory referral/consult to Psychiatry   Standing Status: Future   Referral Priority: Routine Referral Type: Psychiatric   Referral Reason: Specialty Services Required   Requested Specialty: Psychiatry   Number of Visits Requested: 1     Ambulatory referral/consult to Psychology   Standing Status: Future   Referral Priority: Routine Referral Type: Psychiatric   Referral Reason: Specialty Services Required   Requested Specialty: Psychology   Number of Visits Requested: 1     Notify your health care provider if you experience any of the following:  temperature >100.4     Notify your health care provider if you experience any of the following:  persistent nausea and vomiting or diarrhea     Notify your health care provider if you experience any of the following:  severe uncontrolled pain     Activity as tolerated       Significant Diagnostic  Studies: Labs:   BMP:   Recent Labs   Lab 07/06/22 0425 07/07/22 0824    101    139   K 4.1 4.3    100   CO2 28 29   BUN 5* 5*   CREATININE 1.4 1.3   CALCIUM 8.7 8.5*    and CBC   Recent Labs   Lab 07/06/22 0425 07/07/22 0824   WBC 6.59 6.96   HGB 10.5* 10.5*   HCT 32.0* 32.1*    345       Pending Diagnostic Studies:     Procedure Component Value Units Date/Time    Triglycerides [[id number]] Collected: 07/07/22 0824    Order Status: Sent Lab Status: In process Updated: 07/07/22 0824    Specimen: Blood     Narrative:      Collection has been rescheduled by MARIAH at 07/07/2022 05:43 Reason:   Patient Refused nurse celeste notified         Medications:  Reconciled Home Medications:      Medication List      START taking these medications    atorvastatin 80 MG tablet  Commonly known as: LIPITOR  Take 1 tablet (80 mg total) by mouth once daily.  Start taking on: July 8, 2022     fenofibrate 160 MG Tab  Take 1 tablet (160 mg total) by mouth once daily.  Start taking on: July 8, 2022     folic acid 1 MG tablet  Commonly known as: FOLVITE  Take 1 tablet (1 mg total) by mouth once daily.  Start taking on: July 8, 2022     multivitamin Tab  Take 1 tablet by mouth once daily.  Start taking on: July 8, 2022     oxyCODONE-acetaminophen  mg per tablet  Commonly known as: PERCOCET  Take 1 tablet by mouth every 4 (four) hours as needed for Pain.     pantoprazole 40 MG tablet  Commonly known as: PROTONIX  Take 1 tablet (40 mg total) by mouth once daily.  Start taking on: July 8, 2022     sertraline 50 MG tablet  Commonly known as: ZOLOFT  Take 1 tablet (50 mg total) by mouth once daily.     thiamine 100 MG tablet  Take 1 tablet (100 mg total) by mouth once daily.  Start taking on: July 8, 2022        CONTINUE taking these medications    dextroamphetamine-amphetamine 20 MG 24 hr capsule  Commonly known as: ADDERALL XR  Take by mouth 2 (two) times daily.     QUEtiapine 300 MG Tab  Commonly known as:  SEROQUEL  Take 1 tablet (300 mg total) by mouth nightly. for 10 days            Indwelling Lines/Drains at time of discharge:   Lines/Drains/Airways     Peripherally Inserted Central Catheter Line  Duration           PICC Triple Lumen 06/29/22 0003 right basilic 8 days                Time spent on the discharge of patient: 35 minutes         Louis Noguera MD  Department of Hospital Medicine  [facility]

## 2022-07-07 NOTE — PLAN OF CARE
Pt states lives independently at home. Nurse to set up Lyft ride home. All follow up appointments added to AVS.       07/07/22 1146   Final Note   Assessment Type Final Discharge Note   Anticipated Discharge Disposition Home   Hospital Resources/Appts/Education Provided Provided patient/caregiver with written discharge plan information;Appointments scheduled and added to AVS;Appointments scheduled by Navigator/Coordinator   Post-Acute Status   Discharge Delays None known at this time   [facility] ([facility])  Discharge Final Note    Primary Care Provider: Janet Alaniz DO    Expected Discharge Date: 7/7/2022    Final Discharge Note (most recent)       Final Note - 07/07/22 1146          Final Note    Assessment Type Final Discharge Note (P)      Anticipated Discharge Disposition Home or Self Care (P)      Hospital Resources/Appts/Education Provided Provided patient/caregiver with written discharge plan information;Appointments scheduled and added to AVS;Appointments scheduled by Navigator/Coordinator (P)         Post-Acute Status    Discharge Delays None known at this time (P)                      Important Message from Medicare             Contact Info       Janet Alaniz DO   Specialty: Family Medicine   Relationship: PCP - General    [address]  [place] [address]   Phone: [phone]       Next Steps: Go on 7/14/2022    Instructions: Follow up appointment scheduled for 9:15am.    Sheyla Wallace MD   Specialty: Psychiatry    [address]  [place] [address]   Phone: [phone]       Next Steps: Go on 8/17/2022    Instructions: Follow up appointment scheduled for 1pm.

## 2022-07-08 ENCOUNTER — PATIENT OUTREACH (OUTPATIENT)
Dept: ADMINISTRATIVE | Facility: CLINIC | Age: 37
End: 2022-07-08
Payer: MEDICAID

## 2022-07-08 ENCOUNTER — PATIENT MESSAGE (OUTPATIENT)
Dept: ADMINISTRATIVE | Facility: CLINIC | Age: 37
End: 2022-07-08
Payer: MEDICAID

## 2022-07-08 NOTE — PROGRESS NOTES
C3 nurse attempted to contact Lola Wetzel  for a TCC post hospital discharge follow up call. No answer. Left voicemail with callback information. The patient has a scheduled HOSFU appointment with Lola Wetzel  on 07/14/2022 @ 0343.

## 2022-07-11 NOTE — PROGRESS NOTES
C3 nurse attempted to contact patient. No answer.  C3 nurse attempted to contact Hannahpedro BRIANNA Wetzel  for a TCC post hospital discharge follow up call. No answer at phone number listed and no voicemail available. The patient has a HOSFU appointment with Janet Alaniz DO  on 07/14/2022  @ 8015. Message sent to Physician staff.

## 2022-07-11 NOTE — PROGRESS NOTES
C3 nurse spoke with Lola Wetzel  for a TCC post hospital discharge follow up call. The patient has a scheduled HOSFU appointment with Janet Alaniz DO on 07/15/2022 @ 3151

## 2022-11-21 ENCOUNTER — HOSPITAL ENCOUNTER (INPATIENT)
Facility: HOSPITAL | Age: 37
LOS: 3 days | Discharge: HOME | DRG: 897 | End: 2022-11-24
Attending: EMERGENCY MEDICINE | Admitting: STUDENT IN AN ORGANIZED HEALTH CARE EDUCATION/TRAINING PROGRAM
Payer: MEDICAID

## 2022-11-21 DIAGNOSIS — F10.982 ALCOHOL-INDUCED INSOMNIA: ICD-10-CM

## 2022-11-21 DIAGNOSIS — F10.932 ALCOHOL WITHDRAWAL SYNDROME WITH PERCEPTUAL DISTURBANCE: Primary | ICD-10-CM

## 2022-11-21 DIAGNOSIS — R06.01 ORTHOPNEA: ICD-10-CM

## 2022-11-21 DIAGNOSIS — R07.9 CHEST PAIN: ICD-10-CM

## 2022-11-21 DIAGNOSIS — I50.9 CHF (CONGESTIVE HEART FAILURE): ICD-10-CM

## 2022-11-21 DIAGNOSIS — R00.0 TACHYCARDIA: ICD-10-CM

## 2022-11-21 DIAGNOSIS — E66.01 MORBID OBESITY: ICD-10-CM

## 2022-11-21 LAB
ALBUMIN SERPL BCP-MCNC: 3.5 G/DL (ref 3.5–5.2)
ALP SERPL-CCNC: 161 U/L (ref 55–135)
ALT SERPL W/O P-5'-P-CCNC: 87 U/L (ref 10–44)
ANION GAP SERPL CALC-SCNC: 14 MMOL/L (ref 8–16)
AST SERPL-CCNC: 126 U/L (ref 10–40)
BASOPHILS # BLD AUTO: 0.08 K/UL (ref 0–0.2)
BASOPHILS NFR BLD: 1.4 % (ref 0–1.9)
BILIRUB SERPL-MCNC: 0.4 MG/DL (ref 0.1–1)
BILIRUB UR QL STRIP: NEGATIVE
BNP SERPL-MCNC: <10 PG/ML (ref 0–99)
BUN SERPL-MCNC: 8 MG/DL (ref 6–20)
CALCIUM SERPL-MCNC: 9.1 MG/DL (ref 8.7–10.5)
CHLORIDE SERPL-SCNC: 99 MMOL/L (ref 95–110)
CLARITY UR REFRACT.AUTO: CLEAR
CO2 SERPL-SCNC: 25 MMOL/L (ref 23–29)
COLOR UR AUTO: YELLOW
CREAT SERPL-MCNC: 0.9 MG/DL (ref 0.5–1.4)
DIFFERENTIAL METHOD: ABNORMAL
EOSINOPHIL # BLD AUTO: 0.1 K/UL (ref 0–0.5)
EOSINOPHIL NFR BLD: 1.2 % (ref 0–8)
ERYTHROCYTE [DISTWIDTH] IN BLOOD BY AUTOMATED COUNT: 18.4 % (ref 11.5–14.5)
EST. GFR  (NO RACE VARIABLE): >60 ML/MIN/1.73 M^2
ETHANOL SERPL-MCNC: 62 MG/DL
GLUCOSE SERPL-MCNC: 103 MG/DL (ref 70–110)
GLUCOSE UR QL STRIP: NEGATIVE
HCT VFR BLD AUTO: 38.8 % (ref 40–54)
HGB BLD-MCNC: 12.8 G/DL (ref 14–18)
HGB UR QL STRIP: NEGATIVE
IMM GRANULOCYTES # BLD AUTO: 0.04 K/UL (ref 0–0.04)
IMM GRANULOCYTES NFR BLD AUTO: 0.7 % (ref 0–0.5)
KETONES UR QL STRIP: NEGATIVE
LEUKOCYTE ESTERASE UR QL STRIP: NEGATIVE
LYMPHOCYTES # BLD AUTO: 1.6 K/UL (ref 1–4.8)
LYMPHOCYTES NFR BLD: 27.4 % (ref 18–48)
MAGNESIUM SERPL-MCNC: 1.8 MG/DL (ref 1.6–2.6)
MCH RBC QN AUTO: 31.4 PG (ref 27–31)
MCHC RBC AUTO-ENTMCNC: 33 G/DL (ref 32–36)
MCV RBC AUTO: 95 FL (ref 82–98)
MONOCYTES # BLD AUTO: 0.6 K/UL (ref 0.3–1)
MONOCYTES NFR BLD: 9.8 % (ref 4–15)
NEUTROPHILS # BLD AUTO: 3.4 K/UL (ref 1.8–7.7)
NEUTROPHILS NFR BLD: 59.5 % (ref 38–73)
NITRITE UR QL STRIP: NEGATIVE
NRBC BLD-RTO: 0 /100 WBC
PH UR STRIP: 6 [PH] (ref 5–8)
PLATELET # BLD AUTO: 223 K/UL (ref 150–450)
PMV BLD AUTO: 10.5 FL (ref 9.2–12.9)
POTASSIUM SERPL-SCNC: 3.7 MMOL/L (ref 3.5–5.1)
PROT SERPL-MCNC: 7.8 G/DL (ref 6–8.4)
PROT UR QL STRIP: ABNORMAL
RBC # BLD AUTO: 4.07 M/UL (ref 4.6–6.2)
SODIUM SERPL-SCNC: 138 MMOL/L (ref 136–145)
SP GR UR STRIP: 1.02 (ref 1–1.03)
URN SPEC COLLECT METH UR: ABNORMAL
WBC # BLD AUTO: 5.69 K/UL (ref 3.9–12.7)

## 2022-11-21 PROCEDURE — 81003 URINALYSIS AUTO W/O SCOPE: CPT | Mod: 59 | Performed by: EMERGENCY MEDICINE

## 2022-11-21 PROCEDURE — 25000003 PHARM REV CODE 250: Performed by: EMERGENCY MEDICINE

## 2022-11-21 PROCEDURE — 80053 COMPREHEN METABOLIC PANEL: CPT | Performed by: EMERGENCY MEDICINE

## 2022-11-21 PROCEDURE — 99285 PR EMERGENCY DEPT VISIT,LEVEL V: ICD-10-PCS | Mod: ,,, | Performed by: EMERGENCY MEDICINE

## 2022-11-21 PROCEDURE — 83735 ASSAY OF MAGNESIUM: CPT | Performed by: EMERGENCY MEDICINE

## 2022-11-21 PROCEDURE — 80307 DRUG TEST PRSMV CHEM ANLYZR: CPT | Performed by: EMERGENCY MEDICINE

## 2022-11-21 PROCEDURE — 63600175 PHARM REV CODE 636 W HCPCS: Performed by: EMERGENCY MEDICINE

## 2022-11-21 PROCEDURE — 12000002 HC ACUTE/MED SURGE SEMI-PRIVATE ROOM

## 2022-11-21 PROCEDURE — 25000003 PHARM REV CODE 250: Performed by: STUDENT IN AN ORGANIZED HEALTH CARE EDUCATION/TRAINING PROGRAM

## 2022-11-21 PROCEDURE — 93010 ELECTROCARDIOGRAM REPORT: CPT | Mod: ,,, | Performed by: INTERNAL MEDICINE

## 2022-11-21 PROCEDURE — 99285 EMERGENCY DEPT VISIT HI MDM: CPT | Mod: ,,, | Performed by: EMERGENCY MEDICINE

## 2022-11-21 PROCEDURE — 94761 N-INVAS EAR/PLS OXIMETRY MLT: CPT

## 2022-11-21 PROCEDURE — 85025 COMPLETE CBC W/AUTO DIFF WBC: CPT | Performed by: EMERGENCY MEDICINE

## 2022-11-21 PROCEDURE — 96361 HYDRATE IV INFUSION ADD-ON: CPT

## 2022-11-21 PROCEDURE — 83690 ASSAY OF LIPASE: CPT | Performed by: EMERGENCY MEDICINE

## 2022-11-21 PROCEDURE — 99285 EMERGENCY DEPT VISIT HI MDM: CPT | Mod: 25

## 2022-11-21 PROCEDURE — 93005 ELECTROCARDIOGRAM TRACING: CPT

## 2022-11-21 PROCEDURE — 96374 THER/PROPH/DIAG INJ IV PUSH: CPT

## 2022-11-21 PROCEDURE — 93010 EKG 12-LEAD: ICD-10-PCS | Mod: ,,, | Performed by: INTERNAL MEDICINE

## 2022-11-21 PROCEDURE — 83880 ASSAY OF NATRIURETIC PEPTIDE: CPT | Performed by: EMERGENCY MEDICINE

## 2022-11-21 PROCEDURE — 82077 ASSAY SPEC XCP UR&BREATH IA: CPT | Performed by: EMERGENCY MEDICINE

## 2022-11-21 RX ORDER — DIAZEPAM 5 MG/1
10 TABLET ORAL
Status: COMPLETED | OUTPATIENT
Start: 2022-11-21 | End: 2022-11-21

## 2022-11-21 RX ORDER — ONDANSETRON 2 MG/ML
4 INJECTION INTRAMUSCULAR; INTRAVENOUS
Status: COMPLETED | OUTPATIENT
Start: 2022-11-21 | End: 2022-11-21

## 2022-11-21 RX ORDER — ACETAMINOPHEN 325 MG/1
650 TABLET ORAL
Status: COMPLETED | OUTPATIENT
Start: 2022-11-22 | End: 2022-11-21

## 2022-11-21 RX ADMIN — ACETAMINOPHEN 650 MG: 325 TABLET ORAL at 11:11

## 2022-11-21 RX ADMIN — DIAZEPAM 10 MG: 5 TABLET ORAL at 11:11

## 2022-11-21 RX ADMIN — ONDANSETRON 4 MG: 2 INJECTION INTRAMUSCULAR; INTRAVENOUS at 10:11

## 2022-11-21 RX ADMIN — SODIUM CHLORIDE 1000 ML: 0.9 INJECTION, SOLUTION INTRAVENOUS at 10:11

## 2022-11-22 PROBLEM — R10.9 ABDOMINAL PAIN: Status: ACTIVE | Noted: 2022-11-22

## 2022-11-22 PROBLEM — I10 HYPERTENSION: Status: ACTIVE | Noted: 2022-11-22

## 2022-11-22 PROBLEM — G47.30 SLEEP APNEA: Status: ACTIVE | Noted: 2022-11-22

## 2022-11-22 PROBLEM — R60.0 LOWER EXTREMITY EDEMA: Status: ACTIVE | Noted: 2022-11-22

## 2022-11-22 LAB
ALBUMIN SERPL BCP-MCNC: 3.5 G/DL (ref 3.5–5.2)
ALP SERPL-CCNC: 146 U/L (ref 55–135)
ALT SERPL W/O P-5'-P-CCNC: 86 U/L (ref 10–44)
AMPHET+METHAMPHET UR QL: NEGATIVE
ANION GAP SERPL CALC-SCNC: 14 MMOL/L (ref 8–16)
ASCENDING AORTA: 3.59 CM
AST SERPL-CCNC: 114 U/L (ref 10–40)
AV INDEX (PROSTH): 0.78
AV MEAN GRADIENT: 3 MMHG
AV PEAK GRADIENT: 6 MMHG
AV VALVE AREA: 2.97 CM2
AV VELOCITY RATIO: 0.65
BARBITURATES UR QL SCN>200 NG/ML: NEGATIVE
BASOPHILS # BLD AUTO: 0.05 K/UL (ref 0–0.2)
BASOPHILS NFR BLD: 0.7 % (ref 0–1.9)
BENZODIAZ UR QL SCN>200 NG/ML: ABNORMAL
BILIRUB SERPL-MCNC: 0.7 MG/DL (ref 0.1–1)
BUN SERPL-MCNC: 9 MG/DL (ref 6–20)
BZE UR QL SCN: NEGATIVE
CALCIUM SERPL-MCNC: 9.4 MG/DL (ref 8.7–10.5)
CANNABINOIDS UR QL SCN: NEGATIVE
CHLORIDE SERPL-SCNC: 100 MMOL/L (ref 95–110)
CO2 SERPL-SCNC: 23 MMOL/L (ref 23–29)
CREAT SERPL-MCNC: 1.1 MG/DL (ref 0.5–1.4)
CREAT UR-MCNC: 166 MG/DL (ref 23–375)
CV ECHO LV RWT: 0.27 CM
DIFFERENTIAL METHOD: ABNORMAL
DOP CALC AO PEAK VEL: 1.21 M/S
DOP CALC AO VTI: 18.13 CM
DOP CALC LVOT AREA: 3.8 CM2
DOP CALC LVOT DIAMETER: 2.2 CM
DOP CALC LVOT PEAK VEL: 0.79 M/S
DOP CALC LVOT STROKE VOLUME: 53.91 CM3
DOP CALCLVOT PEAK VEL VTI: 14.19 CM
E WAVE DECELERATION TIME: 196.97 MSEC
E/A RATIO: 1.76
E/E' RATIO: 13.33 M/S
ECHO LV POSTERIOR WALL: 0.81 CM (ref 0.6–1.1)
EJECTION FRACTION: 50 %
EOSINOPHIL # BLD AUTO: 0.1 K/UL (ref 0–0.5)
EOSINOPHIL NFR BLD: 1.5 % (ref 0–8)
ERYTHROCYTE [DISTWIDTH] IN BLOOD BY AUTOMATED COUNT: 18.7 % (ref 11.5–14.5)
EST. GFR  (NO RACE VARIABLE): >60 ML/MIN/1.73 M^2
FRACTIONAL SHORTENING: 15 % (ref 28–44)
GLUCOSE SERPL-MCNC: 107 MG/DL (ref 70–110)
HCT VFR BLD AUTO: 38.7 % (ref 40–54)
HCV AB SERPL QL IA: NORMAL
HGB BLD-MCNC: 12.7 G/DL (ref 14–18)
HIV 1+2 AB+HIV1 P24 AG SERPL QL IA: NORMAL
IMM GRANULOCYTES # BLD AUTO: 0.04 K/UL (ref 0–0.04)
IMM GRANULOCYTES NFR BLD AUTO: 0.6 % (ref 0–0.5)
INTERVENTRICULAR SEPTUM: 0.64 CM (ref 0.6–1.1)
LA MAJOR: 7.04 CM
LA MINOR: 6.16 CM
LA WIDTH: 4.02 CM
LEFT ATRIUM SIZE: 4.03 CM
LEFT ATRIUM VOLUME MOD: 60.73 CM3
LEFT ATRIUM VOLUME: 90.48 CM3
LEFT INTERNAL DIMENSION IN SYSTOLE: 5.08 CM (ref 2.1–4)
LEFT VENTRICLE DIASTOLIC VOLUME: 177 ML
LEFT VENTRICLE SYSTOLIC VOLUME: 122.81 ML
LEFT VENTRICULAR INTERNAL DIMENSION IN DIASTOLE: 5.96 CM (ref 3.5–6)
LEFT VENTRICULAR MASS: 162.97 G
LIPASE SERPL-CCNC: 13 U/L (ref 4–60)
LV LATERAL E/E' RATIO: 13.33 M/S
LV SEPTAL E/E' RATIO: 13.33 M/S
LYMPHOCYTES # BLD AUTO: 1.5 K/UL (ref 1–4.8)
LYMPHOCYTES NFR BLD: 21.6 % (ref 18–48)
MAGNESIUM SERPL-MCNC: 1.9 MG/DL (ref 1.6–2.6)
MCH RBC QN AUTO: 31.8 PG (ref 27–31)
MCHC RBC AUTO-ENTMCNC: 32.8 G/DL (ref 32–36)
MCV RBC AUTO: 97 FL (ref 82–98)
METHADONE UR QL SCN>300 NG/ML: NEGATIVE
MONOCYTES # BLD AUTO: 0.6 K/UL (ref 0.3–1)
MONOCYTES NFR BLD: 8.2 % (ref 4–15)
MV PEAK A VEL: 0.68 M/S
MV PEAK E VEL: 1.2 M/S
MV STENOSIS PRESSURE HALF TIME: 57.12 MS
MV VALVE AREA P 1/2 METHOD: 3.85 CM2
NEUTROPHILS # BLD AUTO: 4.6 K/UL (ref 1.8–7.7)
NEUTROPHILS NFR BLD: 67.4 % (ref 38–73)
NRBC BLD-RTO: 0 /100 WBC
OPIATES UR QL SCN: NEGATIVE
PCP UR QL SCN>25 NG/ML: NEGATIVE
PHOSPHATE SERPL-MCNC: 3.1 MG/DL (ref 2.7–4.5)
PLATELET # BLD AUTO: 202 K/UL (ref 150–450)
PMV BLD AUTO: 10.2 FL (ref 9.2–12.9)
POTASSIUM SERPL-SCNC: 4.1 MMOL/L (ref 3.5–5.1)
PROT SERPL-MCNC: 7.6 G/DL (ref 6–8.4)
RBC # BLD AUTO: 4 M/UL (ref 4.6–6.2)
RV TISSUE DOPPLER FREE WALL SYSTOLIC VELOCITY 1 (APICAL 4 CHAMBER VIEW): 12.63 CM/S
SINUS: 3.29 CM
SODIUM SERPL-SCNC: 137 MMOL/L (ref 136–145)
STJ: 3.51 CM
TDI LATERAL: 0.09 M/S
TDI SEPTAL: 0.09 M/S
TDI: 0.09 M/S
TOXICOLOGY INFORMATION: ABNORMAL
TRICUSPID ANNULAR PLANE SYSTOLIC EXCURSION: 2.38 CM
WBC # BLD AUTO: 6.85 K/UL (ref 3.9–12.7)

## 2022-11-22 PROCEDURE — 86803 HEPATITIS C AB TEST: CPT | Performed by: PHYSICIAN ASSISTANT

## 2022-11-22 PROCEDURE — 80053 COMPREHEN METABOLIC PANEL: CPT | Performed by: STUDENT IN AN ORGANIZED HEALTH CARE EDUCATION/TRAINING PROGRAM

## 2022-11-22 PROCEDURE — 25000003 PHARM REV CODE 250

## 2022-11-22 PROCEDURE — 25000003 PHARM REV CODE 250: Performed by: STUDENT IN AN ORGANIZED HEALTH CARE EDUCATION/TRAINING PROGRAM

## 2022-11-22 PROCEDURE — 99223 PR INITIAL HOSPITAL CARE,LEVL III: ICD-10-PCS | Mod: ,,, | Performed by: STUDENT IN AN ORGANIZED HEALTH CARE EDUCATION/TRAINING PROGRAM

## 2022-11-22 PROCEDURE — 63600175 PHARM REV CODE 636 W HCPCS

## 2022-11-22 PROCEDURE — 87389 HIV-1 AG W/HIV-1&-2 AB AG IA: CPT | Performed by: PHYSICIAN ASSISTANT

## 2022-11-22 PROCEDURE — 11000001 HC ACUTE MED/SURG PRIVATE ROOM

## 2022-11-22 PROCEDURE — 25500020 PHARM REV CODE 255: Performed by: STUDENT IN AN ORGANIZED HEALTH CARE EDUCATION/TRAINING PROGRAM

## 2022-11-22 PROCEDURE — 83735 ASSAY OF MAGNESIUM: CPT | Performed by: STUDENT IN AN ORGANIZED HEALTH CARE EDUCATION/TRAINING PROGRAM

## 2022-11-22 PROCEDURE — 84100 ASSAY OF PHOSPHORUS: CPT | Performed by: STUDENT IN AN ORGANIZED HEALTH CARE EDUCATION/TRAINING PROGRAM

## 2022-11-22 PROCEDURE — 85025 COMPLETE CBC W/AUTO DIFF WBC: CPT

## 2022-11-22 PROCEDURE — 25500020 PHARM REV CODE 255: Performed by: EMERGENCY MEDICINE

## 2022-11-22 PROCEDURE — 63600175 PHARM REV CODE 636 W HCPCS: Performed by: STUDENT IN AN ORGANIZED HEALTH CARE EDUCATION/TRAINING PROGRAM

## 2022-11-22 PROCEDURE — 99223 1ST HOSP IP/OBS HIGH 75: CPT | Mod: ,,, | Performed by: STUDENT IN AN ORGANIZED HEALTH CARE EDUCATION/TRAINING PROGRAM

## 2022-11-22 RX ORDER — ENOXAPARIN SODIUM 100 MG/ML
40 INJECTION SUBCUTANEOUS EVERY 12 HOURS
Status: DISCONTINUED | OUTPATIENT
Start: 2022-11-22 | End: 2022-11-24 | Stop reason: HOSPADM

## 2022-11-22 RX ORDER — CHLORDIAZEPOXIDE HYDROCHLORIDE 25 MG/1
25 CAPSULE, GELATIN COATED ORAL
Status: DISCONTINUED | OUTPATIENT
Start: 2022-11-25 | End: 2022-11-24 | Stop reason: HOSPADM

## 2022-11-22 RX ORDER — QUETIAPINE 300 MG/1
600 TABLET, FILM COATED, EXTENDED RELEASE ORAL NIGHTLY
Status: DISCONTINUED | OUTPATIENT
Start: 2022-11-22 | End: 2022-11-24 | Stop reason: HOSPADM

## 2022-11-22 RX ORDER — ATORVASTATIN CALCIUM 40 MG/1
80 TABLET, FILM COATED ORAL DAILY
Status: DISCONTINUED | OUTPATIENT
Start: 2022-11-22 | End: 2022-11-24 | Stop reason: HOSPADM

## 2022-11-22 RX ORDER — QUETIAPINE FUMARATE 300 MG/1
600 TABLET, FILM COATED ORAL NIGHTLY
COMMUNITY
Start: 2022-11-09

## 2022-11-22 RX ORDER — CHLORDIAZEPOXIDE HYDROCHLORIDE 25 MG/1
25 CAPSULE, GELATIN COATED ORAL
Status: DISCONTINUED | OUTPATIENT
Start: 2022-11-27 | End: 2022-11-24 | Stop reason: HOSPADM

## 2022-11-22 RX ORDER — THIAMINE HCL 100 MG
100 TABLET ORAL DAILY
Status: DISCONTINUED | OUTPATIENT
Start: 2022-11-22 | End: 2022-11-24 | Stop reason: HOSPADM

## 2022-11-22 RX ORDER — GLUCAGON 1 MG
1 KIT INJECTION
Status: DISCONTINUED | OUTPATIENT
Start: 2022-11-22 | End: 2022-11-24 | Stop reason: HOSPADM

## 2022-11-22 RX ORDER — LOSARTAN POTASSIUM 50 MG/1
50 TABLET ORAL DAILY
Status: CANCELLED | OUTPATIENT
Start: 2022-11-22

## 2022-11-22 RX ORDER — CHLORDIAZEPOXIDE HYDROCHLORIDE 25 MG/1
100 CAPSULE, GELATIN COATED ORAL ONCE
Status: COMPLETED | OUTPATIENT
Start: 2022-11-22 | End: 2022-11-22

## 2022-11-22 RX ORDER — SODIUM CHLORIDE 0.9 % (FLUSH) 0.9 %
10 SYRINGE (ML) INJECTION EVERY 12 HOURS PRN
Status: DISCONTINUED | OUTPATIENT
Start: 2022-11-22 | End: 2022-11-24 | Stop reason: HOSPADM

## 2022-11-22 RX ORDER — CHLORDIAZEPOXIDE HYDROCHLORIDE 25 MG/1
50 CAPSULE, GELATIN COATED ORAL
Status: COMPLETED | OUTPATIENT
Start: 2022-11-23 | End: 2022-11-24

## 2022-11-22 RX ORDER — CHLORDIAZEPOXIDE HYDROCHLORIDE 25 MG/1
50 CAPSULE, GELATIN COATED ORAL
Status: COMPLETED | OUTPATIENT
Start: 2022-11-22 | End: 2022-11-23

## 2022-11-22 RX ORDER — CHLORDIAZEPOXIDE HYDROCHLORIDE 10 MG/1
10 CAPSULE, GELATIN COATED ORAL
Status: DISCONTINUED | OUTPATIENT
Start: 2022-11-22 | End: 2022-11-24 | Stop reason: HOSPADM

## 2022-11-22 RX ORDER — LOSARTAN POTASSIUM 100 MG/1
100 TABLET ORAL DAILY
COMMUNITY
Start: 2022-11-01

## 2022-11-22 RX ORDER — ACETAMINOPHEN 325 MG/1
650 TABLET ORAL ONCE
Status: COMPLETED | OUTPATIENT
Start: 2022-11-22 | End: 2022-11-22

## 2022-11-22 RX ORDER — QUETIAPINE FUMARATE 300 MG/1
300 TABLET, FILM COATED ORAL NIGHTLY
Status: DISCONTINUED | OUTPATIENT
Start: 2022-11-22 | End: 2022-11-22

## 2022-11-22 RX ORDER — LOSARTAN POTASSIUM 50 MG/1
50 TABLET ORAL DAILY
Status: DISCONTINUED | OUTPATIENT
Start: 2022-11-22 | End: 2022-11-22

## 2022-11-22 RX ORDER — CHLORDIAZEPOXIDE HYDROCHLORIDE 25 MG/1
25 CAPSULE, GELATIN COATED ORAL
Status: DISCONTINUED | OUTPATIENT
Start: 2022-11-24 | End: 2022-11-24 | Stop reason: HOSPADM

## 2022-11-22 RX ORDER — CHLORDIAZEPOXIDE HYDROCHLORIDE 25 MG/1
25 CAPSULE, GELATIN COATED ORAL
Status: DISCONTINUED | OUTPATIENT
Start: 2022-11-26 | End: 2022-11-24 | Stop reason: HOSPADM

## 2022-11-22 RX ORDER — ONDANSETRON 2 MG/ML
4 INJECTION INTRAMUSCULAR; INTRAVENOUS
Status: COMPLETED | OUTPATIENT
Start: 2022-11-22 | End: 2022-11-22

## 2022-11-22 RX ORDER — PANTOPRAZOLE SODIUM 40 MG/1
40 TABLET, DELAYED RELEASE ORAL DAILY
Status: DISCONTINUED | OUTPATIENT
Start: 2022-11-22 | End: 2022-11-24 | Stop reason: HOSPADM

## 2022-11-22 RX ORDER — NALOXONE HCL 0.4 MG/ML
0.02 VIAL (ML) INJECTION
Status: DISCONTINUED | OUTPATIENT
Start: 2022-11-22 | End: 2022-11-24 | Stop reason: HOSPADM

## 2022-11-22 RX ORDER — ONDANSETRON 2 MG/ML
4 INJECTION INTRAMUSCULAR; INTRAVENOUS EVERY 8 HOURS PRN
Status: DISCONTINUED | OUTPATIENT
Start: 2022-11-22 | End: 2022-11-24

## 2022-11-22 RX ORDER — IBUPROFEN 200 MG
24 TABLET ORAL
Status: DISCONTINUED | OUTPATIENT
Start: 2022-11-22 | End: 2022-11-24 | Stop reason: HOSPADM

## 2022-11-22 RX ORDER — DIAZEPAM 5 MG/1
5 TABLET ORAL EVERY 12 HOURS
Status: DISCONTINUED | OUTPATIENT
Start: 2022-11-22 | End: 2022-11-22

## 2022-11-22 RX ORDER — LOSARTAN POTASSIUM 50 MG/1
100 TABLET ORAL DAILY
Status: DISCONTINUED | OUTPATIENT
Start: 2022-11-23 | End: 2022-11-24 | Stop reason: HOSPADM

## 2022-11-22 RX ORDER — FOLIC ACID 1 MG/1
1 TABLET ORAL DAILY
Status: DISCONTINUED | OUTPATIENT
Start: 2022-11-22 | End: 2022-11-24 | Stop reason: HOSPADM

## 2022-11-22 RX ORDER — DIAZEPAM 5 MG/1
5 TABLET ORAL EVERY 8 HOURS PRN
Status: DISCONTINUED | OUTPATIENT
Start: 2022-11-22 | End: 2022-11-22

## 2022-11-22 RX ORDER — IBUPROFEN 200 MG
16 TABLET ORAL
Status: DISCONTINUED | OUTPATIENT
Start: 2022-11-22 | End: 2022-11-24 | Stop reason: HOSPADM

## 2022-11-22 RX ADMIN — LOSARTAN POTASSIUM 50 MG: 50 TABLET, FILM COATED ORAL at 09:11

## 2022-11-22 RX ADMIN — ENOXAPARIN SODIUM 40 MG: 100 INJECTION SUBCUTANEOUS at 09:11

## 2022-11-22 RX ADMIN — Medication 100 MG: at 09:11

## 2022-11-22 RX ADMIN — THERA TABS 1 TABLET: TAB at 10:11

## 2022-11-22 RX ADMIN — ATORVASTATIN CALCIUM 80 MG: 40 TABLET, FILM COATED ORAL at 09:11

## 2022-11-22 RX ADMIN — HUMAN ALBUMIN MICROSPHERES AND PERFLUTREN 0.66 MG: 10; .22 INJECTION, SOLUTION INTRAVENOUS at 03:11

## 2022-11-22 RX ADMIN — QUETIAPINE FUMARATE 300 MG: 300 TABLET ORAL at 06:11

## 2022-11-22 RX ADMIN — PANTOPRAZOLE SODIUM 40 MG: 40 TABLET, DELAYED RELEASE ORAL at 09:11

## 2022-11-22 RX ADMIN — ONDANSETRON 4 MG: 2 INJECTION INTRAMUSCULAR; INTRAVENOUS at 03:11

## 2022-11-22 RX ADMIN — ONDANSETRON 4 MG: 2 INJECTION INTRAMUSCULAR; INTRAVENOUS at 09:11

## 2022-11-22 RX ADMIN — ACETAMINOPHEN 650 MG: 325 TABLET ORAL at 05:11

## 2022-11-22 RX ADMIN — QUETIAPINE FUMARATE 600 MG: 300 TABLET, EXTENDED RELEASE ORAL at 11:11

## 2022-11-22 RX ADMIN — CHLORDIAZEPOXIDE HYDROCHLORIDE 50 MG: 25 CAPSULE ORAL at 09:11

## 2022-11-22 RX ADMIN — CHLORDIAZEPOXIDE HYDROCHLORIDE 50 MG: 25 CAPSULE ORAL at 04:11

## 2022-11-22 RX ADMIN — CHLORDIAZEPOXIDE HYDROCHLORIDE 100 MG: 25 CAPSULE ORAL at 10:11

## 2022-11-22 RX ADMIN — FOLIC ACID 1 MG: 1 TABLET ORAL at 09:11

## 2022-11-22 RX ADMIN — IOHEXOL 100 ML: 350 INJECTION, SOLUTION INTRAVENOUS at 01:11

## 2022-11-22 NOTE — ED PROVIDER NOTES
"Encounter Date: 11/21/2022       History     Chief Complaint   Patient presents with    Withdrawal     Alcohol withdrawal, last drink of ETOH right before arrival. Prior pt abruptly stopped drinking for 24hrs. +auditory/visual hallucinations. Pt denies SI/HI or command hallucinations. Pt reports drinking up to 2/3 a gallon of liquor a day.      37-year-old male with past medical history of insomnia, bipolar 1 and alcohol use disorder presenting to ED with complaint of withdrawal symptoms.  He states that he drinks a gal of vodka with wine daily and had stopped drinking for 18 hours before beginning to experience anxiety, tremulousness, nausea.  He states that he took a "swig" of vodka prior to EMS transporting him to the hospital in order to help with his symptoms.  He reports that in the past he had an allergic reaction to Ativan in which he experienced back spasms but he has been able to tolerate other benzos.  He states he also has had his nightly dose of Seroquel increased.  He denies any history of withdrawal associated seizures.  He also endorses photophobia, headache, auditory hallucinations and states that it sounds as though he is in a football field with no distinct voices that he can make out.  He denies SI, HI, chest pain or shortness of breath.    Review of patient's allergies indicates:   Allergen Reactions    Ativan [lorazepam] Other (See Comments)     Muscle spasms in neck & back when taking medication.     Past Medical History:   Diagnosis Date    Hypertension     Insomnia      No past surgical history on file.  No family history on file.  Social History     Tobacco Use    Smoking status: Some Days     Types: Vaping with nicotine    Smokeless tobacco: Current     Types: Chew   Substance Use Topics    Alcohol use: Yes     Comment: reports "a lot" every night to go to sleep. will not report a number of drinks.    Drug use: No     Review of Systems   Constitutional:  Negative for chills and " fever.   HENT:  Negative for congestion and sore throat.    Eyes:  Positive for photophobia. Negative for pain, discharge and visual disturbance.   Respiratory:  Negative for shortness of breath and wheezing.    Cardiovascular:  Negative for chest pain and palpitations.   Gastrointestinal:  Positive for abdominal pain, nausea and vomiting.   Genitourinary:  Negative for difficulty urinating and dysuria.   Musculoskeletal:  Negative for back pain and joint swelling.   Skin:  Negative for color change and rash.   Neurological:  Positive for headaches. Negative for weakness and numbness.   Hematological:  Does not bruise/bleed easily.   Psychiatric/Behavioral:  Positive for agitation, hallucinations and sleep disturbance. Negative for suicidal ideas. The patient is nervous/anxious.      Physical Exam     Initial Vitals   BP Pulse Resp Temp SpO2   11/21/22 2038 11/21/22 2038 11/21/22 2042 11/21/22 2038 11/21/22 2038   (!) 167/95 104 16 99 °F (37.2 °C) (!) 94 %      MAP       --                Physical Exam    Nursing note and vitals reviewed.  Constitutional: He is not diaphoretic. No distress.   HENT:   Head: Normocephalic and atraumatic.   Mouth/Throat: Oropharynx is clear and moist.   Eyes: Conjunctivae and EOM are normal.   Neck: Neck supple.   Normal range of motion.  Cardiovascular:  Normal rate, regular rhythm, normal heart sounds and intact distal pulses.           Pulmonary/Chest: Breath sounds normal. He has no wheezes.   Diminished bilateral breath sounds   Abdominal: He exhibits no distension. There is abdominal tenderness.   Tenderness to palpation in epigastric, left upper quadrant and left lower quadrant.  Rotund abdomen with no peritoneal sign   Musculoskeletal:         General: No tenderness or edema. Normal range of motion.      Cervical back: Normal range of motion and neck supple.     Neurological: He is alert and oriented to person, place, and time. He has normal strength. No sensory deficit.    Mild-to-moderate tremors with arms fully extended   Skin: Skin is warm and dry. Capillary refill takes less than 2 seconds.   Psychiatric: He has a normal mood and affect. Thought content normal.       ED Course   Procedures  Labs Reviewed   CBC W/ AUTO DIFFERENTIAL - Abnormal; Notable for the following components:       Result Value    RBC 4.07 (*)     Hemoglobin 12.8 (*)     Hematocrit 38.8 (*)     MCH 31.4 (*)     RDW 18.4 (*)     Immature Granulocytes 0.7 (*)     All other components within normal limits   HIV 1 / 2 ANTIBODY   HEPATITIS C ANTIBODY   COMPREHENSIVE METABOLIC PANEL   MAGNESIUM   B-TYPE NATRIURETIC PEPTIDE   ALCOHOL,MEDICAL (ETHANOL)   DRUG SCREEN PANEL, URINE EMERGENCY   URINALYSIS, REFLEX TO URINE CULTURE          Imaging Results    None          Medications   sodium chloride 0.9% bolus 1,000 mL (1,000 mLs Intravenous New Bag 11/21/22 2245)   diazePAM tablet 10 mg (has no administration in time range)   ondansetron injection 4 mg (4 mg Intravenous Given 11/21/22 2247)     Medical Decision Making:   History:   Old Medical Records: I decided to obtain old medical records.  Differential Diagnosis:   ETOH withdrawal  Intoxication  Polysubstance use  Pancreatitis  Gastroenteritis  Diverticular disease  Clinical Tests:   Lab Tests: Ordered and Reviewed  Radiological Study: Ordered and Reviewed  ED Management:  CIWA score is 23.  Patient is hemodynamically stable though tachycardic.  10 mg of PO Valium given.  Patient also given Zofran and IV fluid bolus.  CT and lipase are negative for pancreatitis.  Patient given Tylenol for abdominal pain and headache.  Patient admitted to Hospital Medicine for further management of alcohol withdrawal.           Attending Attestation:   Physician Attestation Statement for Resident:  As the supervising MD   Physician Attestation Statement: I have personally seen and examined this patient.   I agree with the above history.  -:   As the supervising MD I agree with the  above PE.     As the supervising MD I agree with the above treatment, course, plan, and disposition.    I have reviewed and agree with the residents interpretation of the following: lab data, CT scans and EKG.  I have reviewed the following: old records at this facility.                           Clinical Impression:   Final diagnoses:  [R00.0] Tachycardia  [R09.02] Hypoxia  [F10.932] Alcohol withdrawal syndrome with perceptual disturbance (Primary)        ED Disposition Condition    Admit Stable                Renetta Means MD  Resident  11/22/22 3586       Frances Gil MD  11/22/22 3362

## 2022-11-22 NOTE — ASSESSMENT & PLAN NOTE
Unclear diagnosis per chart check, patient states he is to use CPAP at night, but has never done so.    -patient to consider outpatient sleep study on discharge

## 2022-11-22 NOTE — ASSESSMENT & PLAN NOTE
Heavy reliance on alcohol for sleep. Intake as high as 1 Gal vodka per day, recent decresae in intake to 1 5th of vodka daily. Last drink 8 hours prior to exam. CIWA score 23. Previous episode of withdrawal requiring ICU-precedex for agitation 7/2022    - CBC, CMP trending  - Vitals q4h while awake  - CIWA monitoring Q2h  - fall precautions, seizure precautions  - valium 5mg Q12  - PRN valium for breakthrough CIWA >8  - continue Thiamine, Folic acid

## 2022-11-22 NOTE — ASSESSMENT & PLAN NOTE
Patient has been struggling with insomnia for 6 months and has been self medicating with alcohol and xanax. Has found relief with seroquel 300mg nightly    -Continue Seroquel 600 QHS

## 2022-11-22 NOTE — ED NOTES
Assumed care of pt at this time.  RR even and unlabored. Resting in bed comfortably. No voiced compaints of pain or discomfort at this time. Safety protocols remain,.

## 2022-11-22 NOTE — ASSESSMENT & PLAN NOTE
Bilateral pitting edema of lower extremities for months, patient also requires multiple pillows propped behind back to be able to sleep.  Patient denies CP, SOB, dyspnea on exertion. Patient states worsened by prolonged sitting/standing.    While alcoholic cardiomyopathy not likely given short time frame of addiction, it may still be beneficial to obtain CHF workup.

## 2022-11-22 NOTE — ED NOTES
Pt identifiers Lola Wetzel were checked and are correct  LOC: The patient is awake, alert, aware of environment with an appropriate affect. Oriented x3, speaking appropriately  APPEARANCE: Pt rates upper abd pain and left side pain an 8/10, in no acute distress, pt is clean and well groomed, clothing properly fastened  SKIN: Skin warm, dry and intact, normal skin turgor, moist mucus membranes  RESPIRATORY: Airway is open and patent, respirations are spontaneous, even and unlabored, normal effort and rate  CARDIAC: Normal rate and rhythm, 1 + peripheral edema noted, capillary refill < 3 seconds, bilateral radial pulses 2+  ABDOMEN: Soft, nontender, nondistended. Bowel sounds present   NEUROLOGIC: PERRL, facial expression is symmetrical, patient moving all extremities spontaneously, normal sensation in all extremities when touched with a finger.  Follows all commands appropriately  MUSCULOSKELETAL: No obvious deformities.

## 2022-11-22 NOTE — SUBJECTIVE & OBJECTIVE
"Past Medical History:   Diagnosis Date    Hypertension     Insomnia        No past surgical history on file.    Review of patient's allergies indicates:   Allergen Reactions    Ativan [lorazepam] Other (See Comments)     Muscle spasms in neck & back when taking medication.       No current facility-administered medications on file prior to encounter.     Current Outpatient Medications on File Prior to Encounter   Medication Sig    atorvastatin (LIPITOR) 80 MG tablet Take 1 tablet (80 mg total) by mouth once daily.    dextroamphetamine-amphetamine (ADDERALL XR) 20 MG 24 hr capsule Take by mouth 2 (two) times daily.    fenofibrate 160 MG Tab Take 1 tablet (160 mg total) by mouth once daily.    folic acid (FOLVITE) 1 MG tablet Take 1 tablet (1 mg total) by mouth once daily.    multivitamin (THEREMS) tablet Take 1 tablet by mouth once daily.    pantoprazole (PROTONIX) 40 MG tablet Take 1 tablet (40 mg total) by mouth once daily.    QUEtiapine (SEROQUEL) 300 MG Tab Take 1 tablet (300 mg total) by mouth nightly. for 10 days    sertraline (ZOLOFT) 50 MG tablet Take 1 tablet (50 mg total) by mouth once daily.    thiamine 100 MG tablet Take 1 tablet (100 mg total) by mouth once daily.     Family History    None       Tobacco Use    Smoking status: Some Days     Types: Vaping with nicotine    Smokeless tobacco: Current     Types: Chew   Substance and Sexual Activity    Alcohol use: Yes     Comment: reports "a lot" every night to go to sleep. will not report a number of drinks.    Drug use: No    Sexual activity: Yes     Review of Systems   Constitutional:  Positive for chills. Negative for fever.   HENT:  Negative for rhinorrhea, sore throat and trouble swallowing.    Eyes:  Positive for photophobia and redness. Negative for visual disturbance.   Respiratory:  Negative for cough, chest tightness, shortness of breath and wheezing.    Cardiovascular:  Positive for leg swelling. Negative for chest pain and palpitations. "   Gastrointestinal:  Positive for abdominal pain, nausea and vomiting. Negative for blood in stool and diarrhea.   Genitourinary:  Negative for dysuria and hematuria.   Musculoskeletal:  Negative for back pain and neck pain.   Skin:  Negative for color change and pallor.   Neurological:  Positive for tremors and headaches. Negative for seizures.   Psychiatric/Behavioral:  Positive for hallucinations and sleep disturbance. Negative for agitation and suicidal ideas.    Objective:     Vital Signs (Most Recent):  Temp: 99.1 °F (37.3 °C) (11/22/22 0238)  Pulse: 105 (11/22/22 0238)  Resp: 16 (11/22/22 0010)  BP: (!) 181/91 (11/22/22 0238)  SpO2: 97 % (11/22/22 0238)   Vital Signs (24h Range):  Temp:  [99 °F (37.2 °C)-99.1 °F (37.3 °C)] 99.1 °F (37.3 °C)  Pulse:  [102-110] 105  Resp:  [16-18] 16  SpO2:  [94 %-97 %] 97 %  BP: (149-181)/(80-97) 181/91     Weight: (!) 163.3 kg (360 lb)  Body mass index is 45 kg/m².    Physical Exam  Constitutional:       General: He is not in acute distress.     Appearance: Normal appearance. He is obese.   HENT:      Head: Normocephalic and atraumatic.      Right Ear: External ear normal.      Left Ear: External ear normal.   Eyes:      General: No scleral icterus.        Right eye: No discharge.         Left eye: No discharge.      Extraocular Movements: Extraocular movements intact.      Pupils: Pupils are equal, round, and reactive to light.   Cardiovascular:      Rate and Rhythm: Tachycardia present.      Heart sounds: No murmur heard.  Pulmonary:      Effort: Pulmonary effort is normal. No respiratory distress.      Breath sounds: Normal breath sounds.   Abdominal:      General: There is distension.      Tenderness: There is abdominal tenderness.   Musculoskeletal:      Cervical back: Normal range of motion. No rigidity.      Right lower leg: Edema present.      Left lower leg: Edema present.   Skin:     Coloration: Skin is not jaundiced.      Findings: No bruising.   Neurological:       General: No focal deficit present.      Mental Status: He is alert and oriented to person, place, and time.   Psychiatric:         Mood and Affect: Mood normal.         Behavior: Behavior normal.         CRANIAL NERVES     CN III, IV, VI   Pupils are equal, round, and reactive to light.     Significant Labs: All pertinent labs within the past 24 hours have been reviewed.  CBC:   Recent Labs   Lab 11/21/22  2220   WBC 5.69   HGB 12.8*   HCT 38.8*        CMP:   Recent Labs   Lab 11/21/22  2220      K 3.7   CL 99   CO2 25      BUN 8   CREATININE 0.9   CALCIUM 9.1   PROT 7.8   ALBUMIN 3.5   BILITOT 0.4   ALKPHOS 161*   *   ALT 87*   ANIONGAP 14       Significant Imaging: I have reviewed all pertinent imaging results/findings within the past 24 hours.

## 2022-11-22 NOTE — HPI
Damari is a 38 yo M with Insomnia, HTN, Bipolar I disorder, sleep apnea non-compliant with CPAP, alcohol dependence, hx of acute pancreatitis in 7/2022 requiring ICU stay 2/2 sever agitation presenting with withdrawal symptoms. Patient has been self medicating his insomnia for 5-6 months with alcohol and xanax. Following his ICU stay for acute pancreatitis, he has been tapering his alcohol usage and has ceased taking xanax. He used to drink 1 gallon of vodka with some wine a day, but has reduced intake to 1 5th of vodka a day. He states he tried going to bed Sunday night without alcohol, and woke up feeling nauseated, tremulous, and auditory hallucinating (sounds of stadium/crowd, no discernable voices). Around 7pm on day of presentation, patient felt abdominal pain similar to his pancreatitis episode, which prompted him to call p11, he subsequently drank 1 5th of vodka before EMS picked him up. Patient had 1 episode of NBNB vomit on arrival to ED. Upon questioning patient has also been having SOB with lying flat, requiring 2 pillows propped behind back for relief. Patient also has pitting edema on exam and family history of CHF. Other presenting symptoms include HA, Photophobia, chills, diaphoresis. Patient denies fever, CP, SOB, HI, SI, hematochezia, dysuria, seizure.    In ED, Tachycardic, /95, CMP wnl, CBC with mild normocytic anemia, BNP <10, ETOH 62, UA with trace proteinuria. Given 1 L LR, tylenol, 10 mg valium.    CT with No acute intra-abdominopelvic abnormality.  2. Interval resolution of previously described retroperitoneal soft tissue stranding edema near the pancreas.  No CT findings consistent with acute or chronic pancreatitis.  3. Hepatomegaly with steatosis.  4. Appendix is normal in size with small appendicolith at the tip, similar to prior. No periappendiceal inflammatory changes.    CXR: No acute findings in the chest.    EKG sinus tach, no ST/T wave abnormalities

## 2022-11-22 NOTE — PROVIDER PROGRESS NOTES - EMERGENCY DEPT.
Encounter Date: 11/21/2022    ED Physician Progress Notes         EKG - STEMI Decision  Initial Reading: No STEMI present.  Response: No Action Needed.

## 2022-11-22 NOTE — ED NOTES
"Pt arrives via EMS from home with complaint of alcohol withdrawal symptoms. Pt c/o auditory hallucinations described as "sounds like I am in a stadium" but reports it is intermittent and less noticeable with a lot of stimulation around him. Pt c/o visual hallucinations described as "disorientation" but also intermittent. Pt c/o nausea with one episode of emesis here, upper right and left abdominal pain. Pt reports drinking up to 2/3 a gallon of alcohol a day and abruptly stopped last night. Pt reported having "one drink" prior to EMS arrival. Pt reports hx of detox. Pt speaking in clear sentences, ambulatory with steady gait and able to act independently.   "

## 2022-11-22 NOTE — PHARMACY MED REC
"Admission Medication History     The home medication history was taken by Cyndee Silva.    You may go to "Admission" then "Reconcile Home Medications" tabs to review and/or act upon these items.     The home medication list has been updated by the Pharmacy department.   Please read ALL comments highlighted in yellow.   Please address this information as you see fit.    Feel free to contact us if you have any questions or require assistance.      The medications listed below were removed from the home medication list. Please reorder if appropriate:  DEXTROAMPHETAMINE-AMPHETAMINE 20 MG  FENOFIBRATE 160 MG  FOLIC ACID 1 MG  MTV  PANTOPRAZOLE 40 MG  SERTRALINE 50 MG  THIAMINE 100 MG      Medications listed below were obtained from: [employer] and Patient's pharmacy  Current Outpatient Medications on File Prior to Encounter   Medication Sig    atorvastatin (LIPITOR) 80 MG tablet Take 1 tablet (80 mg total) by mouth once daily.    QUEtiapine (SEROQUEL XR) 300 MG Tb24 Take 600 mg by mouth every evening.    losartan (COZAAR) 50 MG tablet Take 50 mg by mouth once daily.             Cyndee Silva  EXT [id number]                  .        "

## 2022-11-22 NOTE — ASSESSMENT & PLAN NOTE
History of acute pancreatitis  Lipase wnl  Epigastric tenderness on exam  CT abd/pelv with no acute intra-abdominopelvic abnormality.Interval resolution of previously described retroperitoneal soft tissue stranding edema near the pancreas.  No CT findings consistent with acute or chronic pancreatitis.    - continue to monitor  - Pain management with tylenol  - caution with opioid use in setting of benzo

## 2022-11-23 PROBLEM — E66.01 MORBID OBESITY: Status: ACTIVE | Noted: 2022-11-23

## 2022-11-23 LAB
ALBUMIN SERPL BCP-MCNC: 3.2 G/DL (ref 3.5–5.2)
ALP SERPL-CCNC: 141 U/L (ref 55–135)
ALT SERPL W/O P-5'-P-CCNC: 73 U/L (ref 10–44)
ANION GAP SERPL CALC-SCNC: 10 MMOL/L (ref 8–16)
AST SERPL-CCNC: 89 U/L (ref 10–40)
BASOPHILS # BLD AUTO: 0.07 K/UL (ref 0–0.2)
BASOPHILS NFR BLD: 1.1 % (ref 0–1.9)
BILIRUB SERPL-MCNC: 0.8 MG/DL (ref 0.1–1)
BUN SERPL-MCNC: 12 MG/DL (ref 6–20)
CALCIUM SERPL-MCNC: 8.9 MG/DL (ref 8.7–10.5)
CHLORIDE SERPL-SCNC: 102 MMOL/L (ref 95–110)
CO2 SERPL-SCNC: 23 MMOL/L (ref 23–29)
CREAT SERPL-MCNC: 1.2 MG/DL (ref 0.5–1.4)
DIFFERENTIAL METHOD: ABNORMAL
EOSINOPHIL # BLD AUTO: 0.1 K/UL (ref 0–0.5)
EOSINOPHIL NFR BLD: 1.8 % (ref 0–8)
ERYTHROCYTE [DISTWIDTH] IN BLOOD BY AUTOMATED COUNT: 18 % (ref 11.5–14.5)
EST. GFR  (NO RACE VARIABLE): >60 ML/MIN/1.73 M^2
GLUCOSE SERPL-MCNC: 107 MG/DL (ref 70–110)
HCT VFR BLD AUTO: 36.6 % (ref 40–54)
HGB BLD-MCNC: 11.9 G/DL (ref 14–18)
IMM GRANULOCYTES # BLD AUTO: 0.04 K/UL (ref 0–0.04)
IMM GRANULOCYTES NFR BLD AUTO: 0.6 % (ref 0–0.5)
LYMPHOCYTES # BLD AUTO: 1.3 K/UL (ref 1–4.8)
LYMPHOCYTES NFR BLD: 19.8 % (ref 18–48)
MAGNESIUM SERPL-MCNC: 1.9 MG/DL (ref 1.6–2.6)
MCH RBC QN AUTO: 31.6 PG (ref 27–31)
MCHC RBC AUTO-ENTMCNC: 32.5 G/DL (ref 32–36)
MCV RBC AUTO: 97 FL (ref 82–98)
MONOCYTES # BLD AUTO: 0.5 K/UL (ref 0.3–1)
MONOCYTES NFR BLD: 6.8 % (ref 4–15)
NEUTROPHILS # BLD AUTO: 4.6 K/UL (ref 1.8–7.7)
NEUTROPHILS NFR BLD: 69.9 % (ref 38–73)
NRBC BLD-RTO: 0 /100 WBC
PHOSPHATE SERPL-MCNC: 3.9 MG/DL (ref 2.7–4.5)
PLATELET # BLD AUTO: 196 K/UL (ref 150–450)
PMV BLD AUTO: 10.2 FL (ref 9.2–12.9)
POTASSIUM SERPL-SCNC: 4 MMOL/L (ref 3.5–5.1)
PROT SERPL-MCNC: 7.1 G/DL (ref 6–8.4)
RBC # BLD AUTO: 3.76 M/UL (ref 4.6–6.2)
SODIUM SERPL-SCNC: 135 MMOL/L (ref 136–145)
WBC # BLD AUTO: 6.6 K/UL (ref 3.9–12.7)

## 2022-11-23 PROCEDURE — 63600175 PHARM REV CODE 636 W HCPCS: Performed by: STUDENT IN AN ORGANIZED HEALTH CARE EDUCATION/TRAINING PROGRAM

## 2022-11-23 PROCEDURE — 36415 COLL VENOUS BLD VENIPUNCTURE: CPT

## 2022-11-23 PROCEDURE — 85025 COMPLETE CBC W/AUTO DIFF WBC: CPT

## 2022-11-23 PROCEDURE — 63600175 PHARM REV CODE 636 W HCPCS

## 2022-11-23 PROCEDURE — 11000001 HC ACUTE MED/SURG PRIVATE ROOM

## 2022-11-23 PROCEDURE — 84100 ASSAY OF PHOSPHORUS: CPT

## 2022-11-23 PROCEDURE — 99233 PR SUBSEQUENT HOSPITAL CARE,LEVL III: ICD-10-PCS | Mod: ,,, | Performed by: STUDENT IN AN ORGANIZED HEALTH CARE EDUCATION/TRAINING PROGRAM

## 2022-11-23 PROCEDURE — 83735 ASSAY OF MAGNESIUM: CPT

## 2022-11-23 PROCEDURE — 25000003 PHARM REV CODE 250: Performed by: STUDENT IN AN ORGANIZED HEALTH CARE EDUCATION/TRAINING PROGRAM

## 2022-11-23 PROCEDURE — 25000003 PHARM REV CODE 250

## 2022-11-23 PROCEDURE — 99233 SBSQ HOSP IP/OBS HIGH 50: CPT | Mod: ,,, | Performed by: STUDENT IN AN ORGANIZED HEALTH CARE EDUCATION/TRAINING PROGRAM

## 2022-11-23 PROCEDURE — 80053 COMPREHEN METABOLIC PANEL: CPT

## 2022-11-23 RX ORDER — PROCHLORPERAZINE EDISYLATE 5 MG/ML
2.5 INJECTION INTRAMUSCULAR; INTRAVENOUS EVERY 6 HOURS PRN
Status: DISCONTINUED | OUTPATIENT
Start: 2022-11-23 | End: 2022-11-24

## 2022-11-23 RX ORDER — LIDOCAINE HYDROCHLORIDE 20 MG/ML
15 SOLUTION OROPHARYNGEAL ONCE
Status: COMPLETED | OUTPATIENT
Start: 2022-11-24 | End: 2022-11-23

## 2022-11-23 RX ORDER — CALCIUM CARBONATE 200(500)MG
1000 TABLET,CHEWABLE ORAL 3 TIMES DAILY PRN
Status: DISCONTINUED | OUTPATIENT
Start: 2022-11-23 | End: 2022-11-24 | Stop reason: HOSPADM

## 2022-11-23 RX ORDER — MAG HYDROX/ALUMINUM HYD/SIMETH 200-200-20
30 SUSPENSION, ORAL (FINAL DOSE FORM) ORAL ONCE
Status: COMPLETED | OUTPATIENT
Start: 2022-11-24 | End: 2022-11-23

## 2022-11-23 RX ORDER — FUROSEMIDE 10 MG/ML
40 INJECTION INTRAMUSCULAR; INTRAVENOUS ONCE
Status: DISCONTINUED | OUTPATIENT
Start: 2022-11-23 | End: 2022-11-24 | Stop reason: HOSPADM

## 2022-11-23 RX ORDER — ACETAMINOPHEN 500 MG
1000 TABLET ORAL EVERY 6 HOURS PRN
Status: DISCONTINUED | OUTPATIENT
Start: 2022-11-23 | End: 2022-11-24 | Stop reason: HOSPADM

## 2022-11-23 RX ADMIN — THERA TABS 1 TABLET: TAB at 08:11

## 2022-11-23 RX ADMIN — CHLORDIAZEPOXIDE HYDROCHLORIDE 50 MG: 25 CAPSULE ORAL at 04:11

## 2022-11-23 RX ADMIN — ENOXAPARIN SODIUM 40 MG: 100 INJECTION SUBCUTANEOUS at 08:11

## 2022-11-23 RX ADMIN — ONDANSETRON 4 MG: 2 INJECTION INTRAMUSCULAR; INTRAVENOUS at 01:11

## 2022-11-23 RX ADMIN — ATORVASTATIN CALCIUM 80 MG: 40 TABLET, FILM COATED ORAL at 08:11

## 2022-11-23 RX ADMIN — PANTOPRAZOLE SODIUM 40 MG: 40 TABLET, DELAYED RELEASE ORAL at 08:11

## 2022-11-23 RX ADMIN — PROCHLORPERAZINE EDISYLATE 2.5 MG: 5 INJECTION INTRAMUSCULAR; INTRAVENOUS at 12:11

## 2022-11-23 RX ADMIN — ACETAMINOPHEN 1000 MG: 500 TABLET ORAL at 09:11

## 2022-11-23 RX ADMIN — CHLORDIAZEPOXIDE HYDROCHLORIDE 50 MG: 25 CAPSULE ORAL at 11:11

## 2022-11-23 RX ADMIN — QUETIAPINE FUMARATE 600 MG: 300 TABLET, EXTENDED RELEASE ORAL at 09:11

## 2022-11-23 RX ADMIN — LOSARTAN POTASSIUM 100 MG: 50 TABLET, FILM COATED ORAL at 08:11

## 2022-11-23 RX ADMIN — ALUMINUM HYDROXIDE, MAGNESIUM HYDROXIDE, AND SIMETHICONE 30 ML: 200; 200; 20 SUSPENSION ORAL at 11:11

## 2022-11-23 RX ADMIN — FOLIC ACID 1 MG: 1 TABLET ORAL at 08:11

## 2022-11-23 RX ADMIN — PROCHLORPERAZINE EDISYLATE 2.5 MG: 5 INJECTION INTRAMUSCULAR; INTRAVENOUS at 05:11

## 2022-11-23 RX ADMIN — LIDOCAINE HYDROCHLORIDE 15 ML: 20 SOLUTION ORAL; TOPICAL at 11:11

## 2022-11-23 RX ADMIN — Medication 100 MG: at 08:11

## 2022-11-23 RX ADMIN — ENOXAPARIN SODIUM 40 MG: 100 INJECTION SUBCUTANEOUS at 09:11

## 2022-11-23 NOTE — HOSPITAL COURSE
Admitted for alcohol withdrawal.  Started on librium taper. Withdrawal symptoms improved. Echo with EF50%, difficult windows and IVC not assessed.  Patient refused CPAP for RAKESH. Patient had post-prandial abdominal pain, improved with GI cocktail and maalox. Did not want to pursue rehab at this time. Discussed attending AA meetings which patient will attempt. To f/u with psychiatry and bariatric surgery outpatient.

## 2022-11-23 NOTE — SUBJECTIVE & OBJECTIVE
Interval History: naeon.  Mildly hypertensive, increasing losartan.  Denies any withdrawal symptoms this morning, denies anxiety, no tremors.      Review of Systems   Constitutional:  Positive for chills. Negative for fever.   HENT:  Negative for rhinorrhea, sore throat and trouble swallowing.    Eyes:  Positive for photophobia and redness. Negative for visual disturbance.   Respiratory:  Negative for cough, chest tightness, shortness of breath and wheezing.    Cardiovascular:  Positive for leg swelling. Negative for chest pain and palpitations.   Gastrointestinal:  Positive for abdominal pain, nausea and vomiting. Negative for blood in stool and diarrhea.   Genitourinary:  Negative for dysuria and hematuria.   Musculoskeletal:  Negative for back pain and neck pain.   Skin:  Negative for color change and pallor.   Neurological:  Positive for tremors and headaches. Negative for seizures.   Psychiatric/Behavioral:  Positive for hallucinations and sleep disturbance. Negative for agitation and suicidal ideas.    Objective:     Vital Signs (Most Recent):  Temp: 98.9 °F (37.2 °C) (11/23/22 1120)  Pulse: 99 (11/23/22 1120)  Resp: 20 (11/23/22 1120)  BP: (!) 148/69 (11/23/22 1120)  SpO2: (!) 92 % (11/23/22 1120)   Vital Signs (24h Range):  Temp:  [97.7 °F (36.5 °C)-99.6 °F (37.6 °C)] 98.9 °F (37.2 °C)  Pulse:  [] 99  Resp:  [16-20] 20  SpO2:  [92 %-97 %] 92 %  BP: (147-178)/() 148/69     Weight: (!) 163.3 kg (360 lb)  Body mass index is 45 kg/m².  No intake or output data in the 24 hours ending 11/23/22 1235   Physical Exam  Constitutional:       General: He is not in acute distress.     Appearance: Normal appearance. He is obese.   HENT:      Head: Normocephalic and atraumatic.      Right Ear: External ear normal.      Left Ear: External ear normal.   Eyes:      General: No scleral icterus.        Right eye: No discharge.         Left eye: No discharge.      Extraocular Movements: Extraocular movements intact.       Pupils: Pupils are equal, round, and reactive to light.   Cardiovascular:      Rate and Rhythm: Tachycardia present.      Heart sounds: No murmur heard.  Pulmonary:      Effort: Pulmonary effort is normal. No respiratory distress.      Breath sounds: Normal breath sounds.   Abdominal:      General: There is distension.      Tenderness: There is abdominal tenderness.   Musculoskeletal:      Cervical back: Normal range of motion. No rigidity.      Right lower leg: Edema present.      Left lower leg: Edema present.   Skin:     Coloration: Skin is not jaundiced.      Findings: No bruising.   Neurological:      General: No focal deficit present.      Mental Status: He is alert and oriented to person, place, and time.   Psychiatric:         Mood and Affect: Mood normal.         Behavior: Behavior normal.       Significant Labs: All pertinent labs within the past 24 hours have been reviewed.    Significant Imaging: I have reviewed all pertinent imaging results/findings within the past 24 hours.

## 2022-11-23 NOTE — ED NOTES
Telemetry Verification   Patient placed on Telemetry Box  Verified with War Room  Box # [id number]   Monitor Tech Annette   Rate 102   Rhythm Sinus tach

## 2022-11-23 NOTE — ASSESSMENT & PLAN NOTE
Heavy reliance on alcohol for sleep. Intake as high as 1 Gal vodka per day, recent decresae in intake to 1 5th of vodka daily. Last drink 8 hours prior to exam. CIWA score 23. Previous episode of withdrawal requiring ICU-precedex for agitation 7/2022    - CBC, CMP trending  - Vitals q4h while awake  - CIWA monitoring Q2h  - fall precautions, seizure precautions  - librium taper  - PRN librium for breakthrough CIWA >8  - continue Thiamine, Folic acid

## 2022-11-23 NOTE — ASSESSMENT & PLAN NOTE
Bilateral pitting edema of lower extremities for months, patient also requires multiple pillows propped behind back to be able to sleep.  Patient denies CP, SOB, dyspnea on exertion. Patient states worsened by prolonged sitting/standing.    While alcoholic cardiomyopathy not likely given short time frame of addiction, it may still be beneficial to obtain CHF workup.    - echo with EF 50%, no LVDD, IVC not able to be visualized  - lasix 40 mg iv once

## 2022-11-23 NOTE — PROGRESS NOTES
[facility] Medicine  Progress Note    Patient Name: Lola Wetzel  MRN: [id number]  Patient Class: IP- Inpatient   Admission Date: 11/21/2022  Length of Stay: 1 days  Attending Physician: Roderick Connell MD  Primary Care Provider: Janet Alaniz DO        Subjective:     Principal Problem:Alcohol dependence with withdrawal        HPI:  Damari is a 36 yo M with Insomnia, HTN, Bipolar I disorder, sleep apnea non-compliant with CPAP, alcohol dependence, hx of acute pancreatitis in 7/2022 requiring ICU stay 2/2 sever agitation presenting with withdrawal symptoms. Patient has been self medicating his insomnia for 5-6 months with alcohol and xanax. Following his ICU stay for acute pancreatitis, he has been tapering his alcohol usage and has ceased taking xanax. He used to drink 1 gallon of vodka with some wine a day, but has reduced intake to 1 5th of vodka a day. He states he tried going to bed Sunday night without alcohol, and woke up feeling nauseated, tremulous, and auditory hallucinating (sounds of stadium/crowd, no discernable voices). Around 7pm on day of presentation, patient felt abdominal pain similar to his pancreatitis episode, which prompted him to call p11, he subsequently drank 1 5th of vodka before EMS picked him up. Patient had 1 episode of NBNB vomit on arrival to ED. Upon questioning patient has also been having SOB with lying flat, requiring 2 pillows propped behind back for relief. Patient also has pitting edema on exam and family history of CHF. Other presenting symptoms include HA, Photophobia, chills, diaphoresis. Patient denies fever, CP, SOB, HI, SI, hematochezia, dysuria, seizure.    In ED, Tachycardic, /95, CMP wnl, CBC with mild normocytic anemia, BNP <10, ETOH 62, UA with trace proteinuria. Given 1 L LR, tylenol, 10 mg valium.    CT with No acute intra-abdominopelvic abnormality.  2. Interval resolution of previously described retroperitoneal soft tissue  stranding edema near the pancreas.  No CT findings consistent with acute or chronic pancreatitis.  3. Hepatomegaly with steatosis.  4. Appendix is normal in size with small appendicolith at the tip, similar to prior. No periappendiceal inflammatory changes.    CXR: No acute findings in the chest.    EKG sinus tach, no ST/T wave abnormalities      Overview/Hospital Course:  Admitted for alcohol withdrawal.  Started on librium taper.  Echo with EF50%, difficult windows and IVC not assessed.  Patient refusing CPAP.        Interval History: naeon.  Mildly hypertensive, increasing losartan.  Denies any withdrawal symptoms this morning, denies anxiety, no tremors.      Review of Systems   Constitutional:  Positive for chills. Negative for fever.   HENT:  Negative for rhinorrhea, sore throat and trouble swallowing.    Eyes:  Positive for photophobia and redness. Negative for visual disturbance.   Respiratory:  Negative for cough, chest tightness, shortness of breath and wheezing.    Cardiovascular:  Positive for leg swelling. Negative for chest pain and palpitations.   Gastrointestinal:  Positive for abdominal pain, nausea and vomiting. Negative for blood in stool and diarrhea.   Genitourinary:  Negative for dysuria and hematuria.   Musculoskeletal:  Negative for back pain and neck pain.   Skin:  Negative for color change and pallor.   Neurological:  Positive for tremors and headaches. Negative for seizures.   Psychiatric/Behavioral:  Positive for hallucinations and sleep disturbance. Negative for agitation and suicidal ideas.    Objective:     Vital Signs (Most Recent):  Temp: 98.9 °F (37.2 °C) (11/23/22 1120)  Pulse: 99 (11/23/22 1120)  Resp: 20 (11/23/22 1120)  BP: (!) 148/69 (11/23/22 1120)  SpO2: (!) 92 % (11/23/22 1120)   Vital Signs (24h Range):  Temp:  [97.7 °F (36.5 °C)-99.6 °F (37.6 °C)] 98.9 °F (37.2 °C)  Pulse:  [] 99  Resp:  [16-20] 20  SpO2:  [92 %-97 %] 92 %  BP: (147-178)/() 148/69     Weight:  (!) 163.3 kg (360 lb)  Body mass index is 45 kg/m².  No intake or output data in the 24 hours ending 11/23/22 1235   Physical Exam  Constitutional:       General: He is not in acute distress.     Appearance: Normal appearance. He is obese.   HENT:      Head: Normocephalic and atraumatic.      Right Ear: External ear normal.      Left Ear: External ear normal.   Eyes:      General: No scleral icterus.        Right eye: No discharge.         Left eye: No discharge.      Extraocular Movements: Extraocular movements intact.      Pupils: Pupils are equal, round, and reactive to light.   Cardiovascular:      Rate and Rhythm: Tachycardia present.      Heart sounds: No murmur heard.  Pulmonary:      Effort: Pulmonary effort is normal. No respiratory distress.      Breath sounds: Normal breath sounds.   Abdominal:      General: There is distension.      Tenderness: There is abdominal tenderness.   Musculoskeletal:      Cervical back: Normal range of motion. No rigidity.      Right lower leg: Edema present.      Left lower leg: Edema present.   Skin:     Coloration: Skin is not jaundiced.      Findings: No bruising.   Neurological:      General: No focal deficit present.      Mental Status: He is alert and oriented to person, place, and time.   Psychiatric:         Mood and Affect: Mood normal.         Behavior: Behavior normal.       Significant Labs: All pertinent labs within the past 24 hours have been reviewed.    Significant Imaging: I have reviewed all pertinent imaging results/findings within the past 24 hours.      Assessment/Plan:      * Alcohol dependence with withdrawal  Heavy reliance on alcohol for sleep. Intake as high as 1 Gal vodka per day, recent decresae in intake to 1 5th of vodka daily. Last drink 8 hours prior to exam. CIWA score 23. Previous episode of withdrawal requiring ICU-precedex for agitation 7/2022    - CBC, CMP trending  - Vitals q4h while awake  - CIWA monitoring Q2h  - fall precautions,  seizure precautions  - librium taper  - PRN librium for breakthrough CIWA >8  - continue Thiamine, Folic acid     Hypertension  /95 (104)  On home losartan 50mg daily    -increase losartan    Lower extremity edema  Bilateral pitting edema of lower extremities for months, patient also requires multiple pillows propped behind back to be able to sleep.  Patient denies CP, SOB, dyspnea on exertion. Patient states worsened by prolonged sitting/standing.    While alcoholic cardiomyopathy not likely given short time frame of addiction, it may still be beneficial to obtain CHF workup.    - echo with EF 50%, no LVDD, IVC not able to be visualized  - lasix 40 mg iv once    Sleep apnea  Unclear diagnosis per chart check, patient states he is to use CPAP at night, but has never done so.    -patient to consider outpatient sleep study on discharge    Abdominal pain  History of acute pancreatitis  Lipase wnl  Epigastric tenderness on exam  CT abd/pelv with no acute intra-abdominopelvic abnormality.Interval resolution of previously described retroperitoneal soft tissue stranding edema near the pancreas.  No CT findings consistent with acute or chronic pancreatitis.    - continue to monitor  - Pain management with tylenol  - caution with opioid use in setting of benzo    Insomnia  Patient has been struggling with insomnia for 6 months and has been self medicating with alcohol and xanax. Has found relief with seroquel 300mg nightly    -Continue Seroquel 600 QHS    Bipolar affective disorder in remission  Unclear diagnosis per chart check, not currently on pharmacologic treatment.        VTE Risk Mitigation (From admission, onward)         Ordered     enoxaparin injection 40 mg  Every 12 hours         11/22/22 0322     IP VTE HIGH RISK PATIENT  Once         11/22/22 0322     Place sequential compression device  Until discontinued         11/22/22 0322                Discharge Planning   MATY: 11/26/2022     Code Status: Full Code    Is the patient medically ready for discharge?: No    Reason for patient still in hospital (select all that apply): Treatment                     William Turner MD  Department of Hospital Medicine   [facility]

## 2022-11-23 NOTE — CONSULTS
"ADDICTION PSYCHIATRY CONSULT - PLAN OF CARE    This patient is a 36yo M who presented voluntarily via EMS complaining of symptoms of alcohol withdrawal. Addiction Psychiatry was consulted for "addiction," however patient refused evaluation on multiple attempts today.     When our team evaluated the patient, he denied withdrawal symptoms with scheduled benzodiazepines. On chart review, he has a history of bipolar disorder and reports taking Seroquel 300mg QHS at home. We agree with Librium taper and PRN for breakthrough withdrawal symptoms, plus continuing patient's home Seroquel in the hospital.     We recommend inpatient rehab for this patient with physiologic dependence and have provided Addiction resources in his Discharge Instructions. If he becomes amenable to evaluation by Psychiatry, please re-consult. Feel free to reach out with questions/concerns.      Nayana Espinoza MD, MPH  [facility], PGY-II    "

## 2022-11-23 NOTE — PLAN OF CARE
[facility]  Discharge Assessment    Primary Care Provider: Janet Alaniz DO     Discharge Assessment (most recent)       BRIEF DISCHARGE ASSESSMENT - 11/23/22 1220          Discharge Planning    Assessment Type Discharge Planning Brief Assessment     Resource/Environmental Concerns none     Equipment Currently Used at Home none     Current Living Arrangements home/apartment/condo     Patient/Family Anticipates Transition to inpatient rehabilitation facility   Voluntary admission to OP recovery program.    Patient/Family Anticipated Services at Transition none     DME Needed Upon Discharge  none     Discharge Plan A Other   IPR-Voluntary for Recovery    Discharge Plan B Home                   Recovery/addiction information provided. Patient to make a decision & call for voluntary admission to program upon discharge. Patients denies any other DCP/CM needs @ this time. CM following.    Ronda Ross RN CM  Case Management  [phone]

## 2022-11-23 NOTE — DISCHARGE INSTRUCTIONS
REFERRAL RECOMMENDATIONS FOR SUBSTANCE ABUSE & MENTAL HEALTH      IN CASE OF SUICIDAL THINKING, call the National Suicide Hotline Number: 988    988 Suicide & Crisis Lifeline: 980 , 5-415-001-VKUO (8281)  https://988[employer].EdSurge       SUBSTANCE ABUSE:     [facility] (formerly known as the ABU)  [x] [phone], Option 2  [x] [address] [facility] 4th [facility], [place] [address]  [x] [email or web address]  [x] The [facility] delivers comprehensive and collaborative treatment for alcohol and substance use disorders.  Excellent program for working professionals or anyone else seeking recovery.  [x] Requires insurance approval prior to starting program, call number above for more information.  [x] Intensive Outpatient Rehabilitation Program - M-F 9am-3pm - daily groups with psychologists and social workers, sessions with MDs 3x per week   [x] Ambulatory detox and dual diagnosis available      SUBOXONE:     NOTE: some Suboxone clinics require their clients to participate in a structured program (such as an IOP) in order to be prescribed Suboxone.  Some clinics have a long waiting list.  Most of these clinics do not accept walk-in clients, so call first to to learn what must be done to get started on Suboxone.    [facility] - [phone] (can do Sublocade)  [address] [facility], [place] [address]    [address]  [phone]    [facility] - [phone] (can do Sublocade)  [address] [place] [address]    [facility]  [address] [place]  [phone]     [facility] (very short waiting list, may accept some walk-in's but call first if possible)  [address], [place] [address]  [phone]; [phone]    [facility]   [address] [place]    [phone]    [facility] (can usually be seen within a week but is cash only  for appointment)  [address] [facility])  [address] [place], [place]  [phone]    [facility] ([facility])  [address] [facility] [address]  [phone]    [place], [place]:    [facility] - [address] [place] [address] - Tel: [phone]    Jey Julianna - [address] [place] [address] - Tel: [phone]    Juan Carlos Harding - [address] [place] [place] [address] - Tel: [phone]    Juan Garcia - [address] [place] [place] [address] - Tel: [phone]    Julio Song - [address] [place] [address] - Tel: [phone]    [place], [place]:     Dr. Becky Gonzáles and Dr. Mike Ng - [address] [place], [place] - Tel: [phone]    Dr. Maria L Corral - [address] [facility] [place], [place] - Tel: [phone]    Dr. Lius Rodrigues - Tel: [phone]    Dr. Perfecto Mejia - [facility] - [phone]      METHADONE:     [facility] (the only methadone clinic in the [facility], has two locations)  [x] [place] - [address] [place] [address], [phone]  [x] [facility] [place] [address], [phone]      12 STEP PROGRAMS (and similar):     Alcoholics Anonymous (local)  [x] [phone]  [x] www.[place].org for schedules for in-person and online meetings  [x] There are AA meetings throughout the day all over [facility]  [x] AA costs nothing to attend; they pass a basket for donations but this is not required    Narcotics Anonymous  [x] [phone]  [x] www.noana.org  [x] There are NA meetings throughout the day all over town  [x] NA costs nothing to attend; they pass a basket for donations but this is not required    Alcoholics Anonymous Online Intergroup (national)  [x] www.aa-intergroup.org  [x] Good resource for large, nation-wide meetings  [x] Can also attend smaller, local meetings in other cities  [x] Countless meetings all day and all night  [x] AA costs nothing to attend; they pass a basket for donations  but this is not required    LOOKING FOR AN ALTERNATIVE TO 12 STEP PROGRAMS - check out:  SMART Recovery: https://www.smartrecovery.org/about-us  [facility]u Recovery: https://recoverydharma.org      DETOX UNITS (USUALLY 5-7 DAYS):     [facility]: [address], [facility]  [phone], call first to ensure bed availability    [facility]: [address] [facility]  [phone], Option 1, call first to ensure bed availability    [facility]: [address] , [facility]  [phone] (intake by appointment only)    [facility]: [address], [facility]  [phone]      INTENSIVE OUTPATIENT PROGRAMS:     [facility] (formerly known as the ABU)  [x] [phone], Option 2  [x] [address] [facility] [address] 4th Floor, [facility] [address]  [x] https://www.[email or web address].org/services/[facility]-recovery-program  [x] The [facility] delivers comprehensive and collaborative treatment for alcohol and substance use disorders.  Excellent program for working professionals or anyone else seeking recovery.  [x] Requires insurance approval prior to starting program, call number above for more information.  [x] Intensive Outpatient Rehabilitation Program - M-F 9am-3pm - daily groups with psychologists and social workers, sessions with MDs 3x per week   [x] Ambulatory detox and dual diagnosis available    [facility] Int[facility]patient Program  [x] [phone]  [x] [address] [facility] (the clinic not on [facility])  [x] Call number above for more info and to check insurance requirements    [facility]  [address] [place], [place] [address] [phone]    [facility]:  [address] [facility], [address]?, [place], [place] [address]?, [phone]  [address] [facility] [address] [facility]?, [place], [place] [address]?, [phone]    RESIDENTIAL REHABS (USUALLY 28 DAYS):     [facility]: [address] [facility] [address] [phone]    [facility]:  [address] [place] [address]  [phone] (intake by appointment only)    [facility] (men only) [address] [place] [address], [phone]    [facility] (Female only) [address] [place], [phone]    [facility]: [address], [place], men's program [phone], women's program [phone]    [facility]: [address] [place] [address], [phone]    [facility]: [address] [place], [place], [phone]    [facility]: Men only, [phone], [address] [place]    [facility]: [address], [place], [place], [phone]    [facility]: [address] [place] [address],  [phone]  New Location: [address] [place] [address] [address], [place] [address], [phone]    [facility]:   ?[address]?[place], [place] [address]?[phone]    [place]: [address] [place] [address], [phone]    [place]: [place], [place], [phone]     [facility]: [place], [place], [phone]    [facility]: [place], [place], [phone]    [facility]: [place], [place], [phone]    [place]: [place], [place], [phone]    [facility]: [address] [facility], [place] [address], [phone]    COMMUNITY ADDICTION CLINICS:     [facility]: [address] [facility], [address] [place], [phone] -or- [address] [place] [address]    [facility] [place]: [address], [place] James  [address]     [facility]: Clinics [phone]; Crisis [phone]    [facility]: [address] [place] [address]    [facility]: [address] [facility] [address]    [facility]: [address] [place] [address],    [facility]: 2nd Floor [address] [facility] [place] [address]    [facility]: [address], [place] [address]    [place]  [facility], [address], [place] [address]    [facility]: [address] [facility], [place] [phone]  (serves youth 16-23 years old)    [facility]: [facility] Low/[place] [phone]    [facility]: [address] [facility], [place] [phone]    [facility]: [address], [place] [phone]    [facility]: [address] [facility] [place], [address], [phone]     [facility]: [address] [facility] [place], [phone], [phone]    RESOURCES IN OTHER [facility]:     [facility]: [address] [place], [phone], [phone]    [facility] [place] [facility]: [address] [facility], [address], [phone]    [facility], [address] [place] [address], [phone]    [facility]: [address] [facility], [phone]    [facility], [address] [facility], [phone] ([facility])    [facility], [address] [facility], [phone] ([facility])    [facility], [address] [facility], [address], [place], [phone] ([place], [place], and [facility])    [facility], [address] , [place], [phone] ([facility])    [facility] Hotline [phone], [phone]    [facility], [address] [facility], [facility], [address], [facility], [address] [facility], [facility] Center, 500 [facility] B., [facility] Center, [address], [place]    [facility], [address], [place] [phone]    [facility]  "[facility], [address] [facility] [phone]    [facility], [address] [facility], [phone]    [facility], [address] [facility], [phone]      Kyrgyz SPEAKING (en español):     Información de la reunión de Alcohólicos Anónimos  [facility], 10:00 am  Habla español  Esta reunión está abierta y cualquiera puede asistir.    Guinean speaking Alcoholics anonymous meetings:  El "Mejia [place] AA Skype" es un mejia on line de Alcohólicos Anónimos en [facility]. El mejia es estuardo, gratuito y virtual a través de Skype Audio. El mejia funciona mediante lance llamada grupal de voz, por lo que no se utiliza la videollamada, ni se pueden gregg las imágenes o rostros de los participantes. Hace carla años y medio abrimos el primer Mejia de AA por Skype en [place], richelle actualmente asisten personas desde [place], [place], [place], [place], [place], [place], [place], [place], [place], [place], [place] y [place], entre otros.    El mejia es muy útil para los alcohólicos que residen en lugares donde no se celebran reuniones de AA, o residen en lugares donde las reuniones de AA son un número limitado de días a la semana, o para aquellos compañeros que se hayan de viaje o que, por cualquier motivo, se hayan convalecientes y no pueden desplazarse. Todos los días nos reuniones a las 21:00 (hora española)    Podéis obtener más información sobre el mejia y selene sesiones en la página web [email or web address]      MENTAL HEALTH:     [facility] Out[facility]  [phone]    [facility] - General Psychiatry Intensive Outpatient Program  [facility] (formerly known as the [facility])  [phone], option 3    [facility] - Dual Diagnosis Intensive Outpatient Program  [facility] (formerly " known as the [facility])  [phone], option 2      COMMUNITY MENTAL HEALTH CENTERS:     [facility]  ([facility])  Serves [employer] and [facility] residents.  Serves uninsured patients & those with Medicaid.  Main location: [address] [place], [place] [address] [phone]  Walk-in's available during regular business hours.  24/7 Crisis Line: [phone]    [facility]  (aka [facility])  Serves [facility].  Serves uninsured patients, those with Medicaid and some private plans.  Walk-in's available during regular business hours.  Primary care services available as well.  [facility]: [address] [facility] [place] [address];  [phone]  [place]: [address] [place] [address];  [phone]  24/7 Crisis Line: [phone]    [facility]  Serves uninsured patients & those with Medicaid, call for more info.  Primary care, pediatrics, HIV treatment, and dentistry services available as well.  Three locations.  [phone]    [facility]porate Office  Serves patients with Medicaid, Medicare, and private insurance  [address]  [place],?[place] [address] [phone]    [facility]  Serves uninsured on a sliding scale, as well as Medicaid, Medicare, and private plans.  Eight locations around the [facility] area.  [phone]    [facility]  Serves uninsured patients & those with Medicaid, private insurances.  Primary care available as well.  [phone]  [facility] [place] [address]    [facility]  Serves veterans who were honorably discharged.  [address] [place] [address]  [phone]  24/7 Veterans Crisis Line: [phone] (Press 1)    If you have private insurance and need to find a  specialist, please contact your insurance network to request a list of providers covered by your benefits.      MENTAL HEALTH/ADDICTIVE DISORDERS:     AA ([phone]), NA ([phone])   National Suicide Prevention Lifeline- Call 1-625.692.8444 Available 24 hours everyday  [facility] 760-9143; Crisis Line 827-9756 - Call for options A-F:  Intensive Outpatient Treatment/ Day programs   [facility], please contact   [facility], please contact [phone] or [phone] to speak with an admissions counselor.  [facility] (Methadone Maintenance)   [address] [place] [address], [phone]  [address], [place] [address] [phone]  [facility], [address] [address], [phone]  [facility] Addiction Treatment [facility] [phone]  [facility] please contact [phone]  [facility], [address] [facility], 4th [place] [address] Phone: [phone]   [facility]cer  [place] Office: [address] [address], [phone]  [place] Office: [address] [place] [address] B, [place] [address], [phone]  [place] Office: [address] [place] [address] [phone]    Outpatient Substance Abuse Treatment   [facility] (Methadone Maintenance)   [address] [place] [address], [phone]  [address], [place] [address] [phone]  [facility], [address] [address], [phone]  [facility]cer  [place] Office: [address] [address], [phone]  [place] Office: [facility], [address], [place] [address], [phone]  [place] Office: [address] [place] [address] [phone]  [facility], [address] [place] [address] [phone]   [facility], [address] [facility], [address], [phone]  [place]  [facility], [address] [place], [place] [phone]    Residential Substance Abuse Treatment   [facility] [address] [facility], [phone] or [phone]  [facility], [address] [facility], [phone]  [facility] (men only) [address] [place] [address], [phone]  [facility] (women only) [address] [place] [address] [phone]  [facility], [address] [place] [address] [phone]  [facility] (women only), intakes at [address] [facility], [phone]  Responsibility [place] (7-day program, $100, [address].[place], [phone], [phone], [phone])  [facility] (Men only, [phone]), [address], [place] (Vets*/Non-Vets)  [facility] (Men only, $400/month program fee) [address] [facility], [phone]  [facility] (Women over age 39 only), [address] [facility], [phone]    Out of Area:    [place], [address] [place] [address], [place] [phone])  [facility] (men only), [address] [facility] [place] [address], [phone]  [facility], [address] [place], [place] ([phone])  [place], [facility] [place], [place] ([phone])  [facility], [address] [facility], [phone]  [facility] (Women only, has to have mental illness, can be homeless or substance abuser), [phone]        DOMESTIC VIOLENCE RESOURCES:     Advocacy  [facility] ([facility])  [address] [place] [address]    NOFJC ? [phone]  Services provided: emergency shelter, individual advocacy, information and referrals, group support, children's program, medical advocacy, forensic medical exams, primary care, legal assistance, counseling, safety planning, and caregiver support    [facility] HEALING AND EMPOWERMENT [place]  Confidential location  [facility], [place] ? [phone]  Services provided: short term emergency shelter, all services provided are free of charge    [facility] FOR COMMUNITY ADVOCACY  Multiple locations in [facility], [place], [facility], [facility], and [facility] ([place], [place], and [place])    [place] ? [phone]  Services provided: emergency shelter, individual advocacy, information and referrals, group support, children's program, medical advocacy, legal assistance in obtaining restraining orders, counseling, safety planning, and caregiver support    [facility]   Emergency Shelter   [phone]  Emergency Services ,Legal and Financial Assistance Services ,Housing Services ,Support Services     [place] [facility]   [phone]  Emergency Services ,Counseling Services , Housing Services ,Support Services ,Children's Services     [facility]OM[facility] A [facility]ISION  [address] [place] [address]  WWAV ? [phone]  Services provided: advocacy, health education and supportive services, specializing in free healing services for marginalized groups, including LGBTQ individuals and sex workers    SEXUAL TRAUMA AWARENESS AND RESPONSE (STAR)  [address] [place] [address]    STAR ? (626) 425-STAR  Services provided: individual advocacy, information and referrals, group support, medical advocacy, legal assistance, counseling, and safety planning for survivors of sexual assault    [facility] ([facility])  [address] [place] [address]  [facility] Forensic Program ? [phone]  Services provided: free forensic medical exams for sexual assault and domestic violence, which can be performed up to 5 days after an incident. It is not necessary to make a police report to receive a forensic medical exam    Legal  [facility]  [address] [address]  Project [facility] ? [phone]  Services provided: free emergency legal representation for survivors of doemstic  violence residing in [facility]. Legal services may include temporary restraining orders, temporary child support, custody, and use of property    [facility] LEGAL SERVICES (SLLS)  [address], [place] [address]  SLLS ? [phone]  Services provided: free legal representation for survivors of domestic violence residing in [facility]. Legal services may include temporary child support, custody, and divorce      HOTLINES:     [facility] DOMESTIC VIOLENCE HOTLINE  [phone]    Services provided: free and confidential hotline for victims and survivors of domestic violence. All calls will be routed to a domestic violence service provided in the victim or survivor's area    NATIONAL HUMAN TRAFFICKING HOTLINE  (189) 880-5892    Services provided: national anti-trafficking hotline serving victims and survivors of human trafficking. Provides information about local resources, and access to safe space to report tips, seek services, and ask for help    VIA LINK  211 or (794) 210-8252    Service provided: counselors can provide crisis counseling. Counselors can also provide information and referrals to programs which can help with needs such as food, shelter, medical care, financial assistance, mental health services, substance abuse treatment, senior services, childcare, and more      HOMELESS SHELTERS:      Homeless shelters  [facility]  Emergency shelter for individuals and families  [address]  [phone]  [facility]  Emergency shelter for men only  Meals daily 6am, 2pm, & 6pm  Clothing, case management M-F by appointment (ID/job/housing/legal assistance), mail  [address] [facility]  [phone]  [facility]  Emergency shelter for men  [address] [facility]  [phone]  Emergency shelter for women  [address] [facility]  [phone]  Breakfast & lunch daily, dinner M-F  Case management, job counseling services   [facility]  Emergency shelter for  teens and young adults up to 22yo  [address]  [phone]  [facility]  Emergency shelter for women over 17yo and their kids  [address] [place] [address]  [phone]  [facility]  Day program, meals M-F 1PM (arrive early)  Showers, laundry, hygiene kits, showers, phones, , notary services, case management, ID assistance  [address] [facility]   [phone] M-F 8am-2:30pm  [facility]  Day program  MTWF 7:30am-3:30pm,  8:30am-3:30pm  Crisis intervention, employment assistance, food/clothing, hygiene kits, bus tokens, mail  [address] [facility] [address]  [phone]  [facility]  Mobile outreach for homeless persons in [facility]  [phone]  [facility]  Primary healthcare, case management, dental services, TB placement  Call ahead  [address] [facility] 2nd Floor  [phone]  [facility] the [facility]  Connects homeless people with their loved ones in other cities by providing transportation costs   [phone]      [place] RESOURCES:     [place] Mobile Mental Health Crisis Response Team:    Region 12 ([place], [place], [place], and [facility]) ([facility])  [phone]      Outpatient Mental Health & Addiction Clinic Resources for both [facility]:    [facility] Mental Healthcare Resources  Website: [email or web address]  Main Number: 741-904-2527    [facility] ([facility])  [address] ([address] [facility]) [place] [address]  [phone]    [facility] ([facility])  [address] ([address]) [place] [address] [phone]    [facility]  [address] ([address]) [place] [address] [phone]    [facility]  [address] ([address] [facility]) [place] [address] [phone]      Addiction Treatment Resources for both [facility] and  [facility]:    [facility] (Detox, Residential, PHP, IOP, and Aftercare Programs)  [address] [place] [address]  [phone]    [facility] (Residential, IOP, Transitional Living, and Aftercare Programs)  [address] [place] [address]  [phone]    [facility] (Inpatient, Residential, Detox, IOP, Outpatient, and Aftercare Programs)  [address] [place] [address]  [phone] or toll free at [phone]      Outpatient Mental Health Psychotherapy Resources for both [facility] and [facility]:    Stella Monroe, [facility]  [address]  [place] [address]  [phone]  Specialties: Depression, Anxiety, and Life Transitions    Navya Cheatham, PhD  [address] [address]  [address]  [place] [address]  [phone]  Specialties: Testing and Evaluation, Education and Learning Disabilities, and ADHD    Mendy Hein, [facility] [address] [place] [address]  [phone]  Specialties: Obsessive-Compulsive (OCD), Depression, and Relationship Issues    Any Kiran [facility] [address] [place] [address]  [phone]  Specialties: Trauma & PTSD, Mood Disorders, and Anxiety    Any Aldana, PhD, [facility] [address], [place] [address]  [phone]  Specialties: Family Conflict, Child, and Relationship Issues    Deya Daley [facility] [place] [address] [phone]  Specialties: Anxiety, Depression, and Anger Management        IN CASE OF SUICIDAL THINKING, call the National Suicide Hotline Number: 988    988 Suicide & Crisis Lifeline: 985 , 4-909-899-TALK (0098)  Provides 24/7, free and confidential support for people in distress, prevention and crisis resources for you or your loved ones, and best practices for professionals.    Call,  text or chat.  https://[employer].org

## 2022-11-24 VITALS
RESPIRATION RATE: 18 BRPM | DIASTOLIC BLOOD PRESSURE: 67 MMHG | OXYGEN SATURATION: 94 % | WEIGHT: 315 LBS | HEART RATE: 95 BPM | BODY MASS INDEX: 45 KG/M2 | TEMPERATURE: 97 F | SYSTOLIC BLOOD PRESSURE: 129 MMHG

## 2022-11-24 PROBLEM — F41.1 GENERALIZED ANXIETY DISORDER: Status: ACTIVE | Noted: 2022-08-18

## 2022-11-24 LAB
ALBUMIN SERPL BCP-MCNC: 3.4 G/DL (ref 3.5–5.2)
ALP SERPL-CCNC: 142 U/L (ref 55–135)
ALT SERPL W/O P-5'-P-CCNC: 71 U/L (ref 10–44)
ANION GAP SERPL CALC-SCNC: 8 MMOL/L (ref 8–16)
AST SERPL-CCNC: 80 U/L (ref 10–40)
BASOPHILS # BLD AUTO: 0.08 K/UL (ref 0–0.2)
BASOPHILS NFR BLD: 1.2 % (ref 0–1.9)
BILIRUB SERPL-MCNC: 0.6 MG/DL (ref 0.1–1)
BUN SERPL-MCNC: 12 MG/DL (ref 6–20)
CALCIUM SERPL-MCNC: 9.1 MG/DL (ref 8.7–10.5)
CHLORIDE SERPL-SCNC: 102 MMOL/L (ref 95–110)
CO2 SERPL-SCNC: 26 MMOL/L (ref 23–29)
CREAT SERPL-MCNC: 1.1 MG/DL (ref 0.5–1.4)
DIFFERENTIAL METHOD: ABNORMAL
EOSINOPHIL # BLD AUTO: 0.2 K/UL (ref 0–0.5)
EOSINOPHIL NFR BLD: 2.5 % (ref 0–8)
ERYTHROCYTE [DISTWIDTH] IN BLOOD BY AUTOMATED COUNT: 17.6 % (ref 11.5–14.5)
EST. GFR  (NO RACE VARIABLE): >60 ML/MIN/1.73 M^2
GLUCOSE SERPL-MCNC: 104 MG/DL (ref 70–110)
HCT VFR BLD AUTO: 38.5 % (ref 40–54)
HGB BLD-MCNC: 12.4 G/DL (ref 14–18)
IMM GRANULOCYTES # BLD AUTO: 0.07 K/UL (ref 0–0.04)
IMM GRANULOCYTES NFR BLD AUTO: 1 % (ref 0–0.5)
LYMPHOCYTES # BLD AUTO: 1.5 K/UL (ref 1–4.8)
LYMPHOCYTES NFR BLD: 22.6 % (ref 18–48)
MAGNESIUM SERPL-MCNC: 2.1 MG/DL (ref 1.6–2.6)
MCH RBC QN AUTO: 31.6 PG (ref 27–31)
MCHC RBC AUTO-ENTMCNC: 32.2 G/DL (ref 32–36)
MCV RBC AUTO: 98 FL (ref 82–98)
MONOCYTES # BLD AUTO: 0.6 K/UL (ref 0.3–1)
MONOCYTES NFR BLD: 8.8 % (ref 4–15)
NEUTROPHILS # BLD AUTO: 4.4 K/UL (ref 1.8–7.7)
NEUTROPHILS NFR BLD: 63.9 % (ref 38–73)
NRBC BLD-RTO: 0 /100 WBC
PHOSPHATE SERPL-MCNC: 4.2 MG/DL (ref 2.7–4.5)
PLATELET # BLD AUTO: 214 K/UL (ref 150–450)
PMV BLD AUTO: 10.3 FL (ref 9.2–12.9)
POTASSIUM SERPL-SCNC: 3.9 MMOL/L (ref 3.5–5.1)
PROT SERPL-MCNC: 7.5 G/DL (ref 6–8.4)
RBC # BLD AUTO: 3.92 M/UL (ref 4.6–6.2)
SODIUM SERPL-SCNC: 136 MMOL/L (ref 136–145)
WBC # BLD AUTO: 6.82 K/UL (ref 3.9–12.7)

## 2022-11-24 PROCEDURE — 84100 ASSAY OF PHOSPHORUS: CPT

## 2022-11-24 PROCEDURE — 63600175 PHARM REV CODE 636 W HCPCS

## 2022-11-24 PROCEDURE — 99238 HOSP IP/OBS DSCHRG MGMT 30/<: CPT | Mod: ,,, | Performed by: HOSPITALIST

## 2022-11-24 PROCEDURE — 80053 COMPREHEN METABOLIC PANEL: CPT

## 2022-11-24 PROCEDURE — 99238 PR HOSPITAL DISCHARGE DAY,<30 MIN: ICD-10-PCS | Mod: ,,, | Performed by: HOSPITALIST

## 2022-11-24 PROCEDURE — 36415 COLL VENOUS BLD VENIPUNCTURE: CPT

## 2022-11-24 PROCEDURE — 25000003 PHARM REV CODE 250

## 2022-11-24 PROCEDURE — 83735 ASSAY OF MAGNESIUM: CPT

## 2022-11-24 PROCEDURE — 85025 COMPLETE CBC W/AUTO DIFF WBC: CPT

## 2022-11-24 PROCEDURE — 25000003 PHARM REV CODE 250: Performed by: STUDENT IN AN ORGANIZED HEALTH CARE EDUCATION/TRAINING PROGRAM

## 2022-11-24 RX ORDER — PROMETHAZINE HYDROCHLORIDE 12.5 MG/1
12.5 TABLET ORAL EVERY 6 HOURS PRN
Status: DISCONTINUED | OUTPATIENT
Start: 2022-11-24 | End: 2022-11-24 | Stop reason: HOSPADM

## 2022-11-24 RX ORDER — MAG HYDROX/ALUMINUM HYD/SIMETH 200-200-20
30 SUSPENSION, ORAL (FINAL DOSE FORM) ORAL 4 TIMES DAILY PRN
Status: DISCONTINUED | OUTPATIENT
Start: 2022-11-24 | End: 2022-11-24 | Stop reason: HOSPADM

## 2022-11-24 RX ORDER — PANTOPRAZOLE SODIUM 40 MG/1
40 TABLET, DELAYED RELEASE ORAL DAILY
Qty: 30 TABLET | Refills: 2 | Status: SHIPPED | OUTPATIENT
Start: 2022-11-24 | End: 2023-02-09 | Stop reason: CLARIF

## 2022-11-24 RX ADMIN — CHLORDIAZEPOXIDE HYDROCHLORIDE 50 MG: 25 CAPSULE ORAL at 10:11

## 2022-11-24 RX ADMIN — LOSARTAN POTASSIUM 100 MG: 50 TABLET, FILM COATED ORAL at 10:11

## 2022-11-24 RX ADMIN — ACETAMINOPHEN 1000 MG: 500 TABLET ORAL at 10:11

## 2022-11-24 RX ADMIN — FOLIC ACID 1 MG: 1 TABLET ORAL at 10:11

## 2022-11-24 RX ADMIN — ENOXAPARIN SODIUM 40 MG: 100 INJECTION SUBCUTANEOUS at 10:11

## 2022-11-24 RX ADMIN — PROMETHAZINE HYDROCHLORIDE 12.5 MG: 12.5 TABLET ORAL at 10:11

## 2022-11-24 RX ADMIN — THERA TABS 1 TABLET: TAB at 10:11

## 2022-11-24 RX ADMIN — Medication 100 MG: at 10:11

## 2022-11-24 RX ADMIN — ATORVASTATIN CALCIUM 80 MG: 40 TABLET, FILM COATED ORAL at 10:11

## 2022-11-24 RX ADMIN — PANTOPRAZOLE SODIUM 40 MG: 40 TABLET, DELAYED RELEASE ORAL at 10:11

## 2022-11-24 NOTE — DISCHARGE SUMMARY
[facility] Medicine  Discharge Summary      Patient Name: Lola Wetzel  MRN: [id number]  JUAN: [id number]  Patient Class: IP- Inpatient  Admission Date: 11/21/2022  Hospital Length of Stay: 2 days  Discharge Date and Time:  11/24/2022 3:41 PM  Attending Physician: No att. providers found   Discharging Provider: Jovan Mak MD  Primary Care Provider: Janet Alaniz [facility] Team: [facility] HOSP MED 1 Jovan Mak MD  Primary Care Team: [facility] HOSP MED 1    HPI:   Damari is a 38 yo M with Insomnia, HTN, Bipolar I disorder, sleep apnea non-compliant with CPAP, alcohol dependence, hx of acute pancreatitis in 7/2022 requiring ICU stay 2/2 sever agitation presenting with withdrawal symptoms. Patient has been self medicating his insomnia for 5-6 months with alcohol and xanax. Following his ICU stay for acute pancreatitis, he has been tapering his alcohol usage and has ceased taking xanax. He used to drink 1 gallon of vodka with some wine a day, but has reduced intake to 1 5th of vodka a day. He states he tried going to bed Sunday night without alcohol, and woke up feeling nauseated, tremulous, and auditory hallucinating (sounds of stadium/crowd, no discernable voices). Around 7pm on day of presentation, patient felt abdominal pain similar to his pancreatitis episode, which prompted him to call p11, he subsequently drank 1 5th of vodka before EMS picked him up. Patient had 1 episode of NBNB vomit on arrival to ED. Upon questioning patient has also been having SOB with lying flat, requiring 2 pillows propped behind back for relief. Patient also has pitting edema on exam and family history of CHF. Other presenting symptoms include HA, Photophobia, chills, diaphoresis. Patient denies fever, CP, SOB, HI, SI, hematochezia, dysuria, seizure.    In ED, Tachycardic, /95, CMP wnl, CBC with mild normocytic anemia, BNP <10, ETOH 62, UA with trace proteinuria. Given 1 L LR, tylenol, 10  mg valium.    CT with No acute intra-abdominopelvic abnormality.  2. Interval resolution of previously described retroperitoneal soft tissue stranding edema near the pancreas.  No CT findings consistent with acute or chronic pancreatitis.  3. Hepatomegaly with steatosis.  4. Appendix is normal in size with small appendicolith at the tip, similar to prior. No periappendiceal inflammatory changes.    CXR: No acute findings in the chest.    EKG sinus tach, no ST/T wave abnormalities      * No surgery found *      Hospital Course:   Admitted for alcohol withdrawal.  Started on librium taper. Withdrawal symptoms improved. Echo with EF50%, difficult windows and IVC not assessed.  Patient refused CPAP for RAKESH. Patient had post-prandial abdominal pain, improved with GI cocktail and maalox. Did not want to pursue rehab at this time. Discussed attending AA meetings which patient will attempt. To f/u with psychiatry and bariatric surgery outpatient.        Goals of Care Treatment Preferences:  Code Status: Full Code      Consults:   Consults (From admission, onward)        Status Ordering Provider     Inpatient consult to Psychiatry  Once        Provider:  (Not yet assigned)    KIMBERLYN Duncan          No new Assessment & Plan notes have been filed under this hospital service since the last note was generated.  Service: Hospital Medicine    Final Active Diagnoses:    Diagnosis Date Noted POA    PRINCIPAL PROBLEM:  Alcohol dependence with withdrawal [F10.239] 06/28/2022 Yes    Morbid obesity [E66.01] 11/23/2022 Yes    Abdominal pain [R10.9] 11/22/2022 Yes    Sleep apnea [G47.30] 11/22/2022 Yes    Lower extremity edema [R60.0] 11/22/2022 Yes    Hypertension [I10] 11/22/2022 Yes    Insomnia [G47.00] 07/04/2022 Yes    Bipolar affective disorder in remission [F31.70] 06/15/2017 Yes      Problems Resolved During this Admission:       Discharged Condition: stable    Disposition: Home or Self Care    Follow  Up:    Patient Instructions:      Ambulatory referral/consult to Bariatric Surgery   Standing Status: Future   Referral Priority: Routine Referral Type: Consultation   Referral Reason: Specialty Services Required   Requested Specialty: Bariatrics   Number of Visits Requested: 1     Ambulatory referral/consult to Psychiatry   Standing Status: Future   Referral Priority: Routine Referral Type: Psychiatric   Referral Reason: Specialty Services Required   Requested Specialty: Psychiatry   Number of Visits Requested: 1       Significant Diagnostic Studies: Labs:   BMP:   Recent Labs   Lab 11/23/22  0328 11/24/22  0528    104   * 136   K 4.0 3.9    102   CO2 23 26   BUN 12 12   CREATININE 1.2 1.1   CALCIUM 8.9 9.1   MG 1.9 2.1    and CBC   Recent Labs   Lab 11/23/22  0328 11/24/22  0528   WBC 6.60 6.82   HGB 11.9* 12.4*   HCT 36.6* 38.5*    214     Vitals:    11/24/22 1156   BP: 129/67   Pulse: 95   Resp: 18   Temp: 97.3 °F (36.3 °C)     Physical Exam  Constitutional:       General: He is not in acute distress.     Appearance: Normal appearance. He is obese.   HENT:      Head: Normocephalic and atraumatic.      Right Ear: External ear normal.      Left Ear: External ear normal.   Eyes:      General: No scleral icterus.        Right eye: No discharge.         Left eye: No discharge.      Extraocular Movements: Extraocular movements intact.      Pupils: Pupils are equal, round, and reactive to light.   Cardiovascular:      Rate and Rhythm: Tachycardia present.      Heart sounds: No murmur heard.  Pulmonary:      Effort: Pulmonary effort is normal. No respiratory distress.      Breath sounds: Normal breath sounds.   Abdominal:      General: There is distension.      Tenderness: There is abdominal tenderness.   Musculoskeletal:      Cervical back: Normal range of motion. No rigidity.      Right lower leg: Edema present.      Left lower leg: Edema present.   Skin:     Coloration: Skin is not  jaundiced.      Findings: No bruising.   Neurological:      General: No focal deficit present.      Mental Status: He is alert and oriented to person, place, and time.   Psychiatric:         Mood and Affect: Mood normal.         Behavior: Behavior normal.     Pending Diagnostic Studies:     None         Medications:  Reconciled Home Medications:      Medication List      CONTINUE taking these medications    atorvastatin 80 MG tablet  Commonly known as: LIPITOR  Take 1 tablet (80 mg total) by mouth once daily.     losartan 50 MG tablet  Commonly known as: COZAAR  Take 50 mg by mouth once daily.     pantoprazole 40 MG tablet  Commonly known as: PROTONIX  Take 1 tablet (40 mg total) by mouth once daily.     QUEtiapine 300 MG Tb24  Commonly known as: SEROQUEL XR  Take 600 mg by mouth every evening.            Indwelling Lines/Drains at time of discharge:   Lines/Drains/Airways     None                 Time spent on the discharge of patient: 30 minutes         Jovan Mak MD  Department of Hospital Medicine  [facility]

## 2022-11-24 NOTE — NURSING
Pt is AAOx4, VSS, on RA. Has c/o nausea, medicated per MAR. Resting comfortably, no significant changes.    [Up to Date] : Up to Date [FreeTextEntry1] : flu vaccine 9/2019

## 2022-11-24 NOTE — PLAN OF CARE
Sw provided list of AA meetings locally around the [facility] area. Pt did verbalize appreciation. Sw setup Lyft ride to transport Pt home.

## 2022-11-24 NOTE — NURSING
Pt is AAOx4, VSS, on RA     IV was removed, discharge paperwork was given, and list of local AA meetings was provided.

## 2022-12-02 ENCOUNTER — HOSPITAL ENCOUNTER (EMERGENCY)
Facility: OTHER | Age: 37
Discharge: HOME | End: 2022-12-02
Payer: MEDICAID

## 2022-12-02 VITALS
TEMPERATURE: 98 F | HEART RATE: 100 BPM | RESPIRATION RATE: 16 BRPM | SYSTOLIC BLOOD PRESSURE: 150 MMHG | HEIGHT: 75 IN | OXYGEN SATURATION: 99 % | BODY MASS INDEX: 39.17 KG/M2 | WEIGHT: 315 LBS | DIASTOLIC BLOOD PRESSURE: 95 MMHG

## 2022-12-02 DIAGNOSIS — G89.29 CHRONIC UPPER ABDOMINAL PAIN: Primary | ICD-10-CM

## 2022-12-02 DIAGNOSIS — R10.10 CHRONIC UPPER ABDOMINAL PAIN: Primary | ICD-10-CM

## 2022-12-02 DIAGNOSIS — K29.20 CHRONIC ALCOHOLIC GASTRITIS, PRESENCE OF BLEEDING UNSPECIFIED: ICD-10-CM

## 2022-12-02 DIAGNOSIS — F10.220 ACUTE ALCOHOLIC INTOXICATION IN ALCOHOLISM WITHOUT COMPLICATION: ICD-10-CM

## 2022-12-02 LAB
ALBUMIN SERPL BCP-MCNC: 3.7 G/DL (ref 3.5–5.2)
ALP SERPL-CCNC: 148 U/L (ref 55–135)
ALT SERPL W/O P-5'-P-CCNC: 81 U/L (ref 10–44)
ANION GAP SERPL CALC-SCNC: 10 MMOL/L (ref 8–16)
AST SERPL-CCNC: 140 U/L (ref 10–40)
BASOPHILS # BLD AUTO: 0.11 K/UL (ref 0–0.2)
BASOPHILS NFR BLD: 3 % (ref 0–1.9)
BILIRUB SERPL-MCNC: 0.4 MG/DL (ref 0.1–1)
BUN SERPL-MCNC: 11 MG/DL (ref 6–20)
CALCIUM SERPL-MCNC: 8.4 MG/DL (ref 8.7–10.5)
CHLORIDE SERPL-SCNC: 105 MMOL/L (ref 95–110)
CO2 SERPL-SCNC: 26 MMOL/L (ref 23–29)
CREAT SERPL-MCNC: 1.2 MG/DL (ref 0.5–1.4)
DIFFERENTIAL METHOD: ABNORMAL
EOSINOPHIL # BLD AUTO: 0.1 K/UL (ref 0–0.5)
EOSINOPHIL NFR BLD: 1.4 % (ref 0–8)
ERYTHROCYTE [DISTWIDTH] IN BLOOD BY AUTOMATED COUNT: 16.7 % (ref 11.5–14.5)
EST. GFR  (NO RACE VARIABLE): >60 ML/MIN/1.73 M^2
ETHANOL SERPL-MCNC: 262 MG/DL
GLUCOSE SERPL-MCNC: 108 MG/DL (ref 70–110)
HCT VFR BLD AUTO: 37.4 % (ref 40–54)
HGB BLD-MCNC: 12.7 G/DL (ref 14–18)
IMM GRANULOCYTES # BLD AUTO: 0 K/UL (ref 0–0.04)
IMM GRANULOCYTES NFR BLD AUTO: 0 % (ref 0–0.5)
LIPASE SERPL-CCNC: 37 U/L (ref 4–60)
LYMPHOCYTES # BLD AUTO: 1.6 K/UL (ref 1–4.8)
LYMPHOCYTES NFR BLD: 43.8 % (ref 18–48)
MAGNESIUM SERPL-MCNC: 2.5 MG/DL (ref 1.6–2.6)
MCH RBC QN AUTO: 32.2 PG (ref 27–31)
MCHC RBC AUTO-ENTMCNC: 34 G/DL (ref 32–36)
MCV RBC AUTO: 95 FL (ref 82–98)
MONOCYTES # BLD AUTO: 0.4 K/UL (ref 0.3–1)
MONOCYTES NFR BLD: 10.3 % (ref 4–15)
NEUTROPHILS # BLD AUTO: 1.5 K/UL (ref 1.8–7.7)
NEUTROPHILS NFR BLD: 41.5 % (ref 38–73)
NRBC BLD-RTO: 1 /100 WBC
PLATELET # BLD AUTO: 346 K/UL (ref 150–450)
PMV BLD AUTO: 9.8 FL (ref 9.2–12.9)
POTASSIUM SERPL-SCNC: 4 MMOL/L (ref 3.5–5.1)
PROT SERPL-MCNC: 7.7 G/DL (ref 6–8.4)
RBC # BLD AUTO: 3.95 M/UL (ref 4.6–6.2)
SODIUM SERPL-SCNC: 141 MMOL/L (ref 136–145)
WBC # BLD AUTO: 3.7 K/UL (ref 3.9–12.7)

## 2022-12-02 PROCEDURE — 80053 COMPREHEN METABOLIC PANEL: CPT

## 2022-12-02 PROCEDURE — 99284 EMERGENCY DEPT VISIT MOD MDM: CPT | Mod: 25

## 2022-12-02 PROCEDURE — 96361 HYDRATE IV INFUSION ADD-ON: CPT

## 2022-12-02 PROCEDURE — 83690 ASSAY OF LIPASE: CPT

## 2022-12-02 PROCEDURE — 63600175 PHARM REV CODE 636 W HCPCS

## 2022-12-02 PROCEDURE — 82077 ASSAY SPEC XCP UR&BREATH IA: CPT

## 2022-12-02 PROCEDURE — 83735 ASSAY OF MAGNESIUM: CPT

## 2022-12-02 PROCEDURE — 85025 COMPLETE CBC W/AUTO DIFF WBC: CPT

## 2022-12-02 PROCEDURE — 96374 THER/PROPH/DIAG INJ IV PUSH: CPT

## 2022-12-02 PROCEDURE — 25000003 PHARM REV CODE 250

## 2022-12-02 RX ORDER — FAMOTIDINE 20 MG/1
20 TABLET, FILM COATED ORAL
Status: COMPLETED | OUTPATIENT
Start: 2022-12-02 | End: 2022-12-02

## 2022-12-02 RX ORDER — MAG HYDROX/ALUMINUM HYD/SIMETH 200-200-20
30 SUSPENSION, ORAL (FINAL DOSE FORM) ORAL ONCE
Status: COMPLETED | OUTPATIENT
Start: 2022-12-02 | End: 2022-12-02

## 2022-12-02 RX ORDER — LIDOCAINE HYDROCHLORIDE 20 MG/ML
15 SOLUTION OROPHARYNGEAL ONCE
Status: COMPLETED | OUTPATIENT
Start: 2022-12-02 | End: 2022-12-02

## 2022-12-02 RX ORDER — CHLORDIAZEPOXIDE HYDROCHLORIDE 25 MG/1
CAPSULE, GELATIN COATED ORAL
Qty: 15 CAPSULE | Refills: 0 | Status: SHIPPED | OUTPATIENT
Start: 2022-12-02 | End: 2022-12-06

## 2022-12-02 RX ORDER — CHLORDIAZEPOXIDE HYDROCHLORIDE 25 MG/1
CAPSULE, GELATIN COATED ORAL
Qty: 15 CAPSULE | Refills: 0 | Status: SHIPPED | OUTPATIENT
Start: 2022-12-02 | End: 2022-12-02 | Stop reason: SDUPTHER

## 2022-12-02 RX ORDER — ONDANSETRON 2 MG/ML
4 INJECTION INTRAMUSCULAR; INTRAVENOUS
Status: COMPLETED | OUTPATIENT
Start: 2022-12-02 | End: 2022-12-02

## 2022-12-02 RX ADMIN — ALUMINUM HYDROXIDE, MAGNESIUM HYDROXIDE, AND SIMETHICONE 30 ML: 200; 200; 20 SUSPENSION ORAL at 06:12

## 2022-12-02 RX ADMIN — SODIUM CHLORIDE 1000 ML: 0.9 INJECTION, SOLUTION INTRAVENOUS at 05:12

## 2022-12-02 RX ADMIN — ONDANSETRON 4 MG: 2 INJECTION INTRAMUSCULAR; INTRAVENOUS at 05:12

## 2022-12-02 RX ADMIN — FAMOTIDINE 20 MG: 20 TABLET ORAL at 08:12

## 2022-12-02 RX ADMIN — LIDOCAINE HYDROCHLORIDE 15 ML: 20 SOLUTION ORAL; TOPICAL at 06:12

## 2022-12-02 NOTE — ED PROVIDER NOTES
"Encounter Date: 12/2/2022       History     Chief Complaint   Patient presents with    Abdominal Pain     Pt reports chronic abdominal pain hx of alcoholism; pt also reporting insomnia x 13 years. Last drink today, currently intoxicated     HPI  Patient with history of alcohol abuse and chronic abdominal pain presents to ED via EMS for evaluation of abdominal pain.  Pain located across upper abdomen, described as aching, worse with drinking ETOH but patient reports continued alcohol use, last drink this afternoon.  No specific alleviating factors, associated with nausea and occasional vomiting.  These symptoms are very similar to numerous prior episodes.  Patient also reports issues with insomnia but says this has been ongoing for many years.  No other specific complaints at this time.      Review of patient's allergies indicates:   Allergen Reactions    Ativan [lorazepam] Other (See Comments)     Muscle spasms in neck & back when taking medication.     Past Medical History:   Diagnosis Date    Alcohol dependence with withdrawal 06/28/2022    Alcoholic pancreatitis 07/2022    Bipolar affective disorder in remission 06/15/2017    ?Dx    Hypertension     Hypertriglyceridemia 06/28/2022    Insomnia     Sleep apnea 11/22/2022     No past surgical history on file.  No family history on file.  Social History     Tobacco Use    Smoking status: Some Days     Types: Vaping with nicotine    Smokeless tobacco: Current     Types: Chew   Substance Use Topics    Alcohol use: Yes     Comment: reports "a lot" every night to go to sleep. will not report a number of drinks.    Drug use: No     Review of Systems   Constitutional:  Negative for chills and fever.   HENT:  Negative for congestion and rhinorrhea.    Eyes:  Negative for pain.   Respiratory:  Negative for cough and shortness of breath.    Cardiovascular:  Negative for chest pain.   Gastrointestinal:  Positive for abdominal pain, nausea and vomiting.   Genitourinary:  " Negative for dysuria.   Musculoskeletal:  Negative for back pain.   Allergic/Immunologic: Negative for immunocompromised state.   Neurological:  Negative for headaches.   Psychiatric/Behavioral:  Positive for sleep disturbance (chronic insomnia).      Physical Exam     Initial Vitals   BP Pulse Resp Temp SpO2   12/02/22 1700 12/02/22 1700 12/02/22 1634 12/02/22 1700 12/02/22 1700   134/87 93 16 98.4 °F (36.9 °C) 100 %      MAP       --                Physical Exam    Constitutional: He appears well-developed. He is Obese . No distress.   HENT:   Head: Normocephalic.   Mucous membranes tacky   Eyes: EOM are normal. Pupils are equal, round, and reactive to light.   Neck:   Normal range of motion.  Cardiovascular:  Normal rate, regular rhythm and normal heart sounds.           Pulmonary/Chest: Breath sounds normal. No respiratory distress.   Abdominal: Abdomen is soft and protuberant. Bowel sounds are normal. There is abdominal tenderness in the epigastric area, periumbilical area and left upper quadrant. There is no rebound and no guarding.   Musculoskeletal:         General: Normal range of motion.      Cervical back: Normal range of motion.     Neurological: He is alert and oriented to person, place, and time.   Skin: Skin is warm and dry.       ED Course   Procedures  Labs Reviewed   CBC W/ AUTO DIFFERENTIAL - Abnormal; Notable for the following components:       Result Value    WBC 3.70 (*)     RBC 3.95 (*)     Hemoglobin 12.7 (*)     Hematocrit 37.4 (*)     MCH 32.2 (*)     RDW 16.7 (*)     Gran # (ANC) 1.5 (*)     nRBC 1 (*)     Basophil % 3.0 (*)     All other components within normal limits   COMPREHENSIVE METABOLIC PANEL - Abnormal; Notable for the following components:    Calcium 8.4 (*)     Alkaline Phosphatase 148 (*)      (*)     ALT 81 (*)     All other components within normal limits   ALCOHOL,MEDICAL (ETHANOL) - Abnormal; Notable for the following components:    Alcohol, Serum 262 (*)     All  other components within normal limits   LIPASE   MAGNESIUM          Imaging Results    None          Medications   sodium chloride 0.9% bolus 1,000 mL (0 mLs Intravenous Stopped 12/2/22 1850)   ondansetron injection 4 mg (4 mg Intravenous Given 12/2/22 1738)   aluminum-magnesium hydroxide-simethicone 200-200-20 mg/5 mL suspension 30 mL (30 mLs Oral Given 12/2/22 1827)     And   LIDOcaine HCl 2% oral solution 15 mL (15 mLs Oral Given 12/2/22 1827)   famotidine tablet 20 mg (20 mg Oral Given 12/2/22 2023)       MDM:  Patient presented to ED due to exacerbation of chronic abdominal pain and acute alcohol intoxication.  Labs show elevated LFT's stable compared to prior, lipase normal.  ETOH 262.  Patient given IV fluids, zofran, and GI cocktail, has been sleeping comfortably after receiving these medications and appears in no distress when awake.  Plan to continue to observe patient in ED since he is acutely intoxicated, will re-evaluate once appropriately sober.        On subsequent re-evaluation, patient appears in no distress, speaking clearly and appropriately, ambulating steadily and independently, has been eating/drinking with no N/V.  Patient clinically sober and stable for discharge home.  Substance abuse resources provided.  Signs and symptoms that would warrant immediate return to ED were reviewed prior to discharge.      Clinical Impression:   Final diagnoses:  [R10.10, G89.29] Chronic upper abdominal pain (Primary)  [F10.220] Acute alcoholic intoxication in alcoholism without complication  [K29.20] Chronic alcoholic gastritis, presence of bleeding unspecified        ED Disposition Condition    Discharge Stable          ED Prescriptions       Medication Sig Dispense Start Date End Date Auth. Provider    chlordiazepoxide (LIBRIUM) 25 MG Cap Take 2 capsules (50 mg total) by mouth 4 (four) times daily for 1 day, THEN 1 capsule (25 mg total) 4 (four) times daily for 1 day, THEN 1 capsule (25 mg total) 2 (two)  times a day for 1 day, THEN 1 capsule (25 mg total) once daily for 1 day. 15 capsule 12/2/2022 12/6/2022 David Dallas MD          Follow-up Information       Follow up With Specialties Details Why Contact Info    Janet Alaniz,  Family Medicine Schedule an appointment as soon as possible for a visit  Follow up with your primary care physician for outpatient recheck. [address] [facility] [address]  [facility] [address]  [phone]      [facility] Emergency Dept Emergency Medicine  Return to the ED sooner for any new or worsening symptoms or for any other concerns. [address] [facility] [address]  [phone]             David Dallas MD  12/02/22 1716

## 2022-12-03 NOTE — ED NOTES
Per provider it is okay for the patient to be d/c to home. Provider has discussed the findings with the patient. Discharge paperwork and education has been given to the patient at this time. Patient in NAD. Pt is able to ambulate with a strong and steady gait. Respirations are even and unlabored. Nothing further at this time.

## 2022-12-03 NOTE — ED NOTES
Patient ate 2 sandwiches and juice with no issues. Independently ambulated down hallway to bathroom, MD made aware.

## 2022-12-23 ENCOUNTER — HOSPITAL ENCOUNTER (EMERGENCY)
Facility: OTHER | Age: 37
Discharge: HOME | End: 2022-12-24
Attending: EMERGENCY MEDICINE
Payer: MEDICAID

## 2022-12-23 DIAGNOSIS — I10 HYPERTENSION, UNSPECIFIED TYPE: ICD-10-CM

## 2022-12-23 DIAGNOSIS — D64.9 ANEMIA, UNSPECIFIED TYPE: ICD-10-CM

## 2022-12-23 DIAGNOSIS — E83.39 HYPOPHOSPHATEMIA: ICD-10-CM

## 2022-12-23 DIAGNOSIS — R40.20 LOSS OF CONSCIOUSNESS: ICD-10-CM

## 2022-12-23 DIAGNOSIS — R60.0 BILATERAL LOWER EXTREMITY EDEMA: ICD-10-CM

## 2022-12-23 DIAGNOSIS — F10.10 ETOH ABUSE: Primary | ICD-10-CM

## 2022-12-23 LAB
ALBUMIN SERPL BCP-MCNC: 3.9 G/DL (ref 3.5–5.2)
ALP SERPL-CCNC: 135 U/L (ref 55–135)
ALT SERPL W/O P-5'-P-CCNC: 145 U/L (ref 10–44)
ANION GAP SERPL CALC-SCNC: 16 MMOL/L (ref 8–16)
AST SERPL-CCNC: 363 U/L (ref 10–40)
BACTERIA #/AREA URNS HPF: NORMAL /HPF
BASOPHILS # BLD AUTO: 0.14 K/UL (ref 0–0.2)
BASOPHILS NFR BLD: 3.1 % (ref 0–1.9)
BILIRUB SERPL-MCNC: 0.5 MG/DL (ref 0.1–1)
BILIRUB UR QL STRIP: NEGATIVE
BNP SERPL-MCNC: <10 PG/ML (ref 0–99)
BUN SERPL-MCNC: 15 MG/DL (ref 6–20)
CALCIUM SERPL-MCNC: 8.4 MG/DL (ref 8.7–10.5)
CHLORIDE SERPL-SCNC: 100 MMOL/L (ref 95–110)
CLARITY UR: CLEAR
CO2 SERPL-SCNC: 21 MMOL/L (ref 23–29)
COLOR UR: YELLOW
CREAT SERPL-MCNC: 1 MG/DL (ref 0.5–1.4)
CTP QC/QA: YES
DIFFERENTIAL METHOD: ABNORMAL
EOSINOPHIL # BLD AUTO: 0.1 K/UL (ref 0–0.5)
EOSINOPHIL NFR BLD: 1.8 % (ref 0–8)
ERYTHROCYTE [DISTWIDTH] IN BLOOD BY AUTOMATED COUNT: 14.3 % (ref 11.5–14.5)
EST. GFR  (NO RACE VARIABLE): >60 ML/MIN/1.73 M^2
ETHANOL SERPL-MCNC: 74 MG/DL
GLUCOSE SERPL-MCNC: 83 MG/DL (ref 70–110)
GLUCOSE UR QL STRIP: NEGATIVE
HCT VFR BLD AUTO: 39.8 % (ref 40–54)
HGB BLD-MCNC: 13.9 G/DL (ref 14–18)
HGB UR QL STRIP: ABNORMAL
HYALINE CASTS #/AREA URNS LPF: 0 /LPF
IMM GRANULOCYTES # BLD AUTO: 0.01 K/UL (ref 0–0.04)
IMM GRANULOCYTES NFR BLD AUTO: 0.2 % (ref 0–0.5)
KETONES UR QL STRIP: NEGATIVE
LEUKOCYTE ESTERASE UR QL STRIP: NEGATIVE
LYMPHOCYTES # BLD AUTO: 1 K/UL (ref 1–4.8)
LYMPHOCYTES NFR BLD: 21.2 % (ref 18–48)
MAGNESIUM SERPL-MCNC: 2.2 MG/DL (ref 1.6–2.6)
MCH RBC QN AUTO: 32.3 PG (ref 27–31)
MCHC RBC AUTO-ENTMCNC: 34.9 G/DL (ref 32–36)
MCV RBC AUTO: 93 FL (ref 82–98)
MICROSCOPIC COMMENT: NORMAL
MONOCYTES # BLD AUTO: 0.2 K/UL (ref 0.3–1)
MONOCYTES NFR BLD: 4.7 % (ref 4–15)
NEUTROPHILS # BLD AUTO: 3.1 K/UL (ref 1.8–7.7)
NEUTROPHILS NFR BLD: 69 % (ref 38–73)
NITRITE UR QL STRIP: NEGATIVE
NRBC BLD-RTO: 0 /100 WBC
PH UR STRIP: 6 [PH] (ref 5–8)
PHOSPHATE SERPL-MCNC: 2 MG/DL (ref 2.7–4.5)
PLATELET # BLD AUTO: 197 K/UL (ref 150–450)
PMV BLD AUTO: 10.4 FL (ref 9.2–12.9)
POCT GLUCOSE: 97 MG/DL (ref 70–110)
POTASSIUM SERPL-SCNC: 4.4 MMOL/L (ref 3.5–5.1)
PROT SERPL-MCNC: 8.4 G/DL (ref 6–8.4)
PROT UR QL STRIP: ABNORMAL
RBC # BLD AUTO: 4.3 M/UL (ref 4.6–6.2)
RBC #/AREA URNS HPF: 1 /HPF (ref 0–4)
SARS-COV-2 RDRP RESP QL NAA+PROBE: NEGATIVE
SODIUM SERPL-SCNC: 137 MMOL/L (ref 136–145)
SP GR UR STRIP: >=1.03 (ref 1–1.03)
URN SPEC COLLECT METH UR: ABNORMAL
UROBILINOGEN UR STRIP-ACNC: NEGATIVE EU/DL
WBC # BLD AUTO: 4.49 K/UL (ref 3.9–12.7)
WBC #/AREA URNS HPF: 2 /HPF (ref 0–5)

## 2022-12-23 PROCEDURE — 80053 COMPREHEN METABOLIC PANEL: CPT | Performed by: EMERGENCY MEDICINE

## 2022-12-23 PROCEDURE — 25000003 PHARM REV CODE 250: Performed by: EMERGENCY MEDICINE

## 2022-12-23 PROCEDURE — 82962 GLUCOSE BLOOD TEST: CPT

## 2022-12-23 PROCEDURE — 99285 EMERGENCY DEPT VISIT HI MDM: CPT | Mod: 25

## 2022-12-23 PROCEDURE — 84100 ASSAY OF PHOSPHORUS: CPT | Performed by: EMERGENCY MEDICINE

## 2022-12-23 PROCEDURE — 82077 ASSAY SPEC XCP UR&BREATH IA: CPT | Performed by: EMERGENCY MEDICINE

## 2022-12-23 PROCEDURE — 81000 URINALYSIS NONAUTO W/SCOPE: CPT | Performed by: EMERGENCY MEDICINE

## 2022-12-23 PROCEDURE — 63600175 PHARM REV CODE 636 W HCPCS: Performed by: EMERGENCY MEDICINE

## 2022-12-23 PROCEDURE — 87635 SARS-COV-2 COVID-19 AMP PRB: CPT | Performed by: EMERGENCY MEDICINE

## 2022-12-23 PROCEDURE — 93010 ELECTROCARDIOGRAM REPORT: CPT | Mod: ,,, | Performed by: INTERNAL MEDICINE

## 2022-12-23 PROCEDURE — 93005 ELECTROCARDIOGRAM TRACING: CPT

## 2022-12-23 PROCEDURE — 93010 EKG 12-LEAD: ICD-10-PCS | Mod: ,,, | Performed by: INTERNAL MEDICINE

## 2022-12-23 PROCEDURE — 96365 THER/PROPH/DIAG IV INF INIT: CPT

## 2022-12-23 PROCEDURE — 85025 COMPLETE CBC W/AUTO DIFF WBC: CPT | Performed by: EMERGENCY MEDICINE

## 2022-12-23 PROCEDURE — 83735 ASSAY OF MAGNESIUM: CPT | Performed by: EMERGENCY MEDICINE

## 2022-12-23 PROCEDURE — 96361 HYDRATE IV INFUSION ADD-ON: CPT

## 2022-12-23 PROCEDURE — 96374 THER/PROPH/DIAG INJ IV PUSH: CPT | Mod: 59

## 2022-12-23 PROCEDURE — 83880 ASSAY OF NATRIURETIC PEPTIDE: CPT | Performed by: EMERGENCY MEDICINE

## 2022-12-23 RX ORDER — CHLORDIAZEPOXIDE HYDROCHLORIDE 25 MG/1
25 CAPSULE, GELATIN COATED ORAL 3 TIMES DAILY PRN
Qty: 21 CAPSULE | Refills: 0 | Status: SHIPPED | OUTPATIENT
Start: 2022-12-23 | End: 2023-02-09

## 2022-12-23 RX ORDER — ONDANSETRON 2 MG/ML
4 INJECTION INTRAMUSCULAR; INTRAVENOUS
Status: COMPLETED | OUTPATIENT
Start: 2022-12-23 | End: 2022-12-23

## 2022-12-23 RX ORDER — LOSARTAN POTASSIUM 50 MG/1
50 TABLET ORAL
Status: COMPLETED | OUTPATIENT
Start: 2022-12-23 | End: 2022-12-23

## 2022-12-23 RX ORDER — CLONIDINE HYDROCHLORIDE 0.1 MG/1
0.2 TABLET ORAL
Status: COMPLETED | OUTPATIENT
Start: 2022-12-23 | End: 2022-12-23

## 2022-12-23 RX ORDER — DIAZEPAM 5 MG/1
5 TABLET ORAL
Status: COMPLETED | OUTPATIENT
Start: 2022-12-23 | End: 2022-12-23

## 2022-12-23 RX ADMIN — ONDANSETRON 4 MG: 2 INJECTION INTRAMUSCULAR; INTRAVENOUS at 09:12

## 2022-12-23 RX ADMIN — DIBASIC SODIUM PHOSPHATE, MONOBASIC POTASSIUM PHOSPHATE AND MONOBASIC SODIUM PHOSPHATE 1 TABLET: 852; 155; 130 TABLET ORAL at 10:12

## 2022-12-23 RX ADMIN — PROMETHAZINE HYDROCHLORIDE 12.5 MG: 25 INJECTION, SOLUTION INTRAMUSCULAR; INTRAVENOUS at 11:12

## 2022-12-23 RX ADMIN — CLONIDINE HYDROCHLORIDE 0.2 MG: 0.1 TABLET ORAL at 10:12

## 2022-12-23 RX ADMIN — DIAZEPAM 5 MG: 5 TABLET ORAL at 09:12

## 2022-12-23 RX ADMIN — LOSARTAN POTASSIUM 50 MG: 50 TABLET, FILM COATED ORAL at 09:12

## 2022-12-23 RX ADMIN — SODIUM CHLORIDE 1000 ML: 0.9 INJECTION, SOLUTION INTRAVENOUS at 08:12

## 2022-12-24 VITALS
HEART RATE: 100 BPM | BODY MASS INDEX: 39.17 KG/M2 | OXYGEN SATURATION: 100 % | TEMPERATURE: 99 F | HEIGHT: 75 IN | SYSTOLIC BLOOD PRESSURE: 175 MMHG | WEIGHT: 315 LBS | DIASTOLIC BLOOD PRESSURE: 80 MMHG | RESPIRATION RATE: 16 BRPM

## 2022-12-24 RX ORDER — DICYCLOMINE HYDROCHLORIDE 20 MG/1
20 TABLET ORAL 3 TIMES DAILY
Qty: 30 TABLET | Refills: 0 | Status: SHIPPED | OUTPATIENT
Start: 2022-12-24 | End: 2023-01-23

## 2022-12-24 NOTE — ED NOTES
Pt completed PO challenge able to tolerate liquids without vomiting. Still c/o nausea intermittently. Had one episode of diarrhea

## 2022-12-24 NOTE — ED PROVIDER NOTES
"Encounter Date: 12/23/2022    SCRIBE #1 NOTE: I, Julio Cabrera, am scribing for, and in the presence of,  Jax Bernabe MD. I have scribed the following portions of the note - Other sections scribed: HPI, ROS, PE.     History     Chief Complaint   Patient presents with    Loss of Consciousness     per EMS "possible unwitnessed seizure", pt was sitting on couch and woke up on the floor; pt also reporting n/v with body aches; Hx of ETOH abuse, last drink yesterday     Time seen by provider: 6:36 PM    This is a 37 y.o. male with PMHx of alcohol dependence and insomnia who presents with loss of consciousness that occurred this morning. Patient states that he is currently trying to cut back on his alcohol consumption. He reports that he previously drank one gallon of alcohol per day but has cut down to 1/5 gallon of alcohol per day since two days ago. Patient states that he ceased drinking alcohol 36 hours prior to this morning. Patient reports that he had difficulty sleeping last night due to not taking his 600 mg Seroquel medication, which he is currently out. Patient states that he consumed 1/5 gallon of alcohol 7 hours ago. He reports that shortly after, he woke up on the floor after falling off the couch and was unable to remember how he got there. Patient notes that it could have been a possible unwitnessed seizure. He denies biting his tongue during his syncope. Patient reports associated withdrawal symptoms of diarrhea, chills, and body shakes. He states that he has had similar episodes of these withdrawal symptoms before. Patient notes sore throat, cough, nausea, and vomiting, as well. Patient also states that he has bilateral ankle swelling. He reports that he is prescribed medication for HTN but does not take it. Patient has a known allergy to Ativan. Patient notes that he lives alone.   This is the extent of the patient's complaints at this time.      The history is provided by the patient.   Review " "of patient's allergies indicates:   Allergen Reactions    Ativan [lorazepam] Other (See Comments)     Muscle spasms in neck & back when taking medication.     Past Medical History:   Diagnosis Date    Alcohol dependence with withdrawal 06/28/2022    Alcoholic pancreatitis 07/2022    Bipolar affective disorder in remission 06/15/2017    ?Dx    Hypertension     Hypertriglyceridemia 06/28/2022    Insomnia     Sleep apnea 11/22/2022     No past surgical history on file.  No family history on file.  Social History     Tobacco Use    Smoking status: Some Days     Types: Vaping with nicotine    Smokeless tobacco: Current     Types: Chew   Substance Use Topics    Alcohol use: Yes     Comment: reports "a lot" every night to go to sleep. will not report a number of drinks.    Drug use: No     Review of Systems   Constitutional:  Positive for activity change and chills. Negative for fever.   HENT:  Positive for sore throat. Negative for congestion.    Eyes:  Negative for photophobia and redness.   Respiratory:  Positive for cough. Negative for shortness of breath.    Cardiovascular:  Negative for chest pain.   Gastrointestinal:  Positive for diarrhea, nausea and vomiting. Negative for abdominal pain.   Genitourinary:  Negative for dysuria.   Musculoskeletal:  Positive for joint swelling and myalgias. Negative for back pain.   Skin:  Negative for rash.   Neurological:  Positive for seizures and syncope. Negative for weakness, light-headedness and headaches.   Psychiatric/Behavioral:  Negative for confusion.      Physical Exam     Initial Vitals [12/23/22 1807]   BP Pulse Resp Temp SpO2   (!) 181/107 96 19 98.4 °F (36.9 °C) 98 %      MAP       --         Physical Exam    Nursing note and vitals reviewed.  Constitutional: He appears well-developed and well-nourished. He is not diaphoretic. No distress.   HENT:   Head: Normocephalic and atraumatic.   Sticky mucous membranes.   Eyes: EOM are normal. Pupils are equal, " round, and reactive to light.   Conjunctiva are pink, clear, and intact.   Neck: Neck supple.   No cervical lymphadenopathy.    Normal range of motion.  Cardiovascular:  Normal rate, regular rhythm and normal heart sounds.     Exam reveals no gallop and no friction rub.       No murmur heard.  Pulmonary/Chest: Breath sounds normal. No respiratory distress. He has no wheezes.   Abdominal: Abdomen is soft. Bowel sounds are normal. There is no abdominal tenderness.   Musculoskeletal:         General: No tenderness. Normal range of motion.      Cervical back: Normal range of motion and neck supple.      Comments: 1+ bilateral lower extremity edema.      Lymphadenopathy:     He has no cervical adenopathy.   Neurological: He is alert and oriented to person, place, and time.   Skin: Skin is warm and dry. Capillary refill takes less than 2 seconds. No rash noted. No pallor.   Psychiatric: He has a normal mood and affect. Thought content normal.       ED Course   Procedures  Labs Reviewed   CBC W/ AUTO DIFFERENTIAL - Abnormal; Notable for the following components:       Result Value    RBC 4.30 (*)     Hemoglobin 13.9 (*)     Hematocrit 39.8 (*)     MCH 32.3 (*)     Mono # 0.2 (*)     Basophil % 3.1 (*)     All other components within normal limits   COMPREHENSIVE METABOLIC PANEL - Abnormal; Notable for the following components:    CO2 21 (*)     Calcium 8.4 (*)      (*)      (*)     All other components within normal limits   URINALYSIS - Abnormal; Notable for the following components:    Specific Gravity, UA >=1.030 (*)     Protein, UA 2+ (*)     Occult Blood UA 1+ (*)     All other components within normal limits   ALCOHOL,MEDICAL (ETHANOL) - Abnormal; Notable for the following components:    Alcohol, Serum 74 (*)     All other components within normal limits   PHOSPHORUS - Abnormal; Notable for the following components:    Phosphorus 2.0 (*)     All other components within normal limits   MAGNESIUM   B-TYPE  NATRIURETIC PEPTIDE   URINALYSIS MICROSCOPIC   SARS-COV-2 RDRP GENE   POCT GLUCOSE     EKG Readings: (Independently Interpreted)   Normal sinus rhythm at rate of 87. No acute ST/T wave changes. No STEMI. Normal QRS duration.       ECG Results              EKG 12-lead (Final result)  Result time 12/26/22 09:27:07      Final result by Interface, Lab In [facility] (12/26/22 09:27:07)                   Narrative:    Test Reason : R40.20,    Vent. Rate : 087 BPM     Atrial Rate : 087 BPM     P-R Int : 156 ms          QRS Dur : 092 ms      QT Int : 374 ms       P-R-T Axes : 054 008 037 degrees     QTc Int : 450 ms    Normal sinus rhythm  Possible Anterior infarct ,age undetermined  Abnormal ECG    Confirmed by Keith Coughlin MD ([id number]) on 12/26/2022 9:26:58 AM    Referred By: THOR   SELF           Confirmed By:Keith Coughlin MD                                  Imaging Results    None          Medications   sodium chloride 0.9% bolus 1,000 mL 1,000 mL (0 mLs Intravenous Stopped 12/23/22 2107)   ondansetron injection 4 mg (4 mg Intravenous Given 12/23/22 2125)   diazePAM tablet 5 mg (5 mg Oral Given 12/23/22 2125)   losartan tablet 50 mg (50 mg Oral Given 12/23/22 2125)   cloNIDine tablet 0.2 mg (0.2 mg Oral Given 12/23/22 2229)   k phos di & mono-sod phos mono 250 mg tablet 1 tablet (1 tablet Oral Given 12/23/22 2229)   promethazine (PHENERGAN) 12.5 mg in dextrose 5 % 50 mL IVPB (0 mg Intravenous Stopped 12/23/22 2325)     Medical Decision Making:   History:   Old Medical Records: I decided to obtain old medical records.  Independently Interpreted Test(s):   I have ordered and independently interpreted X-rays - see prior notes.  Clinical Tests:   Lab Tests: Ordered and Reviewed  Radiological Study: Ordered and Reviewed  Medical Tests: Ordered and Reviewed        Scribe Attestation:   Scribe #1: I performed the above scribed service and the documentation accurately describes the services I performed. I attest to  the accuracy of the note.    Attending Attestation:             Attending ED Notes:   Emergent evaluation a 37-year-old male with past medical history of alcohol dependence who now presents to the emergency room with a complaint of rolling off his couch.  Patient is afebrile, nontoxic-appearing stable vital signs except for elevation of blood pressure.  No acute findings on urinary analysis except for blood and protein.  Patient has no elevation white blood cell count.  H&H 13.9 and 39.8.  No acute findings on CMP except for calcium of 8.4 and an elevation in liver enzymes.  Alcohol level is 74.  No acute findings on EKG.  No clinical signs of alcohol withdrawal.  The patient is extensively counseled on their diagnosis and treatment including all diagnostic, laboratory and physical exam findings.  The patient is discharged good condition and directed follow-up with their PCP in the next 24-48 hours.      ED Course as of 01/07/23 1831   Sat Dec 24, 2022   0107 I discussed patient with Dr. Lorenzo who had already discharged the patient. I reviewed pt's chart. Patient had already passed oral fluid challenge.  As patient was being readied for discharge by his nurse, patient requested to discuss his care plan with myself.   Patient stated he wanted to discuss outpatient treatment options for withdrawal, as well as symptomatic treatment for his diarrhea stating that he wanted to enjoy the holiday with his family.  After extensive discussion of risks and benefits of various treatment plans for outpatient medical management of his withdrawal and symptomatic treatment of his diarrhea, patient states he feels comfortable with discharge home.  He is already in an outpatient rehabilitation program.  Patient is considering going to a particular clinic ([facility]) for these concerns.   Patient was actually already prescribed a Librium taper by Dr. Lorenzo, which he states has been helpful in the past. I am adding bentyl.   Patient  was fully awake, alert, oriented with no signs of intoxication or withdrawal during this discussion.   --  I discussed with patient and/or guardian/caretaker that this evaluation in the ED does not suggest any emergent or life threatening medical condition requiring admission or further immediate intervention or diagnostics. Regardless, an unremarkable evaluation in the ED does not preclude the development or presence of a serious or life threatening condition. As such, patient was instructed to return for any worsening, new, changed, or concerning symptoms.     I had a detailed discussion with patient and/or guardian/caretaker regarding findings, plan, return precautions, importance of medication adherence, need to follow-up with a PCP and specialist. All questions answered.     [RC]      ED Course User Index  [RC] Emir Dickinson MD          Physician Attestation for Scribe: I, Jax Lorenzo, reviewed documentation as scribed in my presence, which is both accurate and complete.       Clinical Impression:   Final diagnoses:  [R60.0] Bilateral lower extremity edema  [F10.10] ETOH abuse (Primary)  [I10] Hypertension, unspecified type  [D64.9] Anemia, unspecified type  [E83.39] Hypophosphatemia  [R40.20] Loss of consciousness        ED Disposition Condition    Discharge Good          ED Prescriptions       Medication Sig Dispense Start Date End Date Auth. Provider    chlordiazepoxide (LIBRIUM) 25 MG Cap Take 1 capsule (25 mg total) by mouth 3 (three) times daily as needed (Alcohol withdrawl). 21 capsule 12/23/2022 1/22/2023 Jax Lorenzo MD    dicyclomine (BENTYL) 20 mg tablet Take 1 tablet (20 mg total) by mouth 3 (three) times daily. 30 tablet 12/24/2022 1/23/2023 Emir Dickinson MD          Follow-up Information       Follow up With Specialties Details Why Contact Info    Janet Alaniz,  Family Medicine In 2 days  [address] [address]  [phone]      [facility]  Emergency Dept Emergency Medicine  If symptoms worsen [address] [facility] [address]  [phone]             Jax Bernabe MD  01/07/23 [id number]

## 2023-01-27 ENCOUNTER — TELEPHONE (OUTPATIENT)
Dept: BARIATRICS | Facility: CLINIC | Age: 38
End: 2023-01-27
Payer: MEDICAID

## 2023-02-08 ENCOUNTER — HOSPITAL ENCOUNTER (INPATIENT)
Facility: HOSPITAL | Age: 38
LOS: 5 days | Discharge: LEFT BEFORE DISCHARGE | DRG: 439 | End: 2023-02-13
Attending: EMERGENCY MEDICINE | Admitting: STUDENT IN AN ORGANIZED HEALTH CARE EDUCATION/TRAINING PROGRAM
Payer: MEDICAID

## 2023-02-08 DIAGNOSIS — F10.20 ALCOHOL USE DISORDER, SEVERE, DEPENDENCE: ICD-10-CM

## 2023-02-08 DIAGNOSIS — R10.13 EPIGASTRIC PAIN: ICD-10-CM

## 2023-02-08 DIAGNOSIS — R07.9 CHEST PAIN: ICD-10-CM

## 2023-02-08 DIAGNOSIS — K85.20 ALCOHOL-INDUCED ACUTE PANCREATITIS WITHOUT INFECTION OR NECROSIS: Primary | ICD-10-CM

## 2023-02-08 DIAGNOSIS — K85.20 ALCOHOL-INDUCED ACUTE PANCREATITIS, UNSPECIFIED COMPLICATION STATUS: ICD-10-CM

## 2023-02-08 DIAGNOSIS — F10.930 ALCOHOL WITHDRAWAL SYNDROME WITHOUT COMPLICATION: ICD-10-CM

## 2023-02-08 LAB
ALBUMIN SERPL BCP-MCNC: 3.7 G/DL (ref 3.5–5.2)
ALP SERPL-CCNC: 129 U/L (ref 55–135)
ALT SERPL W/O P-5'-P-CCNC: 84 U/L (ref 10–44)
ANION GAP SERPL CALC-SCNC: 11 MMOL/L (ref 8–16)
AST SERPL-CCNC: 107 U/L (ref 10–40)
BASOPHILS # BLD AUTO: 0.09 K/UL (ref 0–0.2)
BASOPHILS NFR BLD: 1.8 % (ref 0–1.9)
BILIRUB SERPL-MCNC: 0.8 MG/DL (ref 0.1–1)
BILIRUB UR QL STRIP: NEGATIVE
BUN SERPL-MCNC: 9 MG/DL (ref 6–20)
CALCIUM SERPL-MCNC: 8.6 MG/DL (ref 8.7–10.5)
CHLORIDE SERPL-SCNC: 101 MMOL/L (ref 95–110)
CLARITY UR REFRACT.AUTO: CLEAR
CO2 SERPL-SCNC: 24 MMOL/L (ref 23–29)
COLOR UR AUTO: YELLOW
CREAT SERPL-MCNC: 0.9 MG/DL (ref 0.5–1.4)
DIFFERENTIAL METHOD: NORMAL
EOSINOPHIL # BLD AUTO: 0 K/UL (ref 0–0.5)
EOSINOPHIL NFR BLD: 0.6 % (ref 0–8)
ERYTHROCYTE [DISTWIDTH] IN BLOOD BY AUTOMATED COUNT: 14.4 % (ref 11.5–14.5)
EST. GFR  (NO RACE VARIABLE): >60 ML/MIN/1.73 M^2
GLUCOSE SERPL-MCNC: 156 MG/DL (ref 70–110)
GLUCOSE UR QL STRIP: NEGATIVE
HCT VFR BLD AUTO: 42 % (ref 40–54)
HGB BLD-MCNC: 14.6 G/DL (ref 14–18)
HGB UR QL STRIP: NEGATIVE
IMM GRANULOCYTES # BLD AUTO: 0.02 K/UL (ref 0–0.04)
IMM GRANULOCYTES NFR BLD AUTO: 0.4 % (ref 0–0.5)
KETONES UR QL STRIP: NEGATIVE
LEUKOCYTE ESTERASE UR QL STRIP: NEGATIVE
LIPASE SERPL-CCNC: 158 U/L (ref 4–60)
LYMPHOCYTES # BLD AUTO: 1.6 K/UL (ref 1–4.8)
LYMPHOCYTES NFR BLD: 31.9 % (ref 18–48)
MCH RBC QN AUTO: 30 PG (ref 27–31)
MCHC RBC AUTO-ENTMCNC: 34.8 G/DL (ref 32–36)
MCV RBC AUTO: 86 FL (ref 82–98)
MONOCYTES # BLD AUTO: 0.5 K/UL (ref 0.3–1)
MONOCYTES NFR BLD: 9.3 % (ref 4–15)
NEUTROPHILS # BLD AUTO: 2.8 K/UL (ref 1.8–7.7)
NEUTROPHILS NFR BLD: 56 % (ref 38–73)
NITRITE UR QL STRIP: NEGATIVE
NRBC BLD-RTO: 0 /100 WBC
PH UR STRIP: 7 [PH] (ref 5–8)
PLATELET # BLD AUTO: 238 K/UL (ref 150–450)
PMV BLD AUTO: 9.9 FL (ref 9.2–12.9)
POTASSIUM SERPL-SCNC: 3.7 MMOL/L (ref 3.5–5.1)
PROT SERPL-MCNC: 7.2 G/DL (ref 6–8.4)
PROT UR QL STRIP: NEGATIVE
RBC # BLD AUTO: 4.86 M/UL (ref 4.6–6.2)
SODIUM SERPL-SCNC: 136 MMOL/L (ref 136–145)
SP GR UR STRIP: 1.01 (ref 1–1.03)
URN SPEC COLLECT METH UR: NORMAL
WBC # BLD AUTO: 4.95 K/UL (ref 3.9–12.7)

## 2023-02-08 PROCEDURE — 25000003 PHARM REV CODE 250

## 2023-02-08 PROCEDURE — 63600175 PHARM REV CODE 636 W HCPCS: Performed by: EMERGENCY MEDICINE

## 2023-02-08 PROCEDURE — 63600175 PHARM REV CODE 636 W HCPCS

## 2023-02-08 PROCEDURE — 99285 PR EMERGENCY DEPT VISIT,LEVEL V: ICD-10-PCS | Mod: ,,, | Performed by: EMERGENCY MEDICINE

## 2023-02-08 PROCEDURE — 25500020 PHARM REV CODE 255: Performed by: EMERGENCY MEDICINE

## 2023-02-08 PROCEDURE — G0378 HOSPITAL OBSERVATION PER HR: HCPCS

## 2023-02-08 PROCEDURE — 12000002 HC ACUTE/MED SURGE SEMI-PRIVATE ROOM

## 2023-02-08 PROCEDURE — 81003 URINALYSIS AUTO W/O SCOPE: CPT | Performed by: PHYSICIAN ASSISTANT

## 2023-02-08 PROCEDURE — 25000003 PHARM REV CODE 250: Performed by: EMERGENCY MEDICINE

## 2023-02-08 PROCEDURE — 83690 ASSAY OF LIPASE: CPT | Performed by: PHYSICIAN ASSISTANT

## 2023-02-08 PROCEDURE — 99285 EMERGENCY DEPT VISIT HI MDM: CPT | Mod: 25

## 2023-02-08 PROCEDURE — 96374 THER/PROPH/DIAG INJ IV PUSH: CPT

## 2023-02-08 PROCEDURE — 96361 HYDRATE IV INFUSION ADD-ON: CPT

## 2023-02-08 PROCEDURE — 83036 HEMOGLOBIN GLYCOSYLATED A1C: CPT

## 2023-02-08 PROCEDURE — 93010 ELECTROCARDIOGRAM REPORT: CPT | Mod: ,,, | Performed by: INTERNAL MEDICINE

## 2023-02-08 PROCEDURE — 96376 TX/PRO/DX INJ SAME DRUG ADON: CPT

## 2023-02-08 PROCEDURE — 96375 TX/PRO/DX INJ NEW DRUG ADDON: CPT

## 2023-02-08 PROCEDURE — 99285 EMERGENCY DEPT VISIT HI MDM: CPT | Mod: ,,, | Performed by: EMERGENCY MEDICINE

## 2023-02-08 PROCEDURE — 93005 ELECTROCARDIOGRAM TRACING: CPT

## 2023-02-08 PROCEDURE — 80053 COMPREHEN METABOLIC PANEL: CPT | Performed by: PHYSICIAN ASSISTANT

## 2023-02-08 PROCEDURE — 85025 COMPLETE CBC W/AUTO DIFF WBC: CPT | Performed by: PHYSICIAN ASSISTANT

## 2023-02-08 PROCEDURE — 96372 THER/PROPH/DIAG INJ SC/IM: CPT

## 2023-02-08 PROCEDURE — 93010 EKG 12-LEAD: ICD-10-PCS | Mod: ,,, | Performed by: INTERNAL MEDICINE

## 2023-02-08 RX ORDER — ATORVASTATIN CALCIUM 20 MG/1
80 TABLET, FILM COATED ORAL DAILY
Status: DISCONTINUED | OUTPATIENT
Start: 2023-02-09 | End: 2023-02-13 | Stop reason: HOSPADM

## 2023-02-08 RX ORDER — LOSARTAN POTASSIUM 50 MG/1
50 TABLET ORAL DAILY
Status: DISCONTINUED | OUTPATIENT
Start: 2023-02-09 | End: 2023-02-13 | Stop reason: HOSPADM

## 2023-02-08 RX ORDER — HYDROMORPHONE HYDROCHLORIDE 1 MG/ML
1 INJECTION, SOLUTION INTRAMUSCULAR; INTRAVENOUS; SUBCUTANEOUS
Status: COMPLETED | OUTPATIENT
Start: 2023-02-08 | End: 2023-02-08

## 2023-02-08 RX ORDER — ONDANSETRON 2 MG/ML
4 INJECTION INTRAMUSCULAR; INTRAVENOUS
Status: COMPLETED | OUTPATIENT
Start: 2023-02-08 | End: 2023-02-08

## 2023-02-08 RX ORDER — FAMOTIDINE 10 MG/ML
20 INJECTION INTRAVENOUS
Status: COMPLETED | OUTPATIENT
Start: 2023-02-08 | End: 2023-02-08

## 2023-02-08 RX ORDER — PANTOPRAZOLE SODIUM 40 MG/1
40 TABLET, DELAYED RELEASE ORAL DAILY
Status: DISCONTINUED | OUTPATIENT
Start: 2023-02-09 | End: 2023-02-13 | Stop reason: HOSPADM

## 2023-02-08 RX ORDER — ONDANSETRON 8 MG/1
8 TABLET, ORALLY DISINTEGRATING ORAL EVERY 8 HOURS PRN
Status: DISCONTINUED | OUTPATIENT
Start: 2023-02-08 | End: 2023-02-09

## 2023-02-08 RX ORDER — NALOXONE HCL 0.4 MG/ML
0.02 VIAL (ML) INJECTION
Status: DISCONTINUED | OUTPATIENT
Start: 2023-02-08 | End: 2023-02-13 | Stop reason: HOSPADM

## 2023-02-08 RX ORDER — GLUCAGON 1 MG
1 KIT INJECTION
Status: DISCONTINUED | OUTPATIENT
Start: 2023-02-08 | End: 2023-02-13 | Stop reason: HOSPADM

## 2023-02-08 RX ORDER — OXYCODONE HYDROCHLORIDE 5 MG/1
5 TABLET ORAL EVERY 6 HOURS PRN
Status: DISCONTINUED | OUTPATIENT
Start: 2023-02-09 | End: 2023-02-09

## 2023-02-08 RX ORDER — IBUPROFEN 200 MG
24 TABLET ORAL
Status: DISCONTINUED | OUTPATIENT
Start: 2023-02-08 | End: 2023-02-13 | Stop reason: HOSPADM

## 2023-02-08 RX ORDER — PROCHLORPERAZINE EDISYLATE 5 MG/ML
5 INJECTION INTRAMUSCULAR; INTRAVENOUS EVERY 6 HOURS PRN
Status: DISCONTINUED | OUTPATIENT
Start: 2023-02-08 | End: 2023-02-13 | Stop reason: HOSPADM

## 2023-02-08 RX ORDER — DIAZEPAM 5 MG/1
10 TABLET ORAL EVERY 6 HOURS PRN
Status: DISCONTINUED | OUTPATIENT
Start: 2023-02-08 | End: 2023-02-09

## 2023-02-08 RX ORDER — THIAMINE HCL 100 MG
100 TABLET ORAL DAILY
Status: DISCONTINUED | OUTPATIENT
Start: 2023-02-09 | End: 2023-02-13 | Stop reason: HOSPADM

## 2023-02-08 RX ORDER — HYDROMORPHONE HYDROCHLORIDE 1 MG/ML
0.5 INJECTION, SOLUTION INTRAMUSCULAR; INTRAVENOUS; SUBCUTANEOUS ONCE AS NEEDED
Status: DISCONTINUED | OUTPATIENT
Start: 2023-02-09 | End: 2023-02-08

## 2023-02-08 RX ORDER — ENOXAPARIN SODIUM 100 MG/ML
40 INJECTION SUBCUTANEOUS EVERY 24 HOURS
Status: DISCONTINUED | OUTPATIENT
Start: 2023-02-08 | End: 2023-02-13 | Stop reason: HOSPADM

## 2023-02-08 RX ORDER — FOLIC ACID 1 MG/1
1 TABLET ORAL DAILY
Status: DISCONTINUED | OUTPATIENT
Start: 2023-02-09 | End: 2023-02-13 | Stop reason: HOSPADM

## 2023-02-08 RX ORDER — DIAZEPAM 5 MG/1
5 TABLET ORAL EVERY 12 HOURS
Status: DISCONTINUED | OUTPATIENT
Start: 2023-02-08 | End: 2023-02-10

## 2023-02-08 RX ORDER — IBUPROFEN 200 MG
16 TABLET ORAL
Status: DISCONTINUED | OUTPATIENT
Start: 2023-02-08 | End: 2023-02-13 | Stop reason: HOSPADM

## 2023-02-08 RX ORDER — HYDROMORPHONE HYDROCHLORIDE 1 MG/ML
1 INJECTION, SOLUTION INTRAMUSCULAR; INTRAVENOUS; SUBCUTANEOUS ONCE AS NEEDED
Status: COMPLETED | OUTPATIENT
Start: 2023-02-09 | End: 2023-02-08

## 2023-02-08 RX ORDER — HYDROMORPHONE HYDROCHLORIDE 1 MG/ML
0.5 INJECTION, SOLUTION INTRAMUSCULAR; INTRAVENOUS; SUBCUTANEOUS
Status: COMPLETED | OUTPATIENT
Start: 2023-02-08 | End: 2023-02-08

## 2023-02-08 RX ORDER — SODIUM CHLORIDE 0.9 % (FLUSH) 0.9 %
10 SYRINGE (ML) INJECTION EVERY 12 HOURS PRN
Status: DISCONTINUED | OUTPATIENT
Start: 2023-02-08 | End: 2023-02-13 | Stop reason: HOSPADM

## 2023-02-08 RX ADMIN — DIAZEPAM 5 MG: 5 TABLET ORAL at 11:02

## 2023-02-08 RX ADMIN — SODIUM CHLORIDE 1000 ML: 9 INJECTION, SOLUTION INTRAVENOUS at 06:02

## 2023-02-08 RX ADMIN — FAMOTIDINE 20 MG: 10 INJECTION INTRAVENOUS at 07:02

## 2023-02-08 RX ADMIN — SODIUM CHLORIDE 2000 ML: 9 INJECTION, SOLUTION INTRAVENOUS at 11:02

## 2023-02-08 RX ADMIN — HYDROMORPHONE HYDROCHLORIDE 0.5 MG: 1 INJECTION, SOLUTION INTRAMUSCULAR; INTRAVENOUS; SUBCUTANEOUS at 07:02

## 2023-02-08 RX ADMIN — HYDROMORPHONE HYDROCHLORIDE 1 MG: 1 INJECTION, SOLUTION INTRAMUSCULAR; INTRAVENOUS; SUBCUTANEOUS at 06:02

## 2023-02-08 RX ADMIN — IOHEXOL 100 ML: 350 INJECTION, SOLUTION INTRAVENOUS at 08:02

## 2023-02-08 RX ADMIN — ONDANSETRON 4 MG: 2 INJECTION INTRAMUSCULAR; INTRAVENOUS at 06:02

## 2023-02-08 RX ADMIN — ENOXAPARIN SODIUM 40 MG: 40 INJECTION SUBCUTANEOUS at 11:02

## 2023-02-08 RX ADMIN — HYDROMORPHONE HYDROCHLORIDE 1 MG: 1 INJECTION, SOLUTION INTRAMUSCULAR; INTRAVENOUS; SUBCUTANEOUS at 11:02

## 2023-02-08 NOTE — ED PROVIDER NOTES
Encounter Date: 2/8/2023    SCRIBE #1 NOTE: I, Marcia Moctezuma, am scribing for, and in the presence of,  Angeline Whatley MD. I have scribed the entire note.     History     Chief Complaint   Patient presents with    Abdominal Pain     Pt c/o generalized abdominal pain.  Vomiting x few days.  Pt states he has been drinking heavily the past few weeks.  Last drink was this am     Time patient was seen by the provider: 5:41 PM      The patient is a 37 y.o. male with past medical history of HTN, alcoholic pancreatitis who presents to the ED with a complaint of abdominal pain that worsened in the last 3 days. This feels similar to when he had pancreatitis about 6 months ago however this time the pain is more severe. Associated symptoms include vomiting and generalized weakness. He reports that he has been drinking more heavily in the past 2 weeks. His last drink was at about 10:00 this morning. He has been experiencing withdrawal symptoms today.     The history is provided by the patient and medical records. No  was used.   Review of patient's allergies indicates:  No Active Allergies    Past Medical History:   Diagnosis Date    Alcohol dependence with withdrawal 06/28/2022    Alcoholic pancreatitis 07/2022    Bipolar affective disorder in remission 06/15/2017    ?Dx    Hypertension     Hypertriglyceridemia 06/28/2022    Insomnia     Sleep apnea 11/22/2022     History reviewed. No pertinent surgical history.  History reviewed. No pertinent family history.  Social History     Tobacco Use    Smoking status: Some Days     Types: Vaping with nicotine    Smokeless tobacco: Current     Types: Chew   Substance Use Topics    Alcohol use: Yes     Comment: Daily beer, liquor, wine daily, last use `1000    Drug use: No     Review of Systems   Constitutional:  Negative for fever.   HENT:  Negative for sore throat.    Respiratory:  Negative for shortness of breath.    Cardiovascular:  Negative for chest  pain.   Gastrointestinal:  Positive for abdominal pain, nausea and vomiting.   Genitourinary:  Negative for dysuria.   Musculoskeletal:  Negative for back pain.   Skin:  Negative for rash.   Neurological:  Positive for weakness.   Hematological:  Does not bruise/bleed easily.     Physical Exam     Initial Vitals [02/08/23 1501]   BP Pulse Resp Temp SpO2   (!) 148/105 99 16 99.5 °F (37.5 °C) 97 %      MAP       --         Physical Exam    Nursing note and vitals reviewed.  Constitutional: Vital signs are normal. He appears well-developed and well-nourished.  Non-toxic appearance. He does not appear ill.   HENT:   Head: Normocephalic and atraumatic.   Mouth/Throat: Mucous membranes are normal. Mucous membranes are not dry.   Eyes: Conjunctivae and lids are normal.   Neck: Neck supple.   Normal range of motion.  Cardiovascular:  Normal rate.           Abdominal: There is abdominal tenderness in the epigastric area. There is no rebound and no guarding.   Musculoskeletal:      Cervical back: Normal range of motion and neck supple.     Neurological: He is alert and oriented to person, place, and time.   Skin: Skin is dry and intact. No pallor.   Psychiatric: He has a normal mood and affect. His speech is normal and behavior is normal.       ED Course   Procedures  Labs Reviewed   COMPREHENSIVE METABOLIC PANEL - Abnormal; Notable for the following components:       Result Value    Glucose 156 (*)     Calcium 8.6 (*)      (*)     ALT 84 (*)     All other components within normal limits   LIPASE - Abnormal; Notable for the following components:    Lipase 158 (*)     All other components within normal limits   HEMOGLOBIN A1C - Abnormal; Notable for the following components:    Hemoglobin A1C 5.9 (*)     All other components within normal limits   LIPID PANEL - Abnormal; Notable for the following components:    Cholesterol 88 (*)     Triglycerides 649 (*)     HDL 27 (*)     All other components within normal limits    COMPREHENSIVE METABOLIC PANEL - Abnormal; Notable for the following components:    CO2 22 (*)     Glucose 118 (*)     Calcium 8.1 (*)     AST 88 (*)     ALT 84 (*)     All other components within normal limits   PHOSPHORUS - Abnormal; Notable for the following components:    Phosphorus 4.9 (*)     All other components within normal limits   CBC W/ AUTO DIFFERENTIAL   URINALYSIS, REFLEX TO URINE CULTURE    Narrative:     Specimen Source->Urine   ALCOHOL,MEDICAL (ETHANOL)   MAGNESIUM   CBC W/ AUTO DIFFERENTIAL     EKG Readings: (Independently Interpreted)   Initial Reading: No STEMI. Previous EKG: Compared with most recent EKG Rhythm: Sinus Tachycardia. Heart Rate: 104. Axis: Normal.   17:48  Non specific T wave abnormality which is new compared to EKG from 12/23/22   ECG Results              EKG 12-lead (Final result)  Result time 02/09/23 08:02:05      Final result by Interface, Lab In [facility] [place] (02/09/23 08:02:05)                   Narrative:    Test Reason : R10.13,    Vent. Rate : 104 BPM     Atrial Rate : 104 BPM     P-R Int : 148 ms          QRS Dur : 088 ms      QT Int : 354 ms       P-R-T Axes : 059 014 040 degrees     QTc Int : 465 ms    Sinus tachycardia  Nonspecific T wave abnormality  Abnormal ECG  When compared with ECG of 23-DEC-2022 21:05,  Nonspecific T wave abnormality now evident in Lateral leads  Confirmed by DADA BAKER MD ([id number]) on 2/9/2023 8:01:53 AM    Referred By: [email or web address]   SELF           Confirmed By:DADA BAKER MD                                  Imaging Results              CT Abdomen Pelvis With Contrast (Final result)  Result time 02/08/23 21:27:22      Final result by Jax Marrero MD (02/08/23 21:27:22)                   Impression:      1.  Peripancreatic inflammatory change, most pronounced about the pancreatic head suggesting acute pancreatitis.    2.  Duodenal wall thickening and nell-duodenal inflammatory change which may relate to a nonspecific duodenitis versus  reactive inflammatory change.    3.  Multiple stable appearing pulmonary nodules at the right lung base.    4.  Hepatomegaly and hepatic steatosis.    5.  Additional findings as above.      Electronically signed by: Jax Marrero MD  Date:    02/08/2023  Time:    21:27               Narrative:    EXAMINATION:  CT ABDOMEN PELVIS WITH CONTRAST    CLINICAL HISTORY:  Epigastric pain;    TECHNIQUE:  Low dose axial images, sagittal and coronal reformations were obtained from the lung bases to the pubic symphysis following the IV administration of 100 mL of Omnipaque 350 .  Oral contrast was not given.    COMPARISON:  CT 11/22/2022    FINDINGS:  There are several pulmonary nodules again identified at the right lung base, similar to prior examination.  No new large confluent airspace consolidation or pleural fluid identified.  The visualized portions of the heart appear normal.    The liver is enlarged.  There is diffuse decreased attenuation of the hepatic parenchyma suggesting hepatic steatosis.  No evidence of cholelithiasis.  There is no intra-or extrahepatic biliary ductal dilatation.    There is peripancreatic inflammatory change, most pronounced about the pancreatic head.  Findings are suggestive of acute pancreatitis.  No discrete drainable peripancreatic fluid collection appreciated.  Stomach is not significantly distended.  There is wall thickening and inflammatory change about the duodenum which may represent a nonspecific duodenitis versus reactive inflammatory change.  The portal vein, SMV, and splenic vein are patent.    There is a stable indeterminate subcentimeter splenic hypodensity.  The adrenal glands appear within normal limits.    The kidneys are normal in size and location and enhance symmetrically.  There is no evidence of hydronephrosis. The urinary bladder is unremarkable. The prostate demonstrates no significant abnormality.    The abdominal aorta is normal in course and caliber without  significant atherosclerotic calcifications.    The visualized loops of small and large bowel show no evidence of obstruction or inflammation.  No CT evidence of acute appendicitis.  There is a small appendicolith within the appendiceal tip, similar to prior examination.  No evidence of free intraperitoneal air or portal venous gas.  There are few scattered small subcentimeter mesenteric lymph nodes, similar to prior examination.    Osseous structures demonstrate no acute abnormality.  There is a stable small nonspecific sclerotic focus within the right lateral 7th rib.  The extraperitoneal soft tissues are unremarkable.                                       Medications   sodium chloride 0.9% flush 10 mL (has no administration in time range)   naloxone 0.4 mg/mL injection 0.02 mg (has no administration in time range)   glucose chewable tablet 16 g (has no administration in time range)   glucose chewable tablet 24 g (has no administration in time range)   glucagon (human recombinant) injection 1 mg (has no administration in time range)   enoxaparin injection 40 mg (40 mg Subcutaneous Given 2/9/23 1704)   prochlorperazine injection Soln 5 mg (has no administration in time range)   multivitamin tablet (1 tablet Oral Given 2/9/23 0857)   folic acid tablet 1 mg (1 mg Oral Given 2/9/23 0857)   diazePAM tablet 5 mg (5 mg Oral Given 2/9/23 2136)   thiamine tablet 100 mg (100 mg Oral Given 2/9/23 0858)   atorvastatin tablet 80 mg (80 mg Oral Given 2/9/23 0856)   losartan tablet 50 mg (50 mg Oral Given 2/9/23 0857)   pantoprazole EC tablet 40 mg (40 mg Oral Given 2/9/23 0857)   dextrose 10% bolus 125 mL 125 mL (has no administration in time range)   dextrose 10% bolus 250 mL 250 mL (has no administration in time range)   0.9%  NaCl infusion (0 mL/hr Intravenous Stopped 2/9/23 1932)   hydrALAZINE tablet 25 mg (25 mg Oral Given 2/9/23 1219)   LORazepam tablet 2 mg (2 mg Oral Given 2/9/23 2136)   HYDROmorphone injection 0.5 mg  (0.5 mg Intravenous Given 2/9/23 2136)   ondansetron injection 4 mg (has no administration in time range)   0.9%  NaCl infusion ( Intravenous New Bag 2/9/23 2143)   oxyCODONE immediate release tablet Tab 10 mg (10 mg Oral Given 2/9/23 1948)   acetaminophen tablet 1,000 mg (1,000 mg Oral Given 2/9/23 2136)   ketorolac injection 15 mg (has no administration in time range)   QUEtiapine tablet 400 mg (has no administration in time range)   sodium chloride 0.9% bolus 1,000 mL 1,000 mL (0 mLs Intravenous Stopped 2/8/23 1918)   ondansetron injection 4 mg (4 mg Intravenous Given 2/8/23 1824)   HYDROmorphone injection 1 mg (1 mg Intravenous Given 2/8/23 1825)   HYDROmorphone injection 0.5 mg (0.5 mg Intravenous Given 2/8/23 1914)   famotidine (PF) injection 20 mg (20 mg Intravenous Given 2/8/23 1915)   iohexoL (OMNIPAQUE 350) injection 100 mL (100 mLs Intravenous Given 2/8/23 2057)   sodium chloride 0.9% bolus 2,000 mL 2,000 mL (0 mLs Intravenous Stopped 2/9/23 0128)   HYDROmorphone injection 1 mg (1 mg Intravenous Given 2/8/23 2346)   QUEtiapine tablet 300 mg (300 mg Oral Given 2/9/23 0202)   HYDROmorphone injection 1 mg (1 mg Intravenous Given 2/9/23 0430)   ketorolac injection 15 mg (15 mg Intravenous Given 2/9/23 1007)     Medical Decision Making:   History:   Old Medical Records: I decided to obtain old medical records.  Old Records Summarized: records from clinic visits and records from previous admission(s).  Initial Assessment:   Patient with signs and symptoms of pancreatitis. His lipase is elevated and he appears dehydrated.   Differential Diagnosis:   Pancreatitis, gastritis, peptic ulcer disease, perforated ulcer, SBO, colitis, cholecystitis, cholelithiasis   Independently Interpreted Test(s):   I have ordered and independently interpreted EKG Reading(s) - see prior notes  Clinical Tests:   Lab Tests: Ordered and Reviewed  Radiological Study: Ordered and Reviewed  Medical Tests: Ordered and Reviewed  ED  Management:  Plan to resuscitate with IV fluids, make patient NPO, treat with PPI for likely gastritis, and CT to evaluate for necrotizing pancreatitis and pancreatic pseudo cyst. Will treat pain with IV dilaudid and nausea with IV Zofran. Currently no signs of withdrawal but will need to closely monitor.   Pt will require admission as he is at risk for acute decompensation given his acute condition and chronic alcohol dependence        Scribe Attestation:   Scribe #1: I performed the above scribed service and the documentation accurately describes the services I performed. I attest to the accuracy of the note.                   Clinical Impression:   Final diagnoses:  [R10.13] Epigastric pain  [K85.20] Alcohol-induced acute pancreatitis without infection or necrosis (Primary)        ED Disposition Condition    Admit                 Angeline Whatley MD  02/09/23 [id number]

## 2023-02-08 NOTE — ED NOTES
Patient states mid upper abd pain, reports nausea, vomiting, states worse with taking or mvmt  Daily ETOH, wine, beer liquor, last uise 1000

## 2023-02-08 NOTE — FIRST PROVIDER EVALUATION
" Emergency Department TeleTriage Encounter Note      CHIEF COMPLAINT    Chief Complaint   Patient presents with    Abdominal Pain     Pt c/o generalized abdominal pain.  Vomiting x few days.  Pt states he has been drinking heavily the past few weeks.  Last drink was this am       VITAL SIGNS   Initial Vitals [02/08/23 1501]   BP Pulse Resp Temp SpO2   (!) 148/105 99 16 99.5 °F (37.5 °C) 97 %      MAP       --            ALLERGIES    Review of patient's allergies indicates:   Allergen Reactions    Ativan [lorazepam] Other (See Comments)     Muscle spasms in neck & back when taking medication.       PROVIDER TRIAGE NOTE  This is a teletriage evaluation of a 37 y.o. male presenting to the ED complaining of abdominal pain. Patient reports generalized abdominal pain since this morning. Patient has been drinking vodka, wine, and beer daily. He is unable to quantify just says "a lot." He has nausea and vomiting. Reports that the pain is similar to previous episodes of pancreatitis.    Patient is alert and oriented. He speaks in complete sentences. He is sitting upright in the chair in no distress. He appears ill, but non-toxic.    Initial orders will be placed and care will be transferred to an alternate provider when patient is roomed for a full evaluation. Any additional orders and the final disposition will be determined by that provider.         ORDERS  Labs Reviewed   CBC W/ AUTO DIFFERENTIAL   COMPREHENSIVE METABOLIC PANEL   LIPASE   URINALYSIS, REFLEX TO URINE CULTURE       ED Orders (720h ago, onward)      Start Ordered     Status Ordering Provider    02/08/23 1625 02/08/23 1624  Vital signs  Every 2 hours         Ordered ALEJANDRO, BRIDGET G.    02/08/23 1624 02/08/23 1624  Diet NPO  Diet effective now         Ordered ALEJANDRO, BRIDGET G.    02/08/23 1624 02/08/23 1624  Insert peripheral IV  Once         Ordered ALEJANDRO, BRIDGET G.    02/08/23 1624 02/08/23 1624  CBC W/ AUTO DIFFERENTIAL  STAT         Ordered ALEJANDRO, BRIDGET G.    " 02/08/23 1624 02/08/23 1624  Comp. Metabolic Panel  STAT         Ordered ALEJANDROBRIDGET G.    02/08/23 1624 02/08/23 1624  Lipase  STAT         Ordered ALEJANDROBRIDGET G.    02/08/23 1624 02/08/23 1624  Urinalysis, Reflex to Urine Culture Urine, Clean Catch  STAT         Ordered BRIDGET ALLEN G.              Virtual Visit Note: The provider triage portion of this emergency department evaluation and documentation was performed via [employer], a HIPAA-compliant telemedicine application, in concert with a tele-presenter in the room. A face to face patient evaluation with one of my colleagues will occur once the patient is placed in an emergency department room.      DISCLAIMER: This note was prepared with [employer] voice recognition transcription software. Garbled syntax, mangled pronouns, and other bizarre constructions may be attributed to that software system.

## 2023-02-08 NOTE — Clinical Note
Diagnosis: Alcohol-induced acute pancreatitis without infection or necrosis [[id number]]   Future Attending Provider: MADELYN EL [[id number]]   Admitting Provider:: MADELYN EL [[id number]]

## 2023-02-08 NOTE — ED NOTES
Patient identifiers verified and correct for  Mr Wetzel   C/C:  Abd pain, vomiting SEE NN  APPEARANCE: awake and alert in NAD. PAIN 10/10  SKIN: warm, dry and intact. No breakdown or bruising.  MUSCULOSKELETAL: Patient moving all extremities spontaneously, no obvious swelling or deformities noted. Ambulates independently.  RESPIRATORY: Denies shortness of breath.Respirations unlabored. Denies fevers  CARDIAC: Denies CP, 2+ distal pulses; no peripheral edema  ABDOMEN:  ABdomen round, pain to mid upper abdomen, reports nausea, vomitng   : voids spontaneously, denies difficulty  Neurologic: AAO x 4; follows commands equal strength in all extremities; denies numbness/tingling. Denies dizziness  denie new weakness

## 2023-02-09 PROBLEM — F10.930 ALCOHOL WITHDRAWAL SYNDROME WITHOUT COMPLICATION: Status: ACTIVE | Noted: 2023-02-09

## 2023-02-09 PROBLEM — F10.20 ALCOHOL USE DISORDER, SEVERE, DEPENDENCE: Status: ACTIVE | Noted: 2022-06-28

## 2023-02-09 LAB
ALBUMIN SERPL BCP-MCNC: 3.5 G/DL (ref 3.5–5.2)
ALP SERPL-CCNC: 125 U/L (ref 55–135)
ALT SERPL W/O P-5'-P-CCNC: 84 U/L (ref 10–44)
ANION GAP SERPL CALC-SCNC: 12 MMOL/L (ref 8–16)
AST SERPL-CCNC: 88 U/L (ref 10–40)
BASOPHILS # BLD AUTO: 0.08 K/UL (ref 0–0.2)
BASOPHILS NFR BLD: 1.2 % (ref 0–1.9)
BILIRUB SERPL-MCNC: 0.9 MG/DL (ref 0.1–1)
BUN SERPL-MCNC: 10 MG/DL (ref 6–20)
CALCIUM SERPL-MCNC: 8.1 MG/DL (ref 8.7–10.5)
CHLORIDE SERPL-SCNC: 105 MMOL/L (ref 95–110)
CHOLEST SERPL-MCNC: 88 MG/DL (ref 120–199)
CHOLEST/HDLC SERPL: 3.3 {RATIO} (ref 2–5)
CO2 SERPL-SCNC: 22 MMOL/L (ref 23–29)
CREAT SERPL-MCNC: 0.9 MG/DL (ref 0.5–1.4)
DIFFERENTIAL METHOD: NORMAL
EOSINOPHIL # BLD AUTO: 0 K/UL (ref 0–0.5)
EOSINOPHIL NFR BLD: 0.3 % (ref 0–8)
ERYTHROCYTE [DISTWIDTH] IN BLOOD BY AUTOMATED COUNT: 14.2 % (ref 11.5–14.5)
EST. GFR  (NO RACE VARIABLE): >60 ML/MIN/1.73 M^2
ESTIMATED AVG GLUCOSE: 123 MG/DL (ref 68–131)
ETHANOL SERPL-MCNC: <10 MG/DL
GLUCOSE SERPL-MCNC: 118 MG/DL (ref 70–110)
HBA1C MFR BLD: 5.9 % (ref 4–5.6)
HCT VFR BLD AUTO: 42 % (ref 40–54)
HDLC SERPL-MCNC: 27 MG/DL (ref 40–75)
HDLC SERPL: 30.7 % (ref 20–50)
HGB BLD-MCNC: 14 G/DL (ref 14–18)
IMM GRANULOCYTES # BLD AUTO: 0.03 K/UL (ref 0–0.04)
IMM GRANULOCYTES NFR BLD AUTO: 0.5 % (ref 0–0.5)
LDLC SERPL CALC-MCNC: ABNORMAL MG/DL (ref 63–159)
LYMPHOCYTES # BLD AUTO: 1.3 K/UL (ref 1–4.8)
LYMPHOCYTES NFR BLD: 20.1 % (ref 18–48)
MAGNESIUM SERPL-MCNC: 1.8 MG/DL (ref 1.6–2.6)
MCH RBC QN AUTO: 29.1 PG (ref 27–31)
MCHC RBC AUTO-ENTMCNC: 33.3 G/DL (ref 32–36)
MCV RBC AUTO: 87 FL (ref 82–98)
MONOCYTES # BLD AUTO: 0.6 K/UL (ref 0.3–1)
MONOCYTES NFR BLD: 9.3 % (ref 4–15)
NEUTROPHILS # BLD AUTO: 4.5 K/UL (ref 1.8–7.7)
NEUTROPHILS NFR BLD: 68.6 % (ref 38–73)
NONHDLC SERPL-MCNC: 61 MG/DL
NRBC BLD-RTO: 0 /100 WBC
PHOSPHATE SERPL-MCNC: 4.9 MG/DL (ref 2.7–4.5)
PLATELET # BLD AUTO: 247 K/UL (ref 150–450)
PMV BLD AUTO: 10.2 FL (ref 9.2–12.9)
POTASSIUM SERPL-SCNC: 4.3 MMOL/L (ref 3.5–5.1)
PROT SERPL-MCNC: 6.9 G/DL (ref 6–8.4)
RBC # BLD AUTO: 4.81 M/UL (ref 4.6–6.2)
SODIUM SERPL-SCNC: 139 MMOL/L (ref 136–145)
TRIGL SERPL-MCNC: 649 MG/DL (ref 30–150)
WBC # BLD AUTO: 6.57 K/UL (ref 3.9–12.7)

## 2023-02-09 PROCEDURE — 90833 PSYTX W PT W E/M 30 MIN: CPT | Mod: ,,, | Performed by: PSYCHIATRY & NEUROLOGY

## 2023-02-09 PROCEDURE — 99222 1ST HOSP IP/OBS MODERATE 55: CPT | Mod: ,,, | Performed by: PSYCHIATRY & NEUROLOGY

## 2023-02-09 PROCEDURE — 63600175 PHARM REV CODE 636 W HCPCS

## 2023-02-09 PROCEDURE — 25000003 PHARM REV CODE 250: Performed by: INTERNAL MEDICINE

## 2023-02-09 PROCEDURE — 25000003 PHARM REV CODE 250

## 2023-02-09 PROCEDURE — 82077 ASSAY SPEC XCP UR&BREATH IA: CPT | Performed by: STUDENT IN AN ORGANIZED HEALTH CARE EDUCATION/TRAINING PROGRAM

## 2023-02-09 PROCEDURE — 12000002 HC ACUTE/MED SURGE SEMI-PRIVATE ROOM

## 2023-02-09 PROCEDURE — 83735 ASSAY OF MAGNESIUM: CPT

## 2023-02-09 PROCEDURE — 99223 PR INITIAL HOSPITAL CARE,LEVL III: ICD-10-PCS | Mod: ,,, | Performed by: INTERNAL MEDICINE

## 2023-02-09 PROCEDURE — 99223 1ST HOSP IP/OBS HIGH 75: CPT | Mod: ,,, | Performed by: INTERNAL MEDICINE

## 2023-02-09 PROCEDURE — 90833 PR PSYCHOTHERAPY W/PATIENT W/E&M, 30 MIN (ADD ON): ICD-10-PCS | Mod: ,,, | Performed by: PSYCHIATRY & NEUROLOGY

## 2023-02-09 PROCEDURE — 80053 COMPREHEN METABOLIC PANEL: CPT

## 2023-02-09 PROCEDURE — 84100 ASSAY OF PHOSPHORUS: CPT

## 2023-02-09 PROCEDURE — 80061 LIPID PANEL: CPT | Performed by: STUDENT IN AN ORGANIZED HEALTH CARE EDUCATION/TRAINING PROGRAM

## 2023-02-09 PROCEDURE — 85025 COMPLETE CBC W/AUTO DIFF WBC: CPT

## 2023-02-09 PROCEDURE — 99222 PR INITIAL HOSPITAL CARE,LEVL II: ICD-10-PCS | Mod: ,,, | Performed by: PSYCHIATRY & NEUROLOGY

## 2023-02-09 RX ORDER — SODIUM CHLORIDE 9 MG/ML
INJECTION, SOLUTION INTRAVENOUS CONTINUOUS
Status: ACTIVE | OUTPATIENT
Start: 2023-02-09 | End: 2023-02-09

## 2023-02-09 RX ORDER — ACETAMINOPHEN 500 MG
1000 TABLET ORAL EVERY 6 HOURS PRN
Status: DISCONTINUED | OUTPATIENT
Start: 2023-02-09 | End: 2023-02-10

## 2023-02-09 RX ORDER — HYDRALAZINE HYDROCHLORIDE 25 MG/1
25 TABLET, FILM COATED ORAL EVERY 8 HOURS PRN
Status: DISCONTINUED | OUTPATIENT
Start: 2023-02-09 | End: 2023-02-13 | Stop reason: HOSPADM

## 2023-02-09 RX ORDER — LORAZEPAM 0.5 MG/1
2 TABLET ORAL EVERY 4 HOURS PRN
Status: DISCONTINUED | OUTPATIENT
Start: 2023-02-09 | End: 2023-02-13 | Stop reason: HOSPADM

## 2023-02-09 RX ORDER — HYDROMORPHONE HYDROCHLORIDE 1 MG/ML
0.2 INJECTION, SOLUTION INTRAMUSCULAR; INTRAVENOUS; SUBCUTANEOUS EVERY 6 HOURS PRN
Status: DISCONTINUED | OUTPATIENT
Start: 2023-02-09 | End: 2023-02-09

## 2023-02-09 RX ORDER — OXYCODONE HYDROCHLORIDE 5 MG/1
5 TABLET ORAL EVERY 6 HOURS PRN
Status: DISCONTINUED | OUTPATIENT
Start: 2023-02-09 | End: 2023-02-09

## 2023-02-09 RX ORDER — OXYCODONE HYDROCHLORIDE 10 MG/1
10 TABLET ORAL EVERY 6 HOURS PRN
Status: DISCONTINUED | OUTPATIENT
Start: 2023-02-09 | End: 2023-02-12

## 2023-02-09 RX ORDER — QUETIAPINE FUMARATE 300 MG/1
300 TABLET, FILM COATED ORAL NIGHTLY
Status: DISCONTINUED | OUTPATIENT
Start: 2023-02-09 | End: 2023-02-09

## 2023-02-09 RX ORDER — FOLIC ACID 1 MG/1
1000 TABLET ORAL DAILY
COMMUNITY
Start: 2023-01-09

## 2023-02-09 RX ORDER — QUETIAPINE FUMARATE 200 MG/1
400 TABLET, FILM COATED ORAL NIGHTLY
Status: DISCONTINUED | OUTPATIENT
Start: 2023-02-10 | End: 2023-02-09

## 2023-02-09 RX ORDER — KETOROLAC TROMETHAMINE 30 MG/ML
15 INJECTION, SOLUTION INTRAMUSCULAR; INTRAVENOUS EVERY 8 HOURS PRN
Status: DISCONTINUED | OUTPATIENT
Start: 2023-02-09 | End: 2023-02-09

## 2023-02-09 RX ORDER — HYDROMORPHONE HYDROCHLORIDE 1 MG/ML
1 INJECTION, SOLUTION INTRAMUSCULAR; INTRAVENOUS; SUBCUTANEOUS ONCE
Status: COMPLETED | OUTPATIENT
Start: 2023-02-09 | End: 2023-02-09

## 2023-02-09 RX ORDER — KETOROLAC TROMETHAMINE 15 MG/ML
15 INJECTION, SOLUTION INTRAMUSCULAR; INTRAVENOUS EVERY 6 HOURS PRN
Status: DISCONTINUED | OUTPATIENT
Start: 2023-02-09 | End: 2023-02-10

## 2023-02-09 RX ORDER — QUETIAPINE FUMARATE 300 MG/1
300 TABLET, FILM COATED ORAL ONCE
Status: COMPLETED | OUTPATIENT
Start: 2023-02-09 | End: 2023-02-09

## 2023-02-09 RX ORDER — KETOROLAC TROMETHAMINE 30 MG/ML
15 INJECTION, SOLUTION INTRAMUSCULAR; INTRAVENOUS ONCE
Status: COMPLETED | OUTPATIENT
Start: 2023-02-09 | End: 2023-02-09

## 2023-02-09 RX ORDER — QUETIAPINE FUMARATE 200 MG/1
400 TABLET, FILM COATED ORAL NIGHTLY
Status: DISCONTINUED | OUTPATIENT
Start: 2023-02-09 | End: 2023-02-12

## 2023-02-09 RX ORDER — SODIUM CHLORIDE 9 MG/ML
INJECTION, SOLUTION INTRAVENOUS CONTINUOUS
Status: ACTIVE | OUTPATIENT
Start: 2023-02-09 | End: 2023-02-10

## 2023-02-09 RX ORDER — HYDROMORPHONE HYDROCHLORIDE 1 MG/ML
0.5 INJECTION, SOLUTION INTRAMUSCULAR; INTRAVENOUS; SUBCUTANEOUS EVERY 6 HOURS PRN
Status: DISCONTINUED | OUTPATIENT
Start: 2023-02-09 | End: 2023-02-12

## 2023-02-09 RX ORDER — ONDANSETRON 2 MG/ML
4 INJECTION INTRAMUSCULAR; INTRAVENOUS EVERY 6 HOURS PRN
Status: DISCONTINUED | OUTPATIENT
Start: 2023-02-09 | End: 2023-02-10

## 2023-02-09 RX ORDER — DIAZEPAM 2 MG/1
2 TABLET ORAL EVERY 4 HOURS PRN
Status: DISCONTINUED | OUTPATIENT
Start: 2023-02-09 | End: 2023-02-09

## 2023-02-09 RX ADMIN — HYDROMORPHONE HYDROCHLORIDE 1 MG: 1 INJECTION, SOLUTION INTRAMUSCULAR; INTRAVENOUS; SUBCUTANEOUS at 04:02

## 2023-02-09 RX ADMIN — KETOROLAC TROMETHAMINE 15 MG: 15 INJECTION, SOLUTION INTRAMUSCULAR; INTRAVENOUS at 11:02

## 2023-02-09 RX ADMIN — DIAZEPAM 10 MG: 5 TABLET ORAL at 12:02

## 2023-02-09 RX ADMIN — SODIUM CHLORIDE: 9 INJECTION, SOLUTION INTRAVENOUS at 09:02

## 2023-02-09 RX ADMIN — QUETIAPINE FUMARATE 300 MG: 300 TABLET ORAL at 02:02

## 2023-02-09 RX ADMIN — LORAZEPAM 2 MG: 0.5 TABLET ORAL at 09:02

## 2023-02-09 RX ADMIN — DIAZEPAM 5 MG: 5 TABLET ORAL at 09:02

## 2023-02-09 RX ADMIN — SODIUM CHLORIDE: 9 INJECTION, SOLUTION INTRAVENOUS at 03:02

## 2023-02-09 RX ADMIN — OXYCODONE HYDROCHLORIDE 10 MG: 10 TABLET ORAL at 07:02

## 2023-02-09 RX ADMIN — LOSARTAN POTASSIUM 50 MG: 50 TABLET, FILM COATED ORAL at 08:02

## 2023-02-09 RX ADMIN — THERA TABS 1 TABLET: TAB at 08:02

## 2023-02-09 RX ADMIN — LORAZEPAM 2 MG: 0.5 TABLET ORAL at 01:02

## 2023-02-09 RX ADMIN — KETOROLAC TROMETHAMINE 15 MG: 30 INJECTION, SOLUTION INTRAMUSCULAR; INTRAVENOUS at 06:02

## 2023-02-09 RX ADMIN — ACETAMINOPHEN 1000 MG: 500 TABLET ORAL at 09:02

## 2023-02-09 RX ADMIN — PANTOPRAZOLE SODIUM 40 MG: 40 TABLET, DELAYED RELEASE ORAL at 08:02

## 2023-02-09 RX ADMIN — SODIUM CHLORIDE: 9 INJECTION, SOLUTION INTRAVENOUS at 04:02

## 2023-02-09 RX ADMIN — THIAMINE HCL TAB 100 MG 100 MG: 100 TAB at 08:02

## 2023-02-09 RX ADMIN — ONDANSETRON 8 MG: 8 TABLET, ORALLY DISINTEGRATING ORAL at 08:02

## 2023-02-09 RX ADMIN — OXYCODONE 5 MG: 5 TABLET ORAL at 03:02

## 2023-02-09 RX ADMIN — FOLIC ACID 1 MG: 1 TABLET ORAL at 08:02

## 2023-02-09 RX ADMIN — HYDROMORPHONE HYDROCHLORIDE 0.5 MG: 1 INJECTION, SOLUTION INTRAMUSCULAR; INTRAVENOUS; SUBCUTANEOUS at 03:02

## 2023-02-09 RX ADMIN — ENOXAPARIN SODIUM 40 MG: 40 INJECTION SUBCUTANEOUS at 05:02

## 2023-02-09 RX ADMIN — HYDROMORPHONE HYDROCHLORIDE 0.2 MG: 1 INJECTION, SOLUTION INTRAMUSCULAR; INTRAVENOUS; SUBCUTANEOUS at 08:02

## 2023-02-09 RX ADMIN — HYDRALAZINE HYDROCHLORIDE 25 MG: 25 TABLET, FILM COATED ORAL at 12:02

## 2023-02-09 RX ADMIN — QUETIAPINE FUMARATE 400 MG: 200 TABLET ORAL at 11:02

## 2023-02-09 RX ADMIN — ATORVASTATIN CALCIUM 80 MG: 40 TABLET, FILM COATED ORAL at 08:02

## 2023-02-09 RX ADMIN — KETOROLAC TROMETHAMINE 15 MG: 30 INJECTION, SOLUTION INTRAMUSCULAR; INTRAVENOUS at 10:02

## 2023-02-09 RX ADMIN — HYDROMORPHONE HYDROCHLORIDE 0.5 MG: 1 INJECTION, SOLUTION INTRAMUSCULAR; INTRAVENOUS; SUBCUTANEOUS at 09:02

## 2023-02-09 RX ADMIN — DIAZEPAM 5 MG: 5 TABLET ORAL at 08:02

## 2023-02-09 NOTE — PSYCH EVALUATION
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 11/21/2022    OPIATESCREEN Negative 11/21/2022    COCAINEMETAB Negative 11/21/2022    AMPHETAMINES Negative 11/21/2022    MARIJUANATHC Negative 11/21/2022    PCDSOPHENCYN Negative 11/21/2022    ALCOHOLMEDIC <10 02/09/2023       Results for orders placed or performed during the hospital encounter of 02/08/23   EKG 12-lead    Collection Time: 02/08/23  5:48 PM    Narrative    Test Reason : R10.13,    Vent. Rate : 104 BPM     Atrial Rate : 104 BPM     P-R Int : 148 ms          QRS Dur : 088 ms      QT Int : 354 ms       P-R-T Axes : 059 014 040 degrees     QTc Int : 465 ms    Sinus tachycardia  Nonspecific T wave abnormality  Abnormal ECG  When compared with ECG of 23-DEC-2022 21:05,  Nonspecific T wave abnormality now evident in Lateral leads  Confirmed by DADA BAKER MD ([id number]) on 2/9/2023 8:01:53 AM    Referred By: THOR   SELF           Confirmed By:DADA BAKER MD       No results found for this or any previous visit.    CONSULTATION:     A diagnostic psychiatric evaluation was performed and responsiveness to treatment was assessed.  The patient demonstrates adequate ability/capacity to respond to treatment.    Consults    {XX-Courtesy:[id number]}

## 2023-02-09 NOTE — ASSESSMENT & PLAN NOTE
37 year old man with a history of alcohol induced pancreatitis, alcohol dependence, bipolar disorder, HTN, who presented for admission with complaints of epigastric pain, nausea and vomiting that started two days prior to admission. Labs are significant for elevated lipase, transaminases and history of heavy alcohol use in the past few weeks.     He received 2L IV fluid bolus in the ED. CT imaging confirms acute pancreatitis, hepatomegaly, hepatic steatosis    Plan:  - Continue aggressive IV fluids  - Aggressive pain regimen: scheduled Tylenol, PRN oxy 10 q6h, PRN ketorolac 10 q6h, breakthrough IV dilaudid 0.5 q6h  - CLD as tolerated  - CT imaging does not show evidence of necrosis, cyst requiring antbiotics at this time

## 2023-02-09 NOTE — DISCHARGE INSTRUCTIONS
REFERRAL RECOMMENDATIONS FOR SUBSTANCE ABUSE & MENTAL HEALTH      IN CASE OF SUICIDAL THINKING, call the National Suicide Hotline Number: 988    988 Suicide & Crisis Lifeline: 989 , 6-046-030-GLWZ (3355)  https://98899taojin.com.PlayyOn       SUBSTANCE ABUSE:     [facility] (formerly known as the ABU)  [x] [phone], Option 2  [x] [address] [facility] 4th [facility], [place] [address]  [x] [email or web address]  [x] The [facility] delivers comprehensive and collaborative treatment for alcohol and substance use disorders.  Excellent program for working professionals or anyone else seeking recovery.  [x] Requires insurance approval prior to starting program, call number above for more information.  [x] Intensive Outpatient Rehabilitation Program - M-F 9am-3pm - daily groups with psychologists and social workers, sessions with MDs 3x per week   [x] Ambulatory detox and dual diagnosis available      SUBOXONE:     NOTE: some Suboxone clinics require their clients to participate in a structured program (such as an IOP) in order to be prescribed Suboxone.  Some clinics have a long waiting list.  Most of these clinics do not accept walk-in clients, so call first to to learn what must be done to get started on Suboxone.    [facility] - [phone] (can do Sublocade)  [address] [facility], [place] [address]    [address]  [phone]    [facility] - [phone] (can do Sublocade)  [address] [place] [address]    [facility]  [address] [place]  [phone]     [facility] (very short waiting list, may accept some walk-in's but call first if possible)  [address], [place] [address]  [phone]; [phone]    [facility]   [address] [place]    [phone]    [facility] (can usually be seen within a week but is cash only  for appointment)  [address] [facility] ([facility])  [address] [place]  [phone]    [facility])  [address] [facility] [address]  [phone]    [place], [place]:    [facility] - [address] [place] [address] - Tel: [phone]    Jey Julianna - [address] [place] [address] - Tel: [phone]    Juan Carlos Harding - [address] [place] [address] - Tel: [phone]    Juan Garcia - [address] [place] [address] - Tel: [phone]    Julio Song - [address] [place] [place] [address] - Tel: [phone]    [place], [place]:     Dr. Becky Gonzáles and Dr. Mike Ng - [address] [place], [place] - Tel: [phone]    Dr. Maria L Corral - [address] [facility] [place], [place] - Tel: [phone]    Dr. Luis Rodrigues - Tel: [phone]    Dr. Perfecto Mejia - [facility] - [phone]      METHADONE:     [facility] (the only methadone clinic in the [facility], has two locations)  [x] [place] - [address] [place] [address], [phone]  [x] [facility] [place] [address], [phone]      12 STEP PROGRAMS (and similar):     Alcoholics Anonymous (local)  [x] [phone]  [x] www.[place].org for schedules for in-person and online meetings  [x] There are AA meetings throughout the day all over [facility]  [x] AA costs nothing to attend; they pass a basket for donations but this is not required    Narcotics Anonymous  [x] [phone]  [x] www.noana.org  [x] There are NA meetings throughout the day all over town  [x] NA costs nothing to attend; they pass a basket for donations but this is not required    Alcoholics Anonymous Online Intergroup (national)  [x] www.aa-intergroup.org  [x] Good resource for large, nation-wide meetings  [x] Can also attend smaller, local meetings in other cities  [x] Countless meetings all day and all night  [x] AA costs nothing to attend; they pass a basket for donations  but this is not required    LOOKING FOR AN ALTERNATIVE TO 12 STEP PROGRAMS - check out:  SMART Recovery: https://www.smartrecovery.org/about-us  Ramu Recovery: https://recoverydharma.org      DETOX UNITS (USUALLY 5-7 DAYS):     [facility]: [address], [facility]  [phone], call first to ensure bed availability    [facility]: [address] [facility]  [phone], Option 1, call first to ensure bed availability    [facility]: [address] , [facility]  [phone] (intake by appointment only)    [facility]: [address], [facility]  [phone]      INTENSIVE OUTPATIENT PROGRAMS:     [facility] (formerly known as the ABU)  [x] [phone], Option 2  [x] [address] [facility] [address]iel [address] 4th Floor, [facility] [address]  [x] [email or web address]://[email or web address]/services/[email or web address]recovery-[facility]  [x] [facility] delivers comprehensive and collaborative treatment for alcohol and substance use disorders.  Excellent program for working professionals or anyone else seeking recovery.  [x] Requires insurance approval prior to starting program, call number above for more information.  [x] Intensive Outpatient Rehabilitation Program - M-F 9am-3pm - daily groups with psychologists and social workers, sessions with MDs 3x per week   [x] Ambulatory detox and dual diagnosis available    [facility] Int[facility]patient Program  [x] [phone]  [x] [address] [facility] (the clinic not [facility])  [x] Call number above for more info and to check insurance requirements    [facility]  [address] [place], [place] [address] [phone]    [facility]:  [address] [facility], [address] [place], [place] [address]?, [phone]  [address] [facility]?, [place], [place] [address]?, [phone]    RESIDENTIAL REHABS (USUALLY 28 DAYS):     [facility]: [address] [facility] [phone]    [facility]:  [address] [place] [address]  [phone] (intake by appointment only)    [facility] (men only) [address] [place] [address], [phone]    [facility] (Female only) [address] [place] [address], [phone]    [facility]: [address], [place], men's program [phone], women's program [phone]    [facility]: [address] [place] [address], [phone]    [facility]: [address] [place], [place], [phone]    [facility]: Men only, [phone], [address] [place]    [facility]: [address], [place], [place], [phone]    Counts include 234 beds at the [facility]: [address] [place] [address],  [phone]  New Location: [address] [place] [address] [address], [place] [address], [phone]    [facility]:   ?[address]?[place], [place] [address]?[phone]    [place]: [address] [place] [address], [phone]    [place]: [place], [place], [phone]     [facility]: [place], [place], [phone]    [facility]: [place], [place], [phone]    [facility]: [place], [place], [phone]    [place]: [place], [phone]    [facility]: [address] [facility], [place] [address], [phone]    COMMUNITY ADDICTION CLINICS:     [facility]: [address] [facility], [address] [place], [phone] -or- [address] [place] [address]    [facility]: [address], [place]  [address]     [facility]: Clinics [phone]; Crisis [phone]    [facility]: [address] [place] [address]    [facility]: [address] [place] [address]    [facility]: [address] [place] [address],    [facility]: 2nd Floor [address] [facility]drop[place] [address]    [facility]: [address], [place] [address]    [place]  [facility], [address], [place] [address]    [facility]: [address] [facility], [place] [phone]  (serves youth 16-23 years old)    [facility]: [facility]. [facility]/[place] [phone]    [facility]: [address] [facility], [place] [phone]    [facility]: [address], [place] [phone]    [facility]: [address] [facility], [address], [phone]     [facility]: [address] [facility] [place], [phone], [phone]    RESOURCES IN OTHER [facility]:     [facility]: [address] [place], [phone], [phone]    [facility]: [address] [facility], [address], [phone]    [facility], [address] [place] [address], [phone]    [facility]: [address], [phone]    [facility], [address] [facility], [phone] ([facility])    [facility], [address] [facility], [phone] ([facility])    [facility], [address] [facility], [address], [place], [phone] ([place], [place], and [facility])    [facility], [address] , [place], [phone] ([facility])    [facility] Hotline [phone], [phone]    [facility], [address] [facility], [facility], [address] [facility], [facility], [address] [facility], [facility], 500 [facility] B., [facility], [address], [place]    [facility], [address], [place] [phone]    [facility]  "[facility], [address] [facility] [phone]    [facility], [address] [facility], [phone]    [facility], [address] [facility], [phone]      [place] SPEAKING (en español):     Información de la reunión de Alcohólicos Anónimos  [facility], 10:00 am  Habla español  Esta reunión está abierta y cualquiera puede asistir.    Djiboutian speaking Alcoholics anonymous meetings:  El "Mejia [place] AA Skype" es un mejia on line de Alcohólicos Anónimos en [facility]. El mejia es estuardo, gratuito y virtual a través de Skype Audio. El mejia funciona mediante lance llamada grupal de voz, por lo que no se utiliza la videollamada, ni se pueden gregg las imágenes o rostros de los participantes. Hace carla años y medio abrimos el primer Mejia de AA por Skype en [place], richelle actualmente asisten personas desde [place], [place], [place], [place], [place], [place], [place], [place], [place], [place], [place] y [place], entre otros.    El mejia es muy útil para los alcohólicos que residen en lugares donde no se celebran reuniones de AA, o residen en lugares donde las reuniones de AA son un número limitado de días a la semana, o para aquellos compañeros que se hayan de viaje o que, por cualquier motivo, se hayan convalecientes y no pueden desplazarse. Todos los días nos reuniones a las 21:00 (hora española)    Podéis obtener más información sobre el mejia y selene sesiones en la página web [email or web address]      MENTAL HEALTH:     [facility] Out[facility]  [phone]    [facility] - General Psychiatry Intensive Outpatient Program  [facility] (formerly known as the [facility])  [phone], option 3    [facility] - Dual Diagnosis Intensive Outpatient Program  [facility] (formerly " known as the [facility])  [phone], option 2      COMMUNITY MENTAL HEALTH CENTERS:     [facility]  (aka [facility])  Serves [facility] residents.  Serves uninsured patients & those with Medicaid.  Main location: [address] [place], [place] [address] [phone]  Walk-in's available during regular business hours.  24/7 Crisis Line: [phone]    [facility]  (aka [facility])  Serves [facility].  Serves uninsured patients, those with Medicaid and some private plans.  Walk-in's available during regular business hours.  Primary care services available as well.  [facility]: [address] [facility] [place] [address];  [phone]  [place]: [address] [place] [address];  [phone]  24/7 Crisis Line: [phone]    [facility]  Serves uninsured patients & those with Medicaid, call for more info.  Primary care, pediatrics, HIV treatment, and dentistry services available as well.  Three locations.  [phone]    [facility]Corporate Office  Serves patients with Medicaid, Medicare, and private insurance  [address]  [place],?[place] [address] [phone]    [facility]  Serves uninsured on a sliding scale, as well as Medicaid, Medicare, and private plans.  Eight locations around the [facility] area.  [phone]    [facility]  Serves uninsured patients & those with Medicaid, private insurances.  Primary care available as well.  [phone]  [facility] [place] [address]    [facility]  Serves veterans who were honorably discharged.  [address] [place] [address]  [phone]  24/7 Veterans Crisis Line: [phone] (Press 1)    If you have private insurance and need to find a  specialist, please contact your insurance network to request a list of providers covered by your benefits.      MENTAL HEALTH/ADDICTIVE DISORDERS:     AA (086-6455), NA (180-2024)   National Suicide Prevention Lifeline- Call 1-102.235.1358 Available 24 hours everyday  [facility] 646-5287; Crisis Line 207-8894 - Call for options A-F:  Intensive Outpatient Treatment/ Day programs   [facility], please contact   [facility], please contact [phone] or [phone] to speak with an admissions counselor.  [facility] (Methadone Maintenance)   [address] [place] [address], [phone]  [address], [place] [address] [phone]  [facility], [address] [place] [address], [phone]  [facility] [phone]  [place] please contact [phone]  [facility], [address] [facility], 4th [place] [address] Phone: [phone]   [facility]cer  [place] Office: [address] [address], [phone]  [place] Office: [address] [place] [address] B, [place] [address], [phone]  [place] Office: [address] [place] [address] [phone]    Outpatient Substance Abuse Treatment   [facility] (Methadone Maintenance)   [address] [place] [address], [phone]  [address], [place] [address] [phone]  [facility], [address] [address], [phone]  Acer  [place] Office: [address] [address], [phone]  [place] Office: [facility], [address], [place] [address], [phone]  [place] Office: [address] [place] [address] [phone]  [place] [facility], [address] [place] [address] [phone]   [facility], [address] [facility], [address], [phone]  [place]  [facility], [address] [place], [place] [phone]    Residential Substance Abuse Treatment   [facility] [address] [facility], [phone] or [phone]  [facility], [address] [facility], [phone]  [facility] (men only) [address] [place] [address], [phone]  [facility] at the [facility] (women only) [address] [place] [address] [phone]  [facility], [address] [place] [address] [phone]  [facility] (women only), intakes at [address] [facility], [phone]  [facility]onsibility [place] (7-day program, $100, [address], [phone], [phone], [phone])  [facility] (Men only, [phone]), [address], [place] (Vets*/Non-Vets)  [facility] (Men only, $400/month program fee) 15[address] [facility], [phone]  [facility] (Women over age 39 only), [address] [facility], [phone]    Out of Area:    [place], [address] [place] [address], [place] [phone])  [facility] (men only), [address] [facility] [address], [phone]  [facility], [address] [place], [place] [phone])  [place], [facility] [place], [place] ([phone])  [facility], 111 [facility], [phone]  [facility] (Women only, has to have mental illness, can be homeless or substance abuser), [phone]        DOMESTIC VIOLENCE RESOURCES:     Advocacy  [facility] ([facility])  [address] [place] [address]    NOFJC ? [phone]  Services provided: emergency shelter, individual advocacy, information and referrals, group support, children's program, medical advocacy, forensic medical exams, primary care, legal assistance, counseling, safety planning, and caregiver support    [facility] HEALING AND EMPOWERMENT [place]  Confidential location  [facility] ? [phone]  Services provided: short term emergency shelter, all services provided are free of charge    [facility] FOR COMMUNITY ADVOCACY  Multiple locations in [facility], [place], [facility], [place], and [facility] ([place], [place], and [place])    [place] ? [phone]  Services provided: emergency shelter, individual advocacy, information and referrals, group support, children's program, medical advocacy, legal assistance in obtaining restraining orders, counseling, safety planning, and caregiver support    [facility]   Emergency Shelter   [phone]  Emergency Services ,Legal and Financial Assistance Services ,Housing Services ,Support Services     [facility] long-term   [phone]  Emergency Services ,Counseling Services , Housing Services ,Support Services ,Children's Services     WOMEN WITH A VISION  [address] [place] [address]  WWAV ? [phone]  Services provided: advocacy, health education and supportive services, specializing in free healing services for marginalized groups, including LGBTQ individuals and sex workers    SEXUAL TRAUMA AWARENESS AND RESPONSE (STAR)  [address] [place] [address]    STAR ? (347) 425-STAR  Services provided: individual advocacy, information and referrals, group support, medical advocacy, legal assistance, counseling, and safety planning for survivors of sexual assault    [facility] ([facility])  [address] [place] [address]  [facility] Forensic Program ? [phone]  Services provided: free forensic medical exams for sexual assault and domestic violence, which can be performed up to 5 days after an incident. It is not necessary to make a police report to receive a forensic medical exam    Legal  [facility]  [address] [address]  Project SAVE ? [phone]  Services provided: free emergency legal representation for survivors of doemstic  violence residing in [facility]. Legal services may include temporary restraining orders, temporary child support, custody, and use of property    [facility] LEGAL SERVICES (SLLS)  [address], [place] [address]  SLLS ? [phone]  Services provided: free legal representation for survivors of domestic violence residing in [facility]. Legal services may include temporary child support, custody, and divorce      HOTLINES:     [facility] DOMESTIC VIOLENCE HOTLINE  [phone]    Services provided: free and confidential hotline for victims and survivors of domestic violence. All calls will be routed to a domestic violence service provided in the victim or survivor's area    NATIONAL HUMAN TRAFFICKING HOTLINE  ([phone] 339-8837    Services provided: national anti-trafficking hotline serving victims and survivors of human trafficking. Provides information about local resources, and access to safe space to report tips, seek services, and ask for help    VIA LINK  211 or [phone]    Service provided: counselors can provide crisis counseling. Counselors can also provide information and referrals to programs which can help with needs such as food, shelter, medical care, financial assistance, mental health services, substance abuse treatment, senior services, childcare, and more      HOMELESS SHELTERS:      Homeless shelters  [facility]  Emergency shelter for individuals and families  [address]  [phone]  [facility]  Emergency shelter for men only  Meals daily 6am, 2pm, & 6pm  Clothing, case management M-F by appointment (ID/job/housing/legal assistance), mail  [address] [facility]  [phone]  [facility]  Emergency shelter for men  [address] [facility]  [phone]  Emergency shelter for women  [address] [facility]  [phone]  Breakfast & lunch daily, dinner M-F  Case management, job counseling services   [facility]  Emergency shelter for  teens and young adults up to 22yo  [address]  [phone]  [place] [facility]  Emergency shelter for women over 19yo and their kids  [address] [place] [address]  [phone]  [facility]  Day program, meals M-F 1PM (arrive early)  Showers, laundry, hygiene kits, showers, phones, , notary services, case management, ID assistance  [address] [facility]   [phone] M-F 8am-2:30pm  [facility]  Day program  MTWF 7:30am-3:30pm,  8:30am-3:30pm  Crisis intervention, employment assistance, food/clothing, hygiene kits, bus tokens, mail  [address] [facility] [address]  [phone]  [facility]  Mobile outreach for homeless persons in [facility]  [phone]  [facility]  Primary healthcare, case management, dental services, TB placement  Call ahead  [address] [facility] 2nd [address]  [phone]  [facility]  Connects homeless people with their loved ones in other cities by providing transportation costs   [phone]      [place] RESOURCES:     [place] [facility] Mental Health Crisis Response Team:    Region 12 ([place], [place], [place], and [facility]) ([facility] and [facility])  [phone]      Outpatient Mental Health & Addiction Clinic Resources for both [facility] and [facility]:    [facility] Mental [facility] Resources  Website: [email or web address]  Main Number: 009-227-8652    [facility] ([facility])  [address] ([address] [facility]) [place] [address]  [phone]    [facility] ([facility])  [address] ([address]) [place] [address] [phone]    [facility] [place]  [address] ([address]) [place] [address] [phone]    [facility]  [address] ([address] [facility]) [place] [address] [phone]      Addiction Treatment Resources for both [facility] and  [facility]:    [facility] (Detox, Residential, PHP, IOP, and Aftercare Programs)  [address] [place] [address]  [phone]    [facility] (Residential, IOP, Transitional Living, and Aftercare Programs)  [address] [place] [address]  [phone]    [facility] (Inpatient, Residential, Detox, IOP, Outpatient, and Aftercare Programs)  [address] [place] [address]  [phone] or toll free at [phone]      Outpatient Mental Health Psychotherapy Resources for both [facility] and [facility]:    Stella Monroe, [facility]  [address]  [place] [address]  [phone]  Specialties: Depression, Anxiety, and Life Transitions    Navya Cheatham, PhD  [address] [address]  [address]  [place] [address]  [phone]  Specialties: Testing and Evaluation, Education and Learning Disabilities, and ADHD    Mendy Hein, [facility] [address] [place] [address]  [phone]  Specialties: Obsessive-Compulsive (OCD), Depression, and Relationship Issues    Any Kiran [facility] [address] [place] [address]  [phone]  Specialties: Trauma & PTSD, Mood Disorders, and Anxiety    Any Aldana, PhD, [facility] [address] [address]  [phone]  Specialties: Family Conflict, Child, and Relationship Issues    Deya Daley [facility] [place] [address] [phone]  Specialties: Anxiety, Depression, and Anger Management        IN CASE OF SUICIDAL THINKING, call the National Suicide Hotline Number: 988    988 Suicide & Crisis Lifeline: 982 , 1-154-627-TALK (4834)  Provides 24/7, free and confidential support for people in distress, prevention and crisis resources for you or your loved ones, and best practices for professionals.    Call,  text or chat.  https://[employer].org

## 2023-02-09 NOTE — SUBJECTIVE & OBJECTIVE
Past Medical History:   Diagnosis Date    Alcohol dependence with withdrawal 06/28/2022    Alcoholic pancreatitis 07/2022    Bipolar affective disorder in remission 06/15/2017    ?Dx    Hypertension     Hypertriglyceridemia 06/28/2022    Insomnia     Sleep apnea 11/22/2022       History reviewed. No pertinent surgical history.    Review of patient's allergies indicates:   Allergen Reactions    Ativan [lorazepam] Other (See Comments)     Muscle spasms in neck & back when taking medication.       No current facility-administered medications on file prior to encounter.     Current Outpatient Medications on File Prior to Encounter   Medication Sig    QUEtiapine (SEROQUEL XR) 300 MG Tb24 Take 600 mg by mouth every evening.    atorvastatin (LIPITOR) 80 MG tablet Take 1 tablet (80 mg total) by mouth once daily.    chlordiazepoxide (LIBRIUM) 25 MG Cap Take 1 capsule (25 mg total) by mouth 3 (three) times daily as needed (Alcohol withdrawl).    losartan (COZAAR) 50 MG tablet Take 50 mg by mouth once daily.    pantoprazole (PROTONIX) 40 MG tablet Take 1 tablet (40 mg total) by mouth once daily.     Family History    None       Tobacco Use    Smoking status: Some Days     Types: Vaping with nicotine    Smokeless tobacco: Current     Types: Chew   Substance and Sexual Activity    Alcohol use: Yes     Comment: Daily beer, liquor, wine daily, last use `1000    Drug use: No    Sexual activity: Yes     Review of Systems   Constitutional:  Negative for chills and fever.   HENT:  Negative for congestion and sore throat.    Eyes:  Negative for pain and visual disturbance.   Respiratory:  Negative for cough and shortness of breath.    Cardiovascular:  Negative for chest pain and leg swelling.   Gastrointestinal:  Positive for abdominal pain and nausea. Negative for diarrhea and vomiting.   Genitourinary:  Negative for dysuria and hematuria.   Musculoskeletal:  Negative for arthralgias and back pain.   Skin:  Negative for color change  and pallor.   Neurological:  Positive for headaches. Negative for light-headedness.   Psychiatric/Behavioral:  Negative for agitation and confusion.    Objective:     Vital Signs (Most Recent):  Temp: 98.5 °F (36.9 °C) (02/09/23 0017)  Pulse: 99 (02/09/23 0017)  Resp: 16 (02/09/23 0342)  BP: (!) 158/102 (02/09/23 0017)  SpO2: 95 % (02/09/23 0017)   Vital Signs (24h Range):  Temp:  [98.5 °F (36.9 °C)-99.5 °F (37.5 °C)] 98.5 °F (36.9 °C)  Pulse:  [] 99  Resp:  [15-18] 16  SpO2:  [95 %-97 %] 95 %  BP: (148-178)/(102-105) 158/102     Weight: (!) 149.7 kg (330 lb)  Body mass index is 41.25 kg/m².    Physical Exam  Vitals reviewed.   Constitutional:       General: He is not in acute distress.     Appearance: He is ill-appearing.   HENT:      Head: Normocephalic and atraumatic.      Mouth/Throat:      Mouth: Mucous membranes are moist.      Pharynx: Oropharynx is clear.   Eyes:      Extraocular Movements: Extraocular movements intact.      Pupils: Pupils are equal, round, and reactive to light.   Cardiovascular:      Rate and Rhythm: Regular rhythm. Tachycardia present.   Pulmonary:      Effort: Pulmonary effort is normal.      Breath sounds: Normal breath sounds.   Abdominal:      General: Bowel sounds are normal.      Palpations: Abdomen is soft.      Tenderness: There is abdominal tenderness.   Musculoskeletal:         General: Normal range of motion.      Right lower leg: No edema.      Left lower leg: No edema.   Skin:     General: Skin is warm and dry.      Capillary Refill: Capillary refill takes less than 2 seconds.   Neurological:      Mental Status: He is alert and oriented to person, place, and time. Mental status is at baseline.         CRANIAL NERVES     CN III, IV, VI   Pupils are equal, round, and reactive to light.     Significant Labs: All pertinent labs within the past 24 hours have been reviewed.    Significant Imaging: I have reviewed all pertinent imaging results/findings within the past 24  hours.

## 2023-02-09 NOTE — H&P
[facility] - Emergency Dept  [facility] Medicine  History & Physical    Patient Name: Lola Wetzel  MRN: [id number]  Patient Class: OP- Observation  Admission Date: 2/8/2023  Attending Physician: Ryan Rivera MD   Primary Care Provider: Janet Alaniz DO         Patient information was obtained from patient and ER records.     Subjective:     Principal Problem:Acute pancreatitis    Chief Complaint:   Chief Complaint   Patient presents with    Abdominal Pain     Pt c/o generalized abdominal pain.  Vomiting x few days.  Pt states he has been drinking heavily the past few weeks.  Last drink was this am        HPI: Mr. Wetzel is a 37 year old man with a history of alcoholic pancreatitis, alcohol use, bipolar disorder, HTN who presents for admission with complaints of abdominal pain, nausea for the past two days. Patient states that he has been drinking heavily over the past few weeks, increased in the past week. He started having epigastric pain, nausea and vomiting for he past two days that has not abated, prompting him to seek care. He has had similar symptoms in the past. He states he has been dealing with a headache, but denies any tremors, vision changes, hallucinations. He feels he is starting to have some symptoms of withdrawal. He denies a history of seizures with withdrawals and states that he does not react well to lorezapam, developing severe muscle and back spasms the last time he received it. He sees an outpatient addiction psych medicine physician and states that he receives shots to help with his drinking. He has been drinking 48 oz of beer and 1.5L wine daily in addition to hard liquor. Previous notes indicate that he was drinking around 1.5 gallons of alcohol and trying to cut down. His last drink was around 10am the morning of admission.  He denies any tobacco use, drug use. 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 He was last admitted for alcoholic pancreatitis and withdrawal in November 2022. He has been seen in the ED three times since then for concerns of pancreatitis and withdrawal.  He denies any fever, chills, dysuria, diarrhea, body aches.    In the ED, vital signs stable. Labs significant for elevated LFTs with , ALT 84, lipase 158. CT imaging showed hepatic steatosis, pancreatitis.       Past Medical History:   Diagnosis Date    Alcohol dependence with withdrawal 06/28/2022    Alcoholic pancreatitis 07/2022    Bipolar affective disorder in remission 06/15/2017    ?Dx    Hypertension     Hypertriglyceridemia 06/28/2022    Insomnia     Sleep apnea 11/22/2022       History reviewed. No pertinent surgical history.    Review of patient's allergies indicates:   Allergen Reactions    Ativan [lorazepam] Other (See Comments)     Muscle spasms in neck & back when taking medication.       No current facility-administered medications on file prior to encounter.     Current Outpatient Medications on File Prior to Encounter   Medication Sig    QUEtiapine (SEROQUEL XR) 300 MG Tb24 Take 600 mg by mouth every evening.    atorvastatin (LIPITOR) 80 MG tablet Take 1 tablet (80 mg total) by mouth once daily.    chlordiazepoxide (LIBRIUM) 25 MG Cap Take 1 capsule (25 mg total) by mouth 3 (three) times daily as needed (Alcohol withdrawl).    losartan (COZAAR) 50 MG tablet Take 50 mg by mouth once daily.    pantoprazole (PROTONIX) 40 MG tablet Take 1 tablet (40 mg total) by mouth once daily.     Family History    None       Tobacco Use    Smoking status: Some Days     Types: Vaping with nicotine    Smokeless tobacco: Current     Types: Chew   Substance and Sexual Activity    Alcohol use: Yes     Comment: Daily beer, liquor, wine daily, last use `1000    Drug use: No    Sexual activity: Yes     Review of Systems   Constitutional:  Negative for chills and fever.   HENT:  Negative for congestion and  sore throat.    Eyes:  Negative for pain and visual disturbance.   Respiratory:  Negative for cough and shortness of breath.    Cardiovascular:  Negative for chest pain and leg swelling.   Gastrointestinal:  Positive for abdominal pain and nausea. Negative for diarrhea and vomiting.   Genitourinary:  Negative for dysuria and hematuria.   Musculoskeletal:  Negative for arthralgias and back pain.   Skin:  Negative for color change and pallor.   Neurological:  Positive for headaches. Negative for light-headedness.   Psychiatric/Behavioral:  Negative for agitation and confusion.    Objective:     Vital Signs (Most Recent):  Temp: 98.5 °F (36.9 °C) (02/09/23 0017)  Pulse: 99 (02/09/23 0017)  Resp: 16 (02/09/23 0342)  BP: (!) 158/102 (02/09/23 0017)  SpO2: 95 % (02/09/23 0017)   Vital Signs (24h Range):  Temp:  [98.5 °F (36.9 °C)-99.5 °F (37.5 °C)] 98.5 °F (36.9 °C)  Pulse:  [] 99  Resp:  [15-18] 16  SpO2:  [95 %-97 %] 95 %  BP: (148-178)/(102-105) 158/102     Weight: (!) 149.7 kg (330 lb)  Body mass index is 41.25 kg/m².    Physical Exam  Vitals reviewed.   Constitutional:       General: He is not in acute distress.     Appearance: He is ill-appearing.   HENT:      Head: Normocephalic and atraumatic.      Mouth/Throat:      Mouth: Mucous membranes are moist.      Pharynx: Oropharynx is clear.   Eyes:      Extraocular Movements: Extraocular movements intact.      Pupils: Pupils are equal, round, and reactive to light.   Cardiovascular:      Rate and Rhythm: Regular rhythm. Tachycardia present.   Pulmonary:      Effort: Pulmonary effort is normal.      Breath sounds: Normal breath sounds.   Abdominal:      General: Bowel sounds are normal.      Palpations: Abdomen is soft.      Tenderness: There is abdominal tenderness.   Musculoskeletal:         General: Normal range of motion.      Right lower leg: No edema.      Left lower leg: No edema.   Skin:     General: Skin is warm and dry.      Capillary Refill: Capillary  refill takes less than 2 seconds.   Neurological:      Mental Status: He is alert and oriented to person, place, and time. Mental status is at baseline.         CRANIAL NERVES     CN III, IV, VI   Pupils are equal, round, and reactive to light.     Significant Labs: All pertinent labs within the past 24 hours have been reviewed.    Significant Imaging: I have reviewed all pertinent imaging results/findings within the past 24 hours.    Assessment/Plan:     * Acute pancreatitis  37 year old man with a history of alcohol induced pancreatitis, alcohol dependence, bipolar disorder, HTN, who presented for admission with complaints of epigastric pain, nausea and vomiting that started two days prior to admission. Labs are significant for elevated lipase, transaminases and history of heavy alcohol use in the past few weeks.     He received 2L IV fluid bolus in the ED. CT imaging confirms acute pancreatitis, hepatomegaly, hepatic steatosis      Plan:  - continue aggressive IV fluids  - given IV pain medication for abdominal pain,  - CT imaging does not show evidence of necrosis, cyst requiring antbiotics at this time        Hypertension  Hx of hypertension, patient states he has not been taking antihypertensive medications. Blood pressure elevated on admission.     Restart losartan 50mg daily      Elevated lipase  See acute pancreatitis      Alcohol dependence with withdrawal  Hx of alcohol abuse, hospitalized in the past year for pancreatitis and alcohol withdrawal. Patient states he has an outpatient physician that gives him shots to help reduce his alcohol use. He was previously drinking 1.5 gallons of alcohol in a day, now transitioned from hard liquor to more wine and beer. Patient denies history of withdrawal seizures but has had a bad reaction to lorazepam in the past developing significant muscle spasms.    Plan:  - CIWA protocol q4h  - Scheduled diazepam 5 mg Q12h  - PRN diazepam Q6h for breakthrough withdrawal  symptoms  - Maintaining clear liquid diet, advance as tolerated  - PRN nausea medications  - multivitamin, thiamine 100 mg daily  - f/u consult to addiction psych      Bipolar affective disorder in remission  Continue home seroquel        VTE Risk Mitigation (From admission, onward)         Ordered     enoxaparin injection 40 mg  Daily         02/08/23 2214     IP VTE HIGH RISK PATIENT  Once         02/08/23 2214     Place sequential compression device  Until discontinued         02/08/23 2214                   Cande Best MD  Department of Hospital Medicine   [facility] - Emergency Dept

## 2023-02-09 NOTE — ASSESSMENT & PLAN NOTE
Hx of alcohol abuse, hospitalized in the past year for pancreatitis and alcohol withdrawal. Patient states he has an outpatient physician that gives him shots to help reduce his alcohol use. He was previously drinking 1.5 gallons of alcohol in a day, now transitioned from hard liquor to more wine and beer. Patient denies history of withdrawal seizures but has had a bad reaction to lorazepam in the past developing significant muscle spasms.    Plan:  - CIWA protocol q4h  - Scheduled diazepam 5 mg Q12h  - PRN diazepam Q6h for breakthrough withdrawal symptoms  - Maintaining clear liquid diet, advance as tolerated  - PRN nausea medications  - multivitamin, thiamine 100 mg daily  - f/u consult to addiction psych

## 2023-02-09 NOTE — ASSESSMENT & PLAN NOTE
Hx of hypertension, patient states he has not been taking antihypertensive medications. Blood pressure elevated on admission.     Restart losartan 50mg daily

## 2023-02-09 NOTE — ASSESSMENT & PLAN NOTE
37 year old man with a history of alcohol induced pancreatitis, alcohol dependence, bipolar disorder, HTN, who presented for admission with complaints of epigastric pain, nausea and vomiting that started two days prior to admission. Labs are significant for elevated lipase, transaminases and history of heavy alcohol use in the past few weeks.     He received 2L IV fluid bolus in the ED. CT imaging confirms acute pancreatitis, hepatomegaly, hepatic steatosis      Plan:  - continue aggressive IV fluids  - given IV pain medication for abdominal pain,  - CT imaging does not show evidence of necrosis, cyst requiring antbiotics at this time

## 2023-02-09 NOTE — PHARMACY MED REC
"Admission Medication History     The home medication history was taken by Cyndee Silva.    You may go to "Admission" then "Reconcile Home Medications" tabs to review and/or act upon these items.     The home medication list has been updated by the Pharmacy department.   Please read ALL comments highlighted in yellow.   Please address this information as you see fit.    Feel free to contact us if you have any questions or require assistance.      The medications listed below were removed from the home medication list. Please reorder if appropriate:  Patient reports no longer taking the following medication(s):  CHLORDIAZEPOXIDE 25 MG      Medications listed below were obtained from: Patient/family  Current Outpatient Medications on File Prior to Encounter   Medication Sig    QUEtiapine (SEROQUEL) 300 MG Tab   Take 600 mg by mouth every evening.    atorvastatin (LIPITOR) 80 MG tablet   Take 1 tablet (80 mg total) by mouth once daily.    folic acid (FOLVITE) 1 MG tablet   Take 1,000 mcg by mouth once daily.    losartan (COZAAR) 100 MG tablet   Take 100 mg by mouth once daily.    pantoprazole (PROTONIX) 40 MG tablet Take 1 tablet (40 mg total) by mouth once daily.             Potential issues to be addressed PRIOR TO DISCHARGE  Please discuss with the patient barriers to adherence with medication treatment plans  Patient requires education regarding drug therapies     Cyndee Silva  [id number]                  .        "

## 2023-02-09 NOTE — HPI
Mr. Wetzel is a 37 year old man with a history of alcoholic pancreatitis, alcohol use, bipolar disorder, HTN who presents for admission with complaints of abdominal pain, nausea for the past two days. Patient states that he has been drinking heavily over the past few weeks, increased in the past week. He started having epigastric pain, nausea and vomiting for he past two days that has not abated, prompting him to seek care. He has had similar symptoms in the past. He states he has been dealing with a headache, but denies any tremors, vision changes, hallucinations. He feels he is starting to have some symptoms of withdrawal. He denies a history of seizures with withdrawals and states that he does not react well to lorezapam, developing severe muscle and back spasms the last time he received it. He sees an outpatient addiction psych medicine physician and states that he receives shots to help with his drinking. He has been drinking 48 oz of beer and 1.5L wine daily in addition to hard liquor. Previous notes indicate that he was drinking around 1.5 gallons of alcohol and trying to cut down. His last drink was around 10am the morning of admission.  He denies any tobacco use, drug use. He states he feels that his drinking is worse when he has too much time on his hands, he currently works managing short term rental properties. He was last admitted for alcoholic pancreatitis and withdrawal in November 2022. He has been seen in the ED three times since then for concerns of pancreatitis and withdrawal.  He denies any fever, chills, dysuria, diarrhea, body aches.    In the ED, vital signs stable. Labs significant for elevated LFTs with , ALT 84, lipase 158. CT imaging showed hepatic steatosis, pancreatitis.

## 2023-02-09 NOTE — CONSULTS
INITIAL VISIT: ADDICTION PSYCHIATRY CONSULTATION SERVICE      ASSESSMENT AND PLAN:     DIAGNOSES & PROBLEMS:  Alcohol Use Disorder, severe  Unspecified Mood Disorder    In Summary:  Patient is a 38 yo M with alcohol use disorder and bipolar disorder vs unspecified mood disorder presenting with pancreatitis 2/2 alcohol use. Psychiatry was consult for management of alcohol withdrawal.     Plan:  - Recommend to increase to diazepam 10 mg q8h   - Can continue patient's home Seroquel, however, would encourage re-investigation of the need for this high dose in the outpatient setting     - upon discharge, patient should expeditiously contact established outpatient psychiatric or primary care provider for further instructions; it is recommended to follow up with outpatient provider within 1-2 weeks of discharge, but ideally as soon as can be seen by the provider  - continue alcohol withdrawal protocol while patient is in house, including supportive measures,frequent monitoring of vitals and CIWA-Ar, and initiation of PRN +/- standing benzodiazepines to minimize risk of complicated withdrawal  - patient counseled on abstinence from alcohol and substances of abuse (illicit and prescription)  - addiction resource sheet provided to patient in discharge tab       PRESENTATION:     Lola Wetzel presents with the following chief complaint: alcohol and/or drug addiction    Per Chart:  Mr. Wetzel is a 37 year old man with a history of alcoholic pancreatitis, alcohol use, bipolar disorder, HTN who presents for admission with complaints of abdominal pain, nausea for the past two days. Patient states that he has been drinking heavily over the past few weeks, increased in the past week. He started having epigastric pain, nausea and vomiting for he past two days that has not abated, prompting him to seek care. He has had similar symptoms in the past. He states he has been dealing with a headache, but denies any tremors, vision  "changes, hallucinations. He feels he is starting to have some symptoms of withdrawal. He denies a history of seizures with withdrawals and states that he does not react well to lorezapam, developing severe muscle and back spasms the last time he received it. He sees an outpatient addiction psych medicine physician and states that he receives shots to help with his drinking. He has been drinking 48 oz of beer and 1.5L wine daily in addition to hard liquor. Previous notes indicate that he was drinking around 1.5 gallons of alcohol and trying to cut down. His last drink was around 10am the morning of admission.  He denies any tobacco use, drug use. He states he feels that his drinking is worse when he has too much time on his hands, he currently works managing short term rental properties. He was last admitted for alcoholic pancreatitis and withdrawal in November 2022. He has been seen in the ED three times since then for concerns of pancreatitis and withdrawal.  He denies any fever, chills, dysuria, diarrhea, body aches.     In the ED, vital signs stable. Labs significant for elevated LFTs with , ALT 84, lipase 158. CT imaging showed hepatic steatosis, pancreatitis.      Psychiatry consulted for alcohol withdrawal.    Per Patient:    The patient is alert and oriented with abdominal pain he attributes to acute pancreatitis. The patient is cooperative and reports mood as "alright." The patient reports severe abdominal pain for three days prior to ED presentation. On the day of presentation (2/8/23) the patient reports morning drinking (estimated around 10am) of two 24oz hard seltzer cans and a bottle of wine. The patient was at a gun store and while in an Uber home, he felt the pain became so severe he asked the  to take him to the hospital. The patient reports daily alcohol use for the past two weeks up to ED presentation and states that drinking increased in frequency over the past year in response to " various stressors including family conflict. The patient reports that family members have been stealing money from him related to his business renting on "[employer] The patient is not currently experiencing symptoms of withdrawal including nausea, vomiting, shakes seizures or visual or tactile hallucinations but has in the past experienced withdrawal with nausea and visual hallucinations. The patient has several past presentations for acute pancreatitis including one as recently as 06/08/22. The patient participated in alcohol support services via [facility] where he received an long acting injectable of Naltrexone. The patient reports that Naltrexone curbed alcohol cravings and allowed him to remain alcohol free for one month, however, the patient believes the drug stopped working one month after the initial injection which precipitated relapse in early January of this year (2023). The patient is interested in receiving Naltrexone but describes an issue receiving the injection every month. The patient also describes some symptoms of depression over the past year including low mood and low energy.The patient denies SI/AVH but describes some feels of aggression towards family members with no particular target or plan. The patient describes new symptoms of anxiety including excess worry for the past month. The patient is prescribed Seroquel 600mg for bipolar disorder and alludes to some periods of donta including lack of sleep due to elevated mood; however the patient states he does not believe he has bipolar. The patient states he began taking Seroquel for sleep seveal years as he was having difficulty with sleep due to anabolic steroids. Per chart review, the patient began taking Seroquel in 2017. The patient states he no longer uses anabolic steroids. The patient does not express interest in rehab or detox programs or meetings and states that he just wants the Naltrexone shot more frequently with stated interest in  "only following up at [facility].      Collateral:   none      REVIEW OF SYSTEMS:  I[]I Patient denies any pertinent ROS, and none is known.  I[]I Patient unable or unwilling to provide any ROS.    [] Y  [x] N  sleep disturbance:  [] Y  [x] N  appetite/weight change:   [] Y  [x] N  fatigue/anergia:  [] Y  [x] N  impairment in focus/concentration:     [x] Y  [] N  depression:   [x] Y  [] N  anxiety/worry:  [] Y  [x] N  dysregulated mood/behavior:   [x] Y  [] N  manic symptomatology: past history of elevated mood and staying up several night in a row with increased energy  [] Y  [x] N  psychosis:     A pertinent medical review of systems was performed with the following notable findings: abdominal pain, nausea    CURRENT PSYCHOTROPIC REGIMEN:  I[x]I Y  I[]I N  I[]I U    Seroquel 600 mg       ADDICTION:     I[]I Y  I[x]I N  I[]I U  I[]I Current  I[]I Former  Nicotine Use:   I[x]I Y  I[]I N  I[]I U  I[x]I Current  I[]I Former  Alcohol Use:   I[x]I Y  I[]I N  I[]I U  I[x]I Current  I[]I Former  Alcohol Misuse/Abuse:   I[]I Y  I[]I N  I[]I U  I[]I Current  I[]I Former  Illicit Drug Use/Misuse/Abuse:   I[]I Y  I[]I N  I[]I U  I[]I Current  I[x]I Former  Misuse/Abuse of Rx Medications:   I[]I Cannabis  I[]I Cocaine  I[]I Heroin  I[]I Meth  I[]I Opioids  I[]I Stimulants  I[]I Benzos  I[]I Other:     I[]I N/A  I[]I U  Substance(s) of Choice: alcohol  I[]I N/A  I[]I U  Substance(s) Used Currently/Recently: alcohol  I[]I N/A  I[]I U  Alcohol Consumption:  daily  I[]I N/A  I[]I U  Last Drink: 2/8/23  I[]I N/A  I[]I U  Last Drug Use: "street drugs" 3 years ago  I[]I N/A  I[]I U  Duration of Sobriety/Abstinence: one month     I[]I Y  I[x]I N  I[]I U  Hx of Detox:   I[]I Y  I[x]I N  I[]I U  Hx of Rehab:   I[]I Y  I[]I N  I[x]I U  Hx of IVDU:   I[]I Y  I[]I N  I[x]I U  Hx of Accidental Overdose:   I[]I Y  I[]I N  I[x]I U  Hx of DUI:   I[]I Y  I[x]I N  I[]I U  Hx of Complicated Withdrawal (i.e. " "Seizures and/or Delirium Tremens):   I[x]I Y  I[]I N  I[]I U  Hx of Known/Suspected Substance-Induced Psychiatric Disorder:   I[x]I Y  I[]I N  I[]I U  Hx of Medication Assisted Treatment:   I[]I Y  I[x]I N  I[]I U  Hx of Twelve Step Program (or Equivalent) Involvement:   I[]I Y  I[x]I N  I[]I U  Currently Exhibits Signs of Intoxication:   I[x]I Y  I[]I N  I[]I U  Currently Exhibits Signs of Withdrawal:   I[]I Y  I[x]I N  I[]I U  Currently Active in Recovery:   I[x]I Y  I[]I N  I[]I U  Social Support:   I[]I Y  I[]I N  I[x]I U  Spouse/Partner Consumption:     I[]I N/A  I[x]I Y  I[]I N  I[]I U  Acknowledges/Accepts Addiction:   I[]I N/A  I[x]I Y  I[]I N  I[]I U  Advised to Quit/Cut Back:   I[]I N/A  I[x]I Y  I[]I N  I[]I U  Alcohol/Drug Cessation ("Wants to Quit"): Interested in Quitting  I[]I N/A  I[x]I Y  I[]I N  I[]I U  Motivation to Pursue Treatment: Moderate  I[]I N/A  I[x]I Y  I[]I N  I[]I U  Tobacco Cessation ("Wants to Quit"): interested in quitting    DSM-5-TR SUBSTANCE USE DISORDER CRITERIA:     -- Impaired Control:  I[x]I Y  I[]I N  I[]I U  I[]I A  I[]I D  Often take in larger amounts or over a longer period of time than was intended:   I[x]I Y  I[]I N  I[]I U  I[]I A  I[]I D  Persistent desire or unsuccessful efforts to cut down or control use:   I[x]I Y  I[]I N  I[]I U  I[]I A  I[]I D  Great deal of time spent in activities necessary to obtain substance, use, or recover from effects:   I[x]I Y  I[]I N  I[]I U  I[]I A  I[]I D  Craving/strong desire for substance or urge to use:   -- Social Impairment:  I[x]I Y  I[]I N  I[]I U  I[]I A  I[]I D  Use resulting in failure to fulfill major role obligations at home, work or school:   I[x]I Y  I[]I N  I[]I U  I[]I A  I[]I D  Social, occupational, recreational activities decreased because of use:   I[x]I Y  I[]I N  I[]I U  I[]I A  I[]I D  Continued use despite having persistent or recurrent social or interpersonal problems caused or exacerbated by the substance: "   -- Risky Use:  I[x]I Y  I[]I N  I[]I U  I[]I A  I[]I D  Recurrent use in situations in which it is physically hazardous:   I[x]I Y  I[]I N  I[]I U  I[]I A  I[]I D  Use despite physical or psychological problems that are likely to have been caused or exacerbated by the substance:   -- Neuroadaptation:  I[x]I Y  I[]I N  I[]I U  I[]I A  I[]I D  Tolerance, as defined by either of the following: (1) a need for markedly increased amounts of substance to achieve intoxication or desired effect.  -OR- (2) a markedly diminished effect with continued use of the same amount of substance:   I[x]I Y  I[]I N  I[]I U  I[]I A  I[]I D  Withdrawal, as manifested by either of the following: (1) the characteristic withdrawal syndrome for substance.  -OR- (2) substance is taken to relieve or avoid withdrawal symptoms:   -- Mild (1-3), Moderate (4-5), Severe (?6)    I[]I N/A  I[x]I Y  I[]I N  I[]I U  I[]I A  I[]I D  Active Substance Use Disorder:       HISTORY:     I[]I Patient denies any history, and none is known.  I[]I Patient unable or unwilling to provide any history.    I[x]I Y  I[]I N  I[]I U  Psychiatric Diagnoses: bipolar disorder, alcohol use disorder, SIMD  I[]I Y  I[x]I N  I[]I U  Current Psychiatric Provider (if Applicable): Seroquel filled by PCP  I[x]I Y  I[]I N  I[]I U  Hx of Psychiatric Hospitalization: 2011  I[x]I Y  I[]I N  I[]I U  Hx of Outpatient Psychiatric Treatment (psychiatry/psychotherapy):   I[x]I Y  I[]I N  I[]I U  Psychotropic Trials: Naltrexone, Seroquel Zoloft, Adderall, citalopram, Xanax  I[]I Y  I[x]I N  I[]I U  Prior Suicide Attempts:   I[]I Y  I[x]I N  I[]I U  Hx of Suicidal Ideation:   I[]I Y  I[x]I N  I[]I U  Hx of Homicidal Ideation:   I[]I Y  I[x]I N  I[]I U  Hx of Self-Injurious Behavior (Non-Suicidal):   I[]I Y  I[x]I N  I[]I U  Hx of Violence:   I[]I Y  I[x]I N  I[]I U  Documented Hx of Malingering:     FAMILY HISTORY:  I[x]I Y  I[]I N  I[]I U    Brother with mood disorder    I[]I Y  I[]I N   I[x]I U  Hx of Trauma/Neglect:   I[]I Y  I[]I N  I[x]I U  Hx of Physical Abuse:   I[]I Y  I[]I N  I[x]I U  Hx of Sexual Abuse:   I[]I Y  I[]I N  I[x]I U  Grew Up Locally?:   I[]I Y  I[]I N  I[x]I U  Happy Childhood?:   I[]I Y  I[x]I N  I[]I U  Significant Developmental Delay/Disability?:   I[x]I Y  I[]I N  I[]I U  GED/High School Dipoloma?:   I[x]I Y  I[]I N  I[]I U  Post High School Education?:   I[x]I Y  I[]I N  I[]I U  Currently Employed?:   I[]I Y  I[x]I N  I[]I U  On or Applying for Disability?:   I[x]I Y  I[]I N  I[]I U  Functions Independently?:   I[x]I Y  I[]I N  I[]I U  Financially Stable?:   I[x]I Y  I[]I N  I[]I U  Domiciled?:   I[]I Y  I[x]I N  I[]I U  Lives Alone?:   I[]I Y  I[]I N  I[x]I U  Heterosexual/Cisgender?:   I[]I Y  I[]I N  I[x]I U  Currently in a Romantic Relationship?:   I[]I Y  I[]I N  I[x]I U  Ever ?:   I[]I Y  I[]I N  I[x]I U  Children/Dependents?:   I[]I Y  I[]I N  I[x]I U  Cheondoism/Spiritual?:   I[]I Y  I[]I N  I[x]I U   History?:   I[]I Y  I[]I N  I[x]I U  Current Legal Issues:   I[]I Y  I[]I N  I[x]I U  Past Charges/Convictions:   I[]I Y  I[x]I N  I[]I U  Hx of Incarceration:   I[x]I Y  I[]I N  I[]I U  Engaged in Hobbies/Recreational Activities?:   I[x]I Y  I[]I N  I[]I U  Access to a Gun?: was at gun store day of presentation     I[]I Y  I[x]I N  I[]I U  Hx of Seizure:   I[]I Y  I[]I N  I[x]I U  Hx of Significant Head Trauma (e.g., Loss of Consciousness, Concussion, Coma):    I[x]I Y  I[]I N  I[]I U  Medical History & Diagnoses:       The patient's past medical history has been reviewed and updated as appropriate within the electronic medical record system.    Acute pancreatitis    Bipolar affective disorder in remission    Alcohol dependence with withdrawal    Elevated lipase    Hypertension    Morbid obesity     Scheduled and PRN Medications: The electronic chart was reviewed and updated as appropriate.  See Medcard for details.    Current Facility-Administered  Medications:     0.9%  NaCl infusion, , Intravenous, Continuous, Cande Best MD, Last Rate: 100 mL/hr at 02/09/23 0432, New Bag at 02/09/23 0432    atorvastatin tablet 80 mg, 80 mg, Oral, Daily, Cande Best MD, 80 mg at 02/09/23 0856    dextrose 10% bolus 125 mL 125 mL, 12.5 g, Intravenous, PRN, Ryan Rivera MD    dextrose 10% bolus 250 mL 250 mL, 25 g, Intravenous, PRN, Ryan Rivera MD    diazePAM tablet 5 mg, 5 mg, Oral, Q12H, Cande Best MD, 5 mg at 02/09/23 0856    enoxaparin injection 40 mg, 40 mg, Subcutaneous, Daily, Cande Best MD, 40 mg at 02/08/23 2329    folic acid tablet 1 mg, 1 mg, Oral, Daily, Cande Best MD, 1 mg at 02/09/23 0857    glucagon (human recombinant) injection 1 mg, 1 mg, Intramuscular, PRN, Cande Best MD    glucose chewable tablet 16 g, 16 g, Oral, PRN, Cande Best MD    glucose chewable tablet 24 g, 24 g, Oral, PRN, Cande Best MD    hydrALAZINE tablet 25 mg, 25 mg, Oral, Q8H PRN, Mabel Camarillo MD    HYDROmorphone injection 0.5 mg, 0.5 mg, Intravenous, Q6H PRN, Nicki Garcia DO    LORazepam tablet 2 mg, 2 mg, Oral, Q4H PRN, Mabel Camarillo MD    losartan tablet 50 mg, 50 mg, Oral, Daily, Cande Best MD, 50 mg at 02/09/23 0857    multivitamin tablet, 1 tablet, Oral, Daily, Cande Best MD, 1 tablet at 02/09/23 0857    naloxone 0.4 mg/mL injection 0.02 mg, 0.02 mg, Intravenous, PRN, Cande Best MD    ondansetron injection 4 mg, 4 mg, Intravenous, Q6H PRN, Nicki Garcia DO    oxyCODONE immediate release tablet 5 mg, 5 mg, Oral, Q6H PRN, Mabel Camarillo MD    pantoprazole EC tablet 40 mg, 40 mg, Oral, Daily, Cande Best MD, 40 mg at 02/09/23 0857    prochlorperazine injection Soln 5 mg, 5 mg, Intravenous, Q6H PRN, Cande Best MD    sodium chloride 0.9% flush 10 mL, 10 mL, Intravenous, Q12H PRN, Cande Best MD    thiamine tablet 100 mg, 100 mg, Oral, Daily, Cande Best MD, 100 mg at 02/09/23 0858    Current Outpatient Medications:     folic acid (FOLVITE) 1 MG  "tablet, Take 1,000 mcg by mouth once daily., Disp: , Rfl:     losartan (COZAAR) 100 MG tablet, Take 100 mg by mouth once daily., Disp: , Rfl:     QUEtiapine (SEROQUEL) 300 MG Tab, Take 600 mg by mouth every evening., Disp: , Rfl:     Allergies:  Ativan [lorazepam]    PSYCHOSOCIAL FACTORS:  Stressors (Biopsychosocial, Cultural and Environmental): family of origin, substance use/addiction  Functioning Relationships: strained with family     STRENGTHS AND LIABILITIES:   Strength: Patient is expressive/articulate.  Liability: Patient is in active addiction.        EXAMINATION:     BP (!) 186/114 (BP Location: Right arm, Patient Position: Lying)   Pulse 97   Temp 98.2 °F (36.8 °C) (Oral)   Resp 20   Ht 6' 3" (1.905 m)   Wt (!) 149.7 kg (330 lb)   SpO2 95%   BMI 41.25 kg/m²     MENTAL STATUS EXAMINATION:  General Appearance:  dressed in casual attire, appears uncomfortable   Behavior:  cooperative  Involuntary Movements and Motor Activity:  no abnormal involuntary movements noted  Gait and Station:  ambulates without assistance  Speech and Language:  conversational  Mood: "alright"  Affect: reactive  Thought Process and Associations:  linear and goal-directed, with no loosening of associations  Thought Content and Perceptions:  no suicidal or homicidal ideation, no evidence of psychosis  Sensorium:  alert and oriented, with clear sensorium  Recent and Remote Memory:  grossly intact  Attention and Concentration:  attentive, not readily distractible  Fund of Knowledge:  grossly intact, used appropriate vocabulary, no significant deficits noted  Insight:  intact, demonstrates awareness of illness and situation  Judgment: intact, behavior is adequate/appropriate given the circumstances      RISK MANAGEMENT:     The following risk parameters were assessed during this evaluation:    I[]I Y  I[x]I N  I[]I U  I[]I A  Suicidal Ideation/Behavior:   I[]I Y  I[x]I N  I[]I U  I[]I A  Homicidal Ideation/Behavior:   I[]I Y  I[x]I " N  I[]I U  I[]I A  Violence:  I[]I Y  I[x]I N  I[]I U  I[]I A  Self-Injurious Behavior:    The patient is deemed to be a reliable and factually accurate historian.    I[]I Y  I[x]I N  I[]I U  I[]I A  I[]I N/A  Minimization of Risk Parameters Suspected/Evident:   I[]I Y  I[x]I N  I[]I U  I[]I A  I[]I N/A  Exaggeration of Risk Parameters Suspected/Evident:       [] Y  [x] N  Danger to Self:   [] Y  [x] N  Danger to Others:   [] Y  [x] N  Grave Disability:     The patient does not currently meet the criteria for psychiatric admission and can be safely and effectively managed in a less restrictive level of care.    In cases of emergency, daily coverage provided by Acute/ER Psych MD, NP, PA, or SW, with contact numbers located in [facility] On Call Schedule.    Avery Webster, MS3    Agata Rios MD, PGY2  Department of Psychiatry  [facility]        KEY:     I[]I Y = Yes / Present / Endorses  I[]I N = No / Absent / Denies  I[]I U = Unknown / Unable to Assess / Unwilling to Participate  I[]I A = Ambiguity Exists / Accuracy Uncertain  I[]I D = Denial or Minimization is Suspected/Evident  I[]I N/A = Non-Applicable    CHART REVIEW:     Available documentation has been reviewed, and pertinent elements of the chart have been incorporated into this evaluation where appropriate.    The patient's last Epic encounter in the psychiatry department was on: Visit date not found  The patient's first Epic encounter in the psychiatry department was on:      LA/MS  [facility]  Site reviewed -  Last entry August 2022      ADVICE AND COUNSELING:     [x] In cases of emergencies (e.g. SI/HI resulting in danger to self or others, functioning deteriorates to the level of grave disability), call 911 or [phone], or present to the emergency department for immediate assistance.  [x] Patient should not operate a motor vehicle or heavy machinery if effects of medications or underlying symptoms/condition impair the ability to safely  do so.    Alcohol, Tobacco, and Drug Counseling, as well as resources, has been provided, as warranted.     Shared medical decision making and informed consent are the hallmark and bedrock of good clinical care, and as such have been employed and obtained, respectively, to the degree possible.      Risk Mitigation Strategies, Harm Reduction Techniques, and Safety Netting are important interventions that can reduce acute and chronic risk, and as such have been employed to the degree possible.    Prescription Drug Management entails the review, recommendation, or consideration without recommendation of medications, and as such was employed during the encounter.    Additional Psychoeducation has been provided, as warranted.    Discussed, to the extent possible, diagnosis, risks and benefits of proposed treatment vs alternative treatments vs no treatment, potential side effects of these treatments and the inherent unpredictability of treatment. The patient expresses understanding of the above and displays the capacity to agree with this treatment given said understanding. Patient also agrees that, currently, the benefits outweigh the risks and would like to pursue treatment at this time.     Written material has been provided to supplement, augment, and reinforce any discussions and interventions, via the AVS or other pre-printed handouts, as warranted.      DIAGNOSTIC TESTING:     The chart was reviewed for recent diagnostic procedures and investigations, and pertinent results are noted below.    Wt Readings from Last 2 Encounters:   02/08/23 (!) 149.7 kg (330 lb)   12/23/22 (!) 158.8 kg (350 lb)     BP Readings from Last 1 Encounters:   02/09/23 (!) 186/114     Pulse Readings from Last 1 Encounters:   02/09/23 97        Blood Counts, Electrolytes & Glucose: (i.e. WBC, ANC, Hemoglobin, Hematocrit, MCV, Platelets)  Lab Results   Component Value Date    WBC 6.57 02/09/2023    GRAN 4.5 02/09/2023    GRAN 68.6 02/09/2023     HGB 14.0 02/09/2023    HCT 42.0 02/09/2023    MCV 87 02/09/2023     02/09/2023     02/09/2023    K 4.3 02/09/2023    CALCIUM 8.1 (L) 02/09/2023    PHOS 4.9 (H) 02/09/2023    MG 1.8 02/09/2023    CO2 22 (L) 02/09/2023    ANIONGAP 12 02/09/2023     (H) 02/09/2023    HGBA1C 5.9 (H) 02/08/2023       Renal, Liver, Pancreas, Thyroid, Parathyroid, Prolactin, CPK, Lipids & Vitamin Levels: (i.e. Cr, BUN, Anion Gap, GFR, Urine Specific Gravity, Urine Protein, Microalburnin, AST, ALT, GGT, Alk Phos,Total Bili, Total Protein, Albumin, Ammonia, INR, Amylase, Lipase, TSH, Total T3, Total T4, Free T4 PTH, Prolactin, CPK, Cholesterol, Triglycerides, LDH, HDL, Vitamin B12, Folate, Vitamin D)  Lab Results   Component Value Date    CREATININE 0.9 02/09/2023    BUN 10 02/09/2023    EGFRNORACEVR >60.0 02/09/2023    SPECGRAV 1.015 02/08/2023    PROTEINUA Negative 02/08/2023    AST 88 (H) 02/09/2023    ALT 84 (H) 02/09/2023    ALKPHOS 125 02/09/2023    BILITOT 0.9 02/09/2023    LABPROT 10.9 06/28/2022    ALBUMIN 3.5 02/09/2023    INR 1.1 06/28/2022    LIPASE 158 (H) 02/08/2023    TSH 1.045 07/01/2022     (H) 07/01/2022    CHOL 88 (L) 02/09/2023    TRIG 649 (H) 02/09/2023    LDLCALC Invalid, Trig>400.0 02/09/2023    HDL 27 (L) 02/09/2023       Infection Diseases, Pregnancy Screenings & Drug Levels: (i.e. Hepatitis Panel, HIV, Syphilis, Urine & Blood Pregnancy Screens, beta hCG, Lithium, Valproic Acid, Carbamazepine, Lamotrigine, Phenytoin, Phenobarbital, Clozapine, Norclozapine, Clozapine + Norclozapine)   Lab Results   Component Value Date    HEPCAB Non-reactive 11/22/2022    [id number] Non-reactive 11/22/2022       Addiction: (i.e. Urine Toxicology, Blood Alcohol, PETH, EtG, EtS, CDT, Buprenorphine, Norbuprenorphine)  Lab Results   Component Value Date    PCDSOBENZOD Presumptive Positive (A) 11/21/2022    BARBITURATES Negative 11/21/2022    PCDSCOMETHA Negative 11/21/2022    OPIATESCREEN Negative 11/21/2022     COCAINEMETAB Negative 11/21/2022    AMPHETAMINES Negative 11/21/2022    MARIJUANATHC Negative 11/21/2022    PCDSOPHENCYN Negative 11/21/2022    ALCOHOLMEDIC <10 02/09/2023       Results for orders placed or performed during the hospital encounter of 02/08/23   EKG 12-lead    Collection Time: 02/08/23  5:48 PM    Narrative    Test Reason : R10.13,    Vent. Rate : 104 BPM     Atrial Rate : 104 BPM     P-R Int : 148 ms          QRS Dur : 088 ms      QT Int : 354 ms       P-R-T Axes : 059 014 040 degrees     QTc Int : 465 ms    Sinus tachycardia  Nonspecific T wave abnormality  Abnormal ECG  When compared with ECG of 23-DEC-2022 21:05,  Nonspecific T wave abnormality now evident in Lateral leads  Confirmed by DADA BAKER MD ([id number]) on 2/9/2023 8:01:53 AM    Referred By: [email or web address]   SELF           Confirmed By:DADA BAKER MD       No results found for this or any previous visit.    CONSULTATION:     A diagnostic psychiatric evaluation was performed and responsiveness to treatment was assessed.  The patient demonstrates adequate ability/capacity to respond to treatment.    Inpatient consult to Psychiatry  Consult performed by: Agata Rios MD  Consult ordered by: Cande Best MD        - The treatment team was informed of the encounter documentation.

## 2023-02-10 LAB
ALBUMIN SERPL BCP-MCNC: 3.2 G/DL (ref 3.5–5.2)
ALP SERPL-CCNC: 172 U/L (ref 55–135)
ALT SERPL W/O P-5'-P-CCNC: 117 U/L (ref 10–44)
ANION GAP SERPL CALC-SCNC: 11 MMOL/L (ref 8–16)
AST SERPL-CCNC: 254 U/L (ref 10–40)
BASOPHILS # BLD AUTO: 0.05 K/UL (ref 0–0.2)
BASOPHILS # BLD AUTO: 0.06 K/UL (ref 0–0.2)
BASOPHILS NFR BLD: 0.5 % (ref 0–1.9)
BASOPHILS NFR BLD: 0.7 % (ref 0–1.9)
BILIRUB SERPL-MCNC: 3.1 MG/DL (ref 0.1–1)
BUN SERPL-MCNC: 8 MG/DL (ref 6–20)
CALCIUM SERPL-MCNC: 8.4 MG/DL (ref 8.7–10.5)
CHLORIDE SERPL-SCNC: 105 MMOL/L (ref 95–110)
CO2 SERPL-SCNC: 20 MMOL/L (ref 23–29)
CREAT SERPL-MCNC: 0.9 MG/DL (ref 0.5–1.4)
DIFFERENTIAL METHOD: ABNORMAL
DIFFERENTIAL METHOD: ABNORMAL
EOSINOPHIL # BLD AUTO: 0 K/UL (ref 0–0.5)
EOSINOPHIL # BLD AUTO: 0.2 K/UL (ref 0–0.5)
EOSINOPHIL NFR BLD: 0.2 % (ref 0–8)
EOSINOPHIL NFR BLD: 2 % (ref 0–8)
ERYTHROCYTE [DISTWIDTH] IN BLOOD BY AUTOMATED COUNT: 14.4 % (ref 11.5–14.5)
ERYTHROCYTE [DISTWIDTH] IN BLOOD BY AUTOMATED COUNT: 14.5 % (ref 11.5–14.5)
EST. GFR  (NO RACE VARIABLE): >60 ML/MIN/1.73 M^2
GLUCOSE SERPL-MCNC: 104 MG/DL (ref 70–110)
HCT VFR BLD AUTO: 37.6 % (ref 40–54)
HCT VFR BLD AUTO: 38.4 % (ref 40–54)
HGB BLD-MCNC: 12.3 G/DL (ref 14–18)
HGB BLD-MCNC: 12.6 G/DL (ref 14–18)
IMM GRANULOCYTES # BLD AUTO: 0.05 K/UL (ref 0–0.04)
IMM GRANULOCYTES # BLD AUTO: 0.05 K/UL (ref 0–0.04)
IMM GRANULOCYTES NFR BLD AUTO: 0.5 % (ref 0–0.5)
IMM GRANULOCYTES NFR BLD AUTO: 0.5 % (ref 0–0.5)
LACTATE SERPL-SCNC: 1.8 MMOL/L (ref 0.5–2.2)
LYMPHOCYTES # BLD AUTO: 0.9 K/UL (ref 1–4.8)
LYMPHOCYTES # BLD AUTO: 1 K/UL (ref 1–4.8)
LYMPHOCYTES NFR BLD: 10.6 % (ref 18–48)
LYMPHOCYTES NFR BLD: 8.2 % (ref 18–48)
MAGNESIUM SERPL-MCNC: 1.6 MG/DL (ref 1.6–2.6)
MCH RBC QN AUTO: 28.8 PG (ref 27–31)
MCH RBC QN AUTO: 29.4 PG (ref 27–31)
MCHC RBC AUTO-ENTMCNC: 32 G/DL (ref 32–36)
MCHC RBC AUTO-ENTMCNC: 33.5 G/DL (ref 32–36)
MCV RBC AUTO: 88 FL (ref 82–98)
MCV RBC AUTO: 90 FL (ref 82–98)
MONOCYTES # BLD AUTO: 0.8 K/UL (ref 0.3–1)
MONOCYTES # BLD AUTO: 0.8 K/UL (ref 0.3–1)
MONOCYTES NFR BLD: 7.3 % (ref 4–15)
MONOCYTES NFR BLD: 9 % (ref 4–15)
NEUTROPHILS # BLD AUTO: 7.1 K/UL (ref 1.8–7.7)
NEUTROPHILS # BLD AUTO: 9.1 K/UL (ref 1.8–7.7)
NEUTROPHILS NFR BLD: 77.2 % (ref 38–73)
NEUTROPHILS NFR BLD: 83.3 % (ref 38–73)
NRBC BLD-RTO: 0 /100 WBC
NRBC BLD-RTO: 0 /100 WBC
PHOSPHATE SERPL-MCNC: 2.4 MG/DL (ref 2.7–4.5)
PLATELET # BLD AUTO: 219 K/UL (ref 150–450)
PLATELET # BLD AUTO: 224 K/UL (ref 150–450)
PMV BLD AUTO: 10.1 FL (ref 9.2–12.9)
PMV BLD AUTO: 10.9 FL (ref 9.2–12.9)
POTASSIUM SERPL-SCNC: 3.7 MMOL/L (ref 3.5–5.1)
PROT SERPL-MCNC: 6.6 G/DL (ref 6–8.4)
RBC # BLD AUTO: 4.27 M/UL (ref 4.6–6.2)
RBC # BLD AUTO: 4.29 M/UL (ref 4.6–6.2)
SODIUM SERPL-SCNC: 136 MMOL/L (ref 136–145)
WBC # BLD AUTO: 10.88 K/UL (ref 3.9–12.7)
WBC # BLD AUTO: 9.15 K/UL (ref 3.9–12.7)

## 2023-02-10 PROCEDURE — 99233 PR SUBSEQUENT HOSPITAL CARE,LEVL III: ICD-10-PCS | Mod: ,,, | Performed by: INTERNAL MEDICINE

## 2023-02-10 PROCEDURE — 25000003 PHARM REV CODE 250

## 2023-02-10 PROCEDURE — 85025 COMPLETE CBC W/AUTO DIFF WBC: CPT

## 2023-02-10 PROCEDURE — 63600175 PHARM REV CODE 636 W HCPCS

## 2023-02-10 PROCEDURE — 80053 COMPREHEN METABOLIC PANEL: CPT

## 2023-02-10 PROCEDURE — 83605 ASSAY OF LACTIC ACID: CPT

## 2023-02-10 PROCEDURE — 84100 ASSAY OF PHOSPHORUS: CPT

## 2023-02-10 PROCEDURE — 99232 SBSQ HOSP IP/OBS MODERATE 35: CPT | Mod: ,,, | Performed by: PSYCHIATRY & NEUROLOGY

## 2023-02-10 PROCEDURE — 85025 COMPLETE CBC W/AUTO DIFF WBC: CPT | Mod: 91

## 2023-02-10 PROCEDURE — 36415 COLL VENOUS BLD VENIPUNCTURE: CPT

## 2023-02-10 PROCEDURE — 83735 ASSAY OF MAGNESIUM: CPT

## 2023-02-10 PROCEDURE — 99233 SBSQ HOSP IP/OBS HIGH 50: CPT | Mod: ,,, | Performed by: INTERNAL MEDICINE

## 2023-02-10 PROCEDURE — 12000002 HC ACUTE/MED SURGE SEMI-PRIVATE ROOM

## 2023-02-10 PROCEDURE — 87040 BLOOD CULTURE FOR BACTERIA: CPT

## 2023-02-10 PROCEDURE — 99232 PR SUBSEQUENT HOSPITAL CARE,LEVL II: ICD-10-PCS | Mod: ,,, | Performed by: PSYCHIATRY & NEUROLOGY

## 2023-02-10 RX ORDER — ACETAMINOPHEN 500 MG
1000 TABLET ORAL 3 TIMES DAILY
Status: DISCONTINUED | OUTPATIENT
Start: 2023-02-10 | End: 2023-02-12

## 2023-02-10 RX ORDER — KETOROLAC TROMETHAMINE 10 MG/1
10 TABLET, FILM COATED ORAL EVERY 6 HOURS PRN
Status: DISCONTINUED | OUTPATIENT
Start: 2023-02-10 | End: 2023-02-11

## 2023-02-10 RX ORDER — ONDANSETRON 4 MG/1
4 TABLET, ORALLY DISINTEGRATING ORAL EVERY 6 HOURS PRN
Status: DISCONTINUED | OUTPATIENT
Start: 2023-02-10 | End: 2023-02-13 | Stop reason: HOSPADM

## 2023-02-10 RX ORDER — MAGNESIUM SULFATE HEPTAHYDRATE 40 MG/ML
2 INJECTION, SOLUTION INTRAVENOUS ONCE
Status: COMPLETED | OUTPATIENT
Start: 2023-02-10 | End: 2023-02-10

## 2023-02-10 RX ORDER — LORAZEPAM 0.5 MG/1
2 TABLET ORAL ONCE
Status: COMPLETED | OUTPATIENT
Start: 2023-02-10 | End: 2023-02-10

## 2023-02-10 RX ORDER — LORAZEPAM 2 MG/ML
2 INJECTION INTRAMUSCULAR EVERY 4 HOURS PRN
Status: DISCONTINUED | OUTPATIENT
Start: 2023-02-10 | End: 2023-02-10

## 2023-02-10 RX ORDER — NIFEDIPINE 30 MG/1
30 TABLET, EXTENDED RELEASE ORAL DAILY
Status: DISCONTINUED | OUTPATIENT
Start: 2023-02-10 | End: 2023-02-13 | Stop reason: HOSPADM

## 2023-02-10 RX ORDER — EZETIMIBE 10 MG/1
10 TABLET ORAL NIGHTLY
Status: DISCONTINUED | OUTPATIENT
Start: 2023-02-10 | End: 2023-02-10

## 2023-02-10 RX ORDER — POTASSIUM CHLORIDE 20 MEQ/1
40 TABLET, EXTENDED RELEASE ORAL ONCE
Status: COMPLETED | OUTPATIENT
Start: 2023-02-10 | End: 2023-02-10

## 2023-02-10 RX ORDER — SODIUM,POTASSIUM PHOSPHATES 280-250MG
2 POWDER IN PACKET (EA) ORAL ONCE
Status: COMPLETED | OUTPATIENT
Start: 2023-02-10 | End: 2023-02-10

## 2023-02-10 RX ORDER — DIAZEPAM 5 MG/1
10 TABLET ORAL EVERY 8 HOURS
Status: DISCONTINUED | OUTPATIENT
Start: 2023-02-10 | End: 2023-02-13 | Stop reason: HOSPADM

## 2023-02-10 RX ADMIN — QUETIAPINE FUMARATE 400 MG: 200 TABLET ORAL at 08:02

## 2023-02-10 RX ADMIN — LORAZEPAM 2 MG: 0.5 TABLET ORAL at 05:02

## 2023-02-10 RX ADMIN — HYDROMORPHONE HYDROCHLORIDE 0.5 MG: 1 INJECTION, SOLUTION INTRAMUSCULAR; INTRAVENOUS; SUBCUTANEOUS at 08:02

## 2023-02-10 RX ADMIN — FOLIC ACID 1 MG: 1 TABLET ORAL at 08:02

## 2023-02-10 RX ADMIN — OXYCODONE HYDROCHLORIDE 10 MG: 10 TABLET ORAL at 06:02

## 2023-02-10 RX ADMIN — ENOXAPARIN SODIUM 40 MG: 40 INJECTION SUBCUTANEOUS at 05:02

## 2023-02-10 RX ADMIN — NIFEDIPINE 30 MG: 30 TABLET, FILM COATED, EXTENDED RELEASE ORAL at 01:02

## 2023-02-10 RX ADMIN — POTASSIUM & SODIUM PHOSPHATES POWDER PACK 280-160-250 MG 2 PACKET: 280-160-250 PACK at 08:02

## 2023-02-10 RX ADMIN — ATORVASTATIN CALCIUM 80 MG: 40 TABLET, FILM COATED ORAL at 08:02

## 2023-02-10 RX ADMIN — THERA TABS 1 TABLET: TAB at 08:02

## 2023-02-10 RX ADMIN — DIAZEPAM 10 MG: 5 TABLET ORAL at 10:02

## 2023-02-10 RX ADMIN — OXYCODONE HYDROCHLORIDE 10 MG: 10 TABLET ORAL at 03:02

## 2023-02-10 RX ADMIN — ACETAMINOPHEN 1000 MG: 500 TABLET ORAL at 08:02

## 2023-02-10 RX ADMIN — OXYCODONE HYDROCHLORIDE 10 MG: 10 TABLET ORAL at 11:02

## 2023-02-10 RX ADMIN — POTASSIUM CHLORIDE 40 MEQ: 1500 TABLET, EXTENDED RELEASE ORAL at 08:02

## 2023-02-10 RX ADMIN — MAGNESIUM SULFATE 2 G: 2 INJECTION INTRAVENOUS at 11:02

## 2023-02-10 RX ADMIN — LOSARTAN POTASSIUM 50 MG: 50 TABLET, FILM COATED ORAL at 08:02

## 2023-02-10 RX ADMIN — HYDRALAZINE HYDROCHLORIDE 25 MG: 25 TABLET, FILM COATED ORAL at 12:02

## 2023-02-10 RX ADMIN — ACETAMINOPHEN 1000 MG: 500 TABLET ORAL at 02:02

## 2023-02-10 RX ADMIN — KETOROLAC TROMETHAMINE 10 MG: 10 TABLET, FILM COATED ORAL at 01:02

## 2023-02-10 RX ADMIN — LORAZEPAM 2 MG: 0.5 TABLET ORAL at 08:02

## 2023-02-10 RX ADMIN — LORAZEPAM 2 MG: 0.5 TABLET ORAL at 01:02

## 2023-02-10 RX ADMIN — THIAMINE HCL TAB 100 MG 100 MG: 100 TAB at 08:02

## 2023-02-10 RX ADMIN — DIAZEPAM 10 MG: 5 TABLET ORAL at 01:02

## 2023-02-10 RX ADMIN — HYDROMORPHONE HYDROCHLORIDE 0.5 MG: 1 INJECTION, SOLUTION INTRAMUSCULAR; INTRAVENOUS; SUBCUTANEOUS at 03:02

## 2023-02-10 RX ADMIN — PANTOPRAZOLE SODIUM 40 MG: 40 TABLET, DELAYED RELEASE ORAL at 08:02

## 2023-02-10 RX ADMIN — HYDRALAZINE HYDROCHLORIDE 25 MG: 25 TABLET, FILM COATED ORAL at 11:02

## 2023-02-10 RX ADMIN — KETOROLAC TROMETHAMINE 10 MG: 10 TABLET, FILM COATED ORAL at 08:02

## 2023-02-10 RX ADMIN — ONDANSETRON 4 MG: 2 INJECTION INTRAMUSCULAR; INTRAVENOUS at 03:02

## 2023-02-10 RX ADMIN — DIAZEPAM 10 MG: 5 TABLET ORAL at 08:02

## 2023-02-10 RX ADMIN — LORAZEPAM 2 MG: 0.5 TABLET ORAL at 03:02

## 2023-02-10 NOTE — ASSESSMENT & PLAN NOTE
Alcohol withdrawal syndrome    Hx of alcohol abuse, hospitalized in the past year for pancreatitis and alcohol withdrawal. Patient states he has an outpatient physician that gives him shots to help reduce his alcohol use. He was previously drinking 1.5 gallons of alcohol in a day, now transitioned from hard liquor to more wine and beer. Patient denies history of withdrawal seizures but has had a bad reaction to lorazepam in the past developing significant muscle spasms.    - CIWA protocol q4h; lorazepam 2 PRN CIWA > 8  - Scheduled diazepam 10 mg Q8h  - PRN nausea medications  - Multivitamin, thiamine 100 mg daily  - F/U consult to addiction psych

## 2023-02-10 NOTE — NURSING
Patient requesting for toradol to possibly be given more frequently; patient states that the toradol helps better than the dilaudid. Notified med team 4.

## 2023-02-10 NOTE — PLAN OF CARE
Problem: Adult Inpatient Plan of Care  Goal: Plan of Care Review  Outcome: Ongoing, Progressing     Problem: Bariatric Environmental Safety  Goal: Safety Maintained with Care  Outcome: Ongoing, Progressing     Problem: Pain (Pancreatitis)  Goal: Acceptable Pain Control  Outcome: Ongoing, Progressing     Problem: Fall Injury Risk  Goal: Absence of Fall and Fall-Related Injury  Outcome: Ongoing, Progressing

## 2023-02-10 NOTE — PROGRESS NOTES
02/10/23 0815   Vital Signs   Pulse (!) 114   Heart Rate Source Monitor   Resp 18   BP (!) 167/93   BP Location Right arm   BP Method Automatic   Patient Position Lying     MD notified

## 2023-02-10 NOTE — MEDICAL/APP STUDENT
FOLLOW UP VISIT: ADDICTION PSYCHIATRY CONSULTATION SERVICE      ASSESSMENT AND PLAN:     DIAGNOSES & PROBLEMS:  Alcohol Use Disorder, severe  Unspecified Mood Disorder    In Summary:  Patient is a 36 yo M with alcohol use disorder and bipolar disorder vs unspecified mood disorder presenting with pancreatitis 2/2 alcohol use. Psychiatry was consult for management of alcohol withdrawal.     Plan:  - Diazepam increased to 10 mg q8h   - Can continue patient's home Seroquel, however, would encourage re-investigation of the need for this high dose in the outpatient setting    - upon discharge, patient should expeditiously contact established outpatient psychiatric or primary care provider for further instructions; it is recommended to follow up with outpatient provider within 1-2 weeks of discharge, but ideally as soon as can be seen by the provider  - continue alcohol withdrawal protocol while patient is in house, including supportive measures,frequent monitoring of vitals and CIWA-Ar, and initiation of PRN +/- standing benzodiazepines to minimize risk of complicated withdrawal  - patient counseled on abstinence from alcohol and substances of abuse (illicit and prescription)  - addiction resource sheet provided to patient in discharge tab       INTERVAL HISTORY AND ROS:       Patient reports new symptoms of anxiety. Although describes them as intermittent. Patient is still concerned and discussing anxiety despite receiving dose of benzodiazapine. He is also fixated on pain control for his stomach pain, asking for ketorolac. This seems reasonable given the circumstance. Patient endorses visual disturbance, headache, sweating, upon one interaction. However the patient does not appear to be, or mention these in the other interview. CIWA score of 3, then CIWA score of 8.    CURRENT PSYCHOTROPIC REGIMEN:    diazePAM tablet 10 mg,  Q8H    hydrALAZINE tablet 25 mg, Oral, Q8H PRN    LORazepam tablet 2 mg, 2 mg, Oral, Q4H  "PRN    QUEtiapine (SEROQUEL) 300 MG Tab, in the morning    QUEtiapine (SEROQUEL) 400 MG Tab, in the evening    PERTINENT PAST HISTORY AND CHART REVIEW:     Prior to admission patient states he has been drinking 48 oz of beer and 1.5L wine daily in addition to hard liquor. Previous notes indicate that he was drinking around 1.5 gallons of alcohol and trying to cut down. His last drink was around 10am the morning of admission. He was last admitted for alcoholic pancreatitis and withdrawal in November 2022. He has been seen in the ED three times since then for concerns of pancreatitis and withdrawal.     The patient participated in alcohol support services via [facility] where he received an long acting injectable of Naltrexone. The patient reports that Naltrexone curbed alcohol cravings and allowed him to remain alcohol free for one month, however, the patient believes the drug stopped working one month after the initial injection which precipitated relapse in early January of this year (2023). The patient is interested in receiving Naltrexone but describes an issue receiving the injection every month. The patient does not express interest in rehab or detox programs or meetings and states that he just wants the Naltrexone shot more frequently with stated interest in only following up at [facility].     The patient is prescribed Seroquel 600mg for bipolar disorder and alludes to some periods of donta including lack of sleep due to elevated mood; however the patient states he does not believe he has bipolar. The patient states he began taking Seroquel for sleep seveal years as he was having difficulty with sleep due to anabolic steroids. Per chart review, the patient began taking Seroquel in 2017. The patient states he no longer uses anabolic steroids.       EXAMINATION:     BP (!) 167/93 (BP Location: Right arm, Patient Position: Lying)   Pulse (!) 114   Temp 99.7 °F (37.6 °C)   Resp 18   Ht 6' 3" (1.905 m)  " " Wt (!) 149.7 kg (330 lb)   SpO2 95%   BMI 41.25 kg/m²     MENTAL STATUS EXAMINATION:  General Appearance & Behavior: **is casually dressed, reasonably groomed, appears to be in distress due to stomach pain {Free Text:91663::"***"} {Drop Down:24155}  Involuntary Movements and Motor Activity: ** {Free Text:13200::"***"} no abnormal involuntary movements noted; no tics, no tremors, no akathisia, no dystonia, no evidence of tardive dyskinesia; no psychomotor agitation or retardation; normal gait and station; ambulates without assistance; no weakness or spasticity noted  Speech & Language: ** {Free Text:35522::"***"} conversational, spontaneous, speaks and understands English proficiently, intact; normal rate, rhythm, volume, tone, and pitch; conversational, spontaneous, and coherent; speaks and understands English proficiently and fluently; repeats words and phrases, no word finding difficulties are noted  Mood: "ok"  Affect: ** {Free Text:00175::"***"} normal, euthymic, reactive, full-range, mood-congruent, appropriate to situation and context  Thought Process & Associations: ** {Free Text:56799::"***"} intact; linear, goal-directed, organized, and logical; no loosening of associations noted  Thought Content & Perceptions: ** {Free Text:62250::"***"} no suicidal or homicidal ideation, no evidence of psychosis, focused on pain control, mentions visual disturbance.  Sensorium and Cognition: ** {Free Text:31889::"***"} grossly intact, no significant deficits noted  Insight & Judgment: ** {Free Text:06856::"***"} intact, demonstrates awareness of illness and adequate/appropriate behavior given the circumstances, questionable insight & judgement regarding addiction      RISK MANAGEMENT:     The following risk parameters were assessed during this evaluation:    I[]I Y  I[x]I N  I[]I U  I[]I A  Suicidal Ideation/Behavior: ** {Specifiers:19558}  I[]I Y  I[x]I N  I[]I U  I[]I A  Homicidal Ideation/Behavior: **  I[]I Y  " I[x]I N  I[]I U  I[]I A  Violence: **  I[]I Y  I[x]I N  I[]I U  I[]I A  Self-Injurious Behavior: **    The patient is deemed to be a reliable and factually accurate historian.    I[]I Y  I[x]I N  I[]I U  I[]I A  I[]I N/A  Minimization of Risk Parameters Suspected/Evident: **  I[]I Y  I[x]I N  I[]I U  I[]I A  I[]I N/A  Exaggeration of Risk Parameters Suspected/Evident: **      [] Y  [x] N  Danger to Self:   [] Y  [x] N  Danger to Others:   [] Y  [x] N  Grave Disability:     The patient does not currently meet the criteria for psychiatric admission and can be safely and effectively managed in a less restrictive level of care.    In cases of emergency, daily coverage provided by Acute/ER Psych MD, NP, PA, or SW, with contact numbers located in [facility] On Call Schedule.    Carlos Santos MS  Department of Psychiatry  [facility]        KEY:     I[]I Y = Yes / Present / Endorses  I[]I N = No / Absent / Denies  I[]I U = Unknown / Unable to Assess / Unwilling to Participate  I[]I A = Ambiguity Exists / Accuracy Uncertain  I[]I D = Denial or Minimization is Suspected/Evident  I[]I N/A = Non-Applicable    CHART REVIEW:     Available documentation has been reviewed, and pertinent elements of the chart - including previous psychiatric evaluations - have been incorporated into this evaluation where appropriate.    ADVICE AND COUNSELING:     [x] In cases of emergencies (e.g. SI/HI resulting in danger to self or others, functioning deteriorates to the level of grave disability), call 911 or [phone], or present to the emergency department for immediate assistance.  [x] Patient should not operate a motor vehicle or heavy machinery if effects of medications or underlying symptoms/condition impair the ability to safely do so.    Alcohol, Tobacco, and Drug Counseling, as well as resources, has been provided, as warranted.     Shared medical decision making and informed consent are the hallmark and bedrock of  good clinical care, and as such have been employed and obtained, respectively, to the degree possible.      Risk Mitigation Strategies, Harm Reduction Techniques, and Safety Netting are important interventions that can reduce acute and chronic risk, and as such have been employed to the degree possible.    Prescription Drug Management entails the review, recommendation, or consideration without recommendation of medications, and as such was employed during the encounter.    Additional Psychoeducation has been provided, as warranted.    Discussed, to the extent possible, diagnosis, risks and benefits of proposed treatment vs alternative treatments vs no treatment, potential side effects of these treatments and the inherent unpredictability of treatment. The patient's ability to understand, participate and engage in a conversation surrounding this was deemed to be: sufficient.    Written material on support groups, IOP, and other rehab possibilites has been provided to supplement, augment, and reinforce any discussions and interventions, via the AVS or other pre-printed handouts, as warranted.      DIAGNOSTIC TESTING:     The chart was reviewed for recent diagnostic procedures and investigations, and pertinent results are noted below.    Wt Readings from Last 2 Encounters:   02/08/23 (!) 149.7 kg (330 lb)   12/23/22 (!) 158.8 kg (350 lb)     BP Readings from Last 1 Encounters:   02/10/23 (!) 167/93     Pulse Readings from Last 1 Encounters:   02/10/23 (!) 114        Blood Counts, Electrolytes & Glucose: (i.e. WBC, ANC, Hemoglobin, Hematocrit, MCV, Platelets)  Lab Results   Component Value Date    WBC 9.15 02/10/2023    GRAN 7.1 02/10/2023    GRAN 77.2 (H) 02/10/2023    HGB 12.3 (L) 02/10/2023    HCT 38.4 (L) 02/10/2023    MCV 90 02/10/2023     02/10/2023     02/10/2023    K 3.7 02/10/2023    CALCIUM 8.4 (L) 02/10/2023    PHOS 2.4 (L) 02/10/2023    MG 1.6 02/10/2023    CO2 20 (L) 02/10/2023    ANIONGAP 11  02/10/2023     02/10/2023    HGBA1C 5.9 (H) 02/08/2023       Renal, Liver, Pancreas, Thyroid, Parathyroid, Prolactin, CPK, Lipids & Vitamin Levels: (i.e. Cr, BUN, Anion Gap, GFR, Urine Specific Gravity, Urine Protein, Microalburnin, AST, ALT, GGT, Alk Phos,Total Bili, Total Protein, Albumin, Ammonia, INR, Amylase, Lipase, TSH, Total T3, Total T4, Free T4 PTH, Prolactin, CPK, Cholesterol, Triglycerides, LDH, HDL, Vitamin B12, Folate, Vitamin D)  Lab Results   Component Value Date    CREATININE 0.9 02/10/2023    BUN 8 02/10/2023    EGFRNORACEVR >60.0 02/10/2023    SPECGRAV 1.015 02/08/2023    PROTEINUA Negative 02/08/2023     (H) 02/10/2023     (H) 02/10/2023    ALKPHOS 172 (H) 02/10/2023    BILITOT 3.1 (H) 02/10/2023    LABPROT 10.9 06/28/2022    ALBUMIN 3.2 (L) 02/10/2023    INR 1.1 06/28/2022    LIPASE 158 (H) 02/08/2023    TSH 1.045 07/01/2022     (H) 07/01/2022    CHOL 88 (L) 02/09/2023    TRIG 649 (H) 02/09/2023    LDLCALC Invalid, Trig>400.0 02/09/2023    HDL 27 (L) 02/09/2023       Infection Diseases, Pregnancy Screenings & Drug Levels: (i.e. Hepatitis Panel, HIV, Syphilis, Urine & Blood Pregnancy Screens, beta hCG, Lithium, Valproic Acid, Carbamazepine, Lamotrigine, Phenytoin, Phenobarbital, Clozapine, Norclozapine, Clozapine + Norclozapine)   Lab Results   Component Value Date    HEPCAB Non-reactive 11/22/2022    [id number] Non-reactive 11/22/2022       Addiction: (i.e. Urine Toxicology, Blood Alcohol, PETH, EtG, EtS, CDT, Buprenorphine, Norbuprenorphine)  Lab Results   Component Value Date    PCDSOBENZOD Presumptive Positive (A) 11/21/2022    BARBITURATES Negative 11/21/2022    PCDSCOMETHA Negative 11/21/2022    OPIATESCREEN Negative 11/21/2022    COCAINEMETAB Negative 11/21/2022    AMPHETAMINES Negative 11/21/2022    MARIJUANATHC Negative 11/21/2022    PCDSOPHENCYN Negative 11/21/2022    ALCOHOLMEDIC <10 02/09/2023       Results for orders placed or performed during the hospital  encounter of 02/08/23   EKG 12-lead    Collection Time: 02/08/23  5:48 PM    Narrative    Test Reason : R10.13,    Vent. Rate : 104 BPM     Atrial Rate : 104 BPM     P-R Int : 148 ms          QRS Dur : 088 ms      QT Int : 354 ms       P-R-T Axes : 059 014 040 degrees     QTc Int : 465 ms    Sinus tachycardia  Nonspecific T wave abnormality  Abnormal ECG  When compared with ECG of 23-DEC-2022 21:05,  Nonspecific T wave abnormality now evident in Lateral leads  Confirmed by DADA BAKER MD ([id number]) on 2/9/2023 8:01:53 AM    Referred By: THOR   SELF           Confirmed By:DADA BAKER MD       No results found for this or any previous visit.

## 2023-02-10 NOTE — HOSPITAL COURSE
Admitted for acute alcoholic vs hyperlipidemic pancreatitis with concerns for concurrent alcohol withdrawal. Started on diazepam taper with PRN lorazepam. CLD ADAT with pain regimen and IVF. Patient continues to lose IV access due to agitation and confusion. Concern for infection due to fever of 100, infectious work up negative. Pt tolerating clear liquid without emesis, advancing diet. Pain regimen and CIWA protocol in place. Pain regimen deescalated to orals. Liver enzymes increasing, Liver doppler ordered. Pt able to tolerate diet without emesis. Multiple episodes of trying to leave the facility to get food. On 2/12 sitter imformed that the pt left the unit against charge nurse advice. Found the pt back in room with a bag of vodka, apple juice, and vape pens. Items were confiscated and held in charge nurse office. Established verbal behavioral contract with pt. 2/13 morning pt smelled of alchol, he snuck into charge nurse office to retreive his things and was adamant about leaving. IV removed, pt left against medical advice.

## 2023-02-10 NOTE — ASSESSMENT & PLAN NOTE
Hx of hypertension, patient states he has not been taking antihypertensive medications. Blood pressure elevated on admission.     - Restart losartan 50 mg daily  - Start nifedipine 60 mg daily  - PRN hydralazine PO 25 mg for SBP > 180  - Uptitrate regimen as required

## 2023-02-10 NOTE — NURSING
Called into room by another nurse stating that patient pulled IV out. Upon assessment, 22g IV to the left forearm that was placed overnight by the charge nurse was out on the floor. Patient states he did not do it intentionally. Notified charge nurse. Continuous fluids stopped.

## 2023-02-10 NOTE — PROGRESS NOTES
02/10/23 1300   Vital Signs   Pulse (!) 113   Heart Rate Source Monitor   Resp 18   SpO2 96 %   Device (Oxygen Therapy) room air   BP (!) 192/102   MAP (mmHg) 139   BP Location Right arm   BP Method Automatic   Patient Position Sitting     Md notified

## 2023-02-10 NOTE — ED NOTES
Report given to SHAUNA Hutchinson on 16WT. Requesting ativan allergy to be removed, since pt tolerates medication.

## 2023-02-10 NOTE — PROGRESS NOTES
[facility] - [facility] ([address] Medicine  Progress Note    Patient Name: Lola Wetzel  MRN: [id number]  Patient Class: IP- Inpatient   Admission Date: 2/8/2023  Length of Stay: 1 days  Attending Physician: Beatris Chandler MD  Primary Care Provider: Janet Alaniz DO        Subjective:     Principal Problem:Acute pancreatitis        HPI:  Mr. Wetzel is a 37 year old man with a history of alcoholic pancreatitis, alcohol use, bipolar disorder, HTN who presents for admission with complaints of abdominal pain, nausea for the past two days. Patient states that he has been drinking heavily over the past few weeks, increased in the past week. He started having epigastric pain, nausea and vomiting for he past two days that has not abated, prompting him to seek care. He has had similar symptoms in the past. He states he has been dealing with a headache, but denies any tremors, vision changes, hallucinations. He feels he is starting to have some symptoms of withdrawal. He denies a history of seizures with withdrawals and states that he does not react well to lorezapam, developing severe muscle and back spasms the last time he received it. He sees an outpatient addiction psych medicine physician and states that he receives shots to help with his drinking. He has been drinking 48 oz of beer and 1.5L wine daily in addition to hard liquor. Previous notes indicate that he was drinking around 1.5 gallons of alcohol and trying to cut down. His last drink was around 10am the morning of admission.  He denies any tobacco use, drug use. He states he feels that his drinking is worse when he has too much time on his hands, he currently works managing short term rental properties. He was last admitted for alcoholic pancreatitis and withdrawal in November 2022. He has been seen in the ED three times since then for concerns of pancreatitis and withdrawal.  He denies any fever, chills, dysuria, diarrhea, body  aches.    In the ED, vital signs stable. Labs significant for elevated LFTs with , ALT 84, lipase 158. CT imaging showed hepatic steatosis, pancreatitis.       Overview/Hospital Course:  Admitted for acute alcoholic vs hyperlipidemic pancreatitis with concerns for concurrent alcohol withdrawal. Started on diazepam taper with PRN lorazepam. CLD ADAT with pain regimen and IVF. Patient continues to lose IV access due to agitation and confusion, IVF interrupted.      Interval History: AF HDS NAEON. Hypertensive. Continues to be agitated and pulls out PIV multiple times overnight. Able to tolerate some liquids PO.    Review of Systems   Constitutional:  Negative for chills and fever.   HENT:  Negative for congestion and sore throat.    Eyes:  Negative for pain and visual disturbance.   Respiratory:  Negative for cough and shortness of breath.    Cardiovascular:  Negative for chest pain and leg swelling.   Gastrointestinal:  Positive for abdominal pain and nausea. Negative for diarrhea and vomiting.   Genitourinary:  Negative for dysuria and hematuria.   Musculoskeletal:  Negative for arthralgias and back pain.   Skin:  Negative for color change and pallor.   Neurological:  Positive for headaches. Negative for light-headedness.   Psychiatric/Behavioral:  Positive for agitation and confusion.    Objective:     Vital Signs (Most Recent):  Temp: 98.8 °F (37.1 °C) (02/10/23 1124)  Pulse: (!) 113 (02/10/23 1300)  Resp: 18 (02/10/23 1300)  BP: (!) 192/102 (02/10/23 1300)  SpO2: 96 % (02/10/23 1300)   Vital Signs (24h Range):  Temp:  [98 °F (36.7 °C)-99.7 °F (37.6 °C)] 98.8 °F (37.1 °C)  Pulse:  [] 113  Resp:  [15-18] 18  SpO2:  [92 %-98 %] 96 %  BP: (136-218)/() 192/102     Weight: (!) 149.7 kg (330 lb)  Body mass index is 41.25 kg/m².    Intake/Output Summary (Last 24 hours) at 2/10/2023 1356  Last data filed at 2/9/2023 1932  Gross per 24 hour   Intake 525.01 ml   Output --   Net 525.01 ml      Physical  Exam  Vitals reviewed.   Constitutional:       General: He is not in acute distress.     Appearance: He is ill-appearing.   HENT:      Head: Normocephalic and atraumatic.      Mouth/Throat:      Mouth: Mucous membranes are moist.      Pharynx: Oropharynx is clear.   Eyes:      Extraocular Movements: Extraocular movements intact.      Pupils: Pupils are equal, round, and reactive to light.   Cardiovascular:      Rate and Rhythm: Regular rhythm. Tachycardia present.   Pulmonary:      Effort: Pulmonary effort is normal.      Breath sounds: Normal breath sounds.   Abdominal:      General: Bowel sounds are normal.      Palpations: Abdomen is soft.      Tenderness: There is abdominal tenderness.   Musculoskeletal:         General: Normal range of motion.      Right lower leg: No edema.      Left lower leg: No edema.   Skin:     General: Skin is warm and dry.      Capillary Refill: Capillary refill takes less than 2 seconds.   Neurological:      Mental Status: He is alert and oriented to person, place, and time. Mental status is at baseline.       Significant Labs: All pertinent labs within the past 24 hours have been reviewed.  CBC:   Recent Labs   Lab 02/08/23  1633 02/09/23  0333 02/10/23  0508   WBC 4.95 6.57 9.15   HGB 14.6 14.0 12.3*   HCT 42.0 42.0 38.4*    247 224     CMP:   Recent Labs   Lab 02/08/23  1633 02/09/23  0333 02/10/23  0508    139 136   K 3.7 4.3 3.7    105 105   CO2 24 22* 20*   * 118* 104   BUN 9 10 8   CREATININE 0.9 0.9 0.9   CALCIUM 8.6* 8.1* 8.4*   PROT 7.2 6.9 6.6   ALBUMIN 3.7 3.5 3.2*   BILITOT 0.8 0.9 3.1*   ALKPHOS 129 125 172*   * 88* 254*   ALT 84* 84* 117*   ANIONGAP 11 12 11       Significant Imaging: I have reviewed all pertinent imaging results/findings within the past 24 hours.      Assessment/Plan:      * Acute pancreatitis  37 year old man with a history of alcohol induced pancreatitis, alcohol dependence, bipolar disorder, HTN, who presented for  admission with complaints of epigastric pain, nausea and vomiting that started two days prior to admission. Labs are significant for elevated lipase, transaminases and history of heavy alcohol use in the past few weeks.     He received 2L IV fluid bolus in the ED. CT imaging confirms acute pancreatitis, hepatomegaly, hepatic steatosis    Plan:  - Continue aggressive IV fluids  - Aggressive pain regimen: scheduled Tylenol, PRN oxy 10 q6h, PRN ketorolac 10 q6h, breakthrough IV dilaudid 0.5 q6h  - CLD as tolerated  - CT imaging does not show evidence of necrosis, cyst requiring antbiotics at this time    Hypertension  Hx of hypertension, patient states he has not been taking antihypertensive medications. Blood pressure elevated on admission.     - Restart losartan 50 mg daily  - Start nifedipine 60 mg daily  - PRN hydralazine PO 25 mg for SBP > 180  - Uptitrate regimen as required    Elevated lipase  See acute pancreatitis      Alcohol use disorder, severe, dependence  Alcohol withdrawal syndrome    Hx of alcohol abuse, hospitalized in the past year for pancreatitis and alcohol withdrawal. Patient states he has an outpatient physician that gives him shots to help reduce his alcohol use. He was previously drinking 1.5 gallons of alcohol in a day, now transitioned from hard liquor to more wine and beer. Patient denies history of withdrawal seizures but has had a bad reaction to lorazepam in the past developing significant muscle spasms.    - CIWA protocol q4h; lorazepam 2 PRN CIWA > 8  - Scheduled diazepam 10 mg Q8h  - PRN nausea medications  - Multivitamin, thiamine 100 mg daily  - F/U consult to addiction psych    Bipolar affective disorder in remission  Continue home seroquel        VTE Risk Mitigation (From admission, onward)         Ordered     enoxaparin injection 40 mg  Daily         02/08/23 2214     IP VTE HIGH RISK PATIENT  Once         02/08/23 2214     Place sequential compression device  Until discontinued          02/08/23 2214                Discharge Planning   MATY: 2/13/2023     Code Status: Full Code   Is the patient medically ready for discharge?:     Reason for patient still in hospital (select all that apply): Patient trending condition and Treatment                     Mabel Camarillo MD  Department of Hospital Medicine   [facility] - Intensive Care ([place])

## 2023-02-10 NOTE — PLAN OF CARE
Problem: Adult Inpatient Plan of Care  Goal: Plan of Care Review  Outcome: Ongoing, Progressing  Goal: Optimal Comfort and Wellbeing  Outcome: Ongoing, Progressing  Goal: Readiness for Transition of Care  Outcome: Ongoing, Progressing     Problem: Pain (Pancreatitis)  Goal: Acceptable Pain Control  Outcome: Ongoing, Progressing

## 2023-02-10 NOTE — SUBJECTIVE & OBJECTIVE
Interval History: AF HDS NAEON. Hypertensive. Continues to be agitated and pulls out PIV multiple times overnight. Able to tolerate some liquids PO.    Review of Systems   Constitutional:  Negative for chills and fever.   HENT:  Negative for congestion and sore throat.    Eyes:  Negative for pain and visual disturbance.   Respiratory:  Negative for cough and shortness of breath.    Cardiovascular:  Negative for chest pain and leg swelling.   Gastrointestinal:  Positive for abdominal pain and nausea. Negative for diarrhea and vomiting.   Genitourinary:  Negative for dysuria and hematuria.   Musculoskeletal:  Negative for arthralgias and back pain.   Skin:  Negative for color change and pallor.   Neurological:  Positive for headaches. Negative for light-headedness.   Psychiatric/Behavioral:  Positive for agitation and confusion.    Objective:     Vital Signs (Most Recent):  Temp: 98.8 °F (37.1 °C) (02/10/23 1124)  Pulse: (!) 113 (02/10/23 1300)  Resp: 18 (02/10/23 1300)  BP: (!) 192/102 (02/10/23 1300)  SpO2: 96 % (02/10/23 1300)   Vital Signs (24h Range):  Temp:  [98 °F (36.7 °C)-99.7 °F (37.6 °C)] 98.8 °F (37.1 °C)  Pulse:  [] 113  Resp:  [15-18] 18  SpO2:  [92 %-98 %] 96 %  BP: (136-218)/() 192/102     Weight: (!) 149.7 kg (330 lb)  Body mass index is 41.25 kg/m².    Intake/Output Summary (Last 24 hours) at 2/10/2023 1356  Last data filed at 2/9/2023 1932  Gross per 24 hour   Intake 525.01 ml   Output --   Net 525.01 ml      Physical Exam  Vitals reviewed.   Constitutional:       General: He is not in acute distress.     Appearance: He is ill-appearing.   HENT:      Head: Normocephalic and atraumatic.      Mouth/Throat:      Mouth: Mucous membranes are moist.      Pharynx: Oropharynx is clear.   Eyes:      Extraocular Movements: Extraocular movements intact.      Pupils: Pupils are equal, round, and reactive to light.   Cardiovascular:      Rate and Rhythm: Regular rhythm. Tachycardia present.    Pulmonary:      Effort: Pulmonary effort is normal.      Breath sounds: Normal breath sounds.   Abdominal:      General: Bowel sounds are normal.      Palpations: Abdomen is soft.      Tenderness: There is abdominal tenderness.   Musculoskeletal:         General: Normal range of motion.      Right lower leg: No edema.      Left lower leg: No edema.   Skin:     General: Skin is warm and dry.      Capillary Refill: Capillary refill takes less than 2 seconds.   Neurological:      Mental Status: He is alert and oriented to person, place, and time. Mental status is at baseline.       Significant Labs: All pertinent labs within the past 24 hours have been reviewed.  CBC:   Recent Labs   Lab 02/08/23  1633 02/09/23  0333 02/10/23  0508   WBC 4.95 6.57 9.15   HGB 14.6 14.0 12.3*   HCT 42.0 42.0 38.4*    247 224     CMP:   Recent Labs   Lab 02/08/23  1633 02/09/23  0333 02/10/23  0508    139 136   K 3.7 4.3 3.7    105 105   CO2 24 22* 20*   * 118* 104   BUN 9 10 8   CREATININE 0.9 0.9 0.9   CALCIUM 8.6* 8.1* 8.4*   PROT 7.2 6.9 6.6   ALBUMIN 3.7 3.5 3.2*   BILITOT 0.8 0.9 3.1*   ALKPHOS 129 125 172*   * 88* 254*   ALT 84* 84* 117*   ANIONGAP 11 12 11       Significant Imaging: I have reviewed all pertinent imaging results/findings within the past 24 hours.

## 2023-02-10 NOTE — PROGRESS NOTES
FOLLOW UP VISIT: ADDICTION PSYCHIATRY CONSULTATION SERVICE      ASSESSMENT AND PLAN:     DIAGNOSES & PROBLEMS:  Alcohol Use Disorder, severe  Unspecified Mood Disorder    In Summary:  Patient is a 36 yo M with alcohol use disorder and bipolar disorder vs unspecified mood disorder presenting with pancreatitis 2/2 alcohol use. Psychiatry was consult for management of alcohol withdrawal.     Plan:  - Continue diazepam 10 mg q8h. Could potentially start tapering down dose tomorrow based on clinical response and decreased need for PRN benzodiazepines.  - Can continue patient's home Seroquel, however, would encourage re-investigation of the need for this high dose in the outpatient setting    - discussed with patient that upon discharge, patient should expeditiously contact established outpatient psychiatric or primary care provider for further instructions; it is recommended to follow up with outpatient provider within 1-2 weeks of discharge, but ideally as soon as can be seen by the provider  - continue alcohol withdrawal protocol while patient is in house, including supportive measures,frequent monitoring of vitals and CIWA-Ar, and initiation of PRN +/- standing benzodiazepines to minimize risk of complicated withdrawal  - patient counseled on abstinence from alcohol and substances of abuse (illicit and prescription)  - addiction resource sheet provided to patient in discharge tab       INTERVAL HISTORY AND ROS:       Patient reports new symptoms of anxiety, although describes them as intermittent. Patient attributes his anxiety to having to catch up on things at home. He continues to endorse abdominal pain, requesting Toradol. Patient endorses visual disturbance, some headache, sweating, upon one interaction. However, he appears much more comfortable and with CIWA score of 3 during time of initial interview.   Patient discussed his plan to follow-up outpatient with [facility]. Denied needing assistance with  "setting up an appointment and plans to be seen as a walk-in soon after discharge. Patient shared the insight that "boredom" is a trigger for his drinking, and he is looking to make lifestyle changes to prevent that.     CURRENT PSYCHOTROPIC REGIMEN:  Diazepam 10 mg q8h   Seroquel 400 mg nightly     PERTINENT PAST HISTORY AND CHART REVIEW:     Prior to admission patient states he has been drinking 48 oz of beer and 1.5L wine daily in addition to hard liquor. Previous notes indicate that he was drinking around 1.5 gallons of alcohol and trying to cut down. His last drink was around 10am the morning of admission. He was last admitted for alcoholic pancreatitis and withdrawal in November 2022. He has been seen in the ED three times since then for concerns of pancreatitis and withdrawal.     The patient participated in alcohol support services via [facility] where he received an long acting injectable of Naltrexone. The patient reports that Naltrexone curbed alcohol cravings and allowed him to remain alcohol free for one month, however, the patient believes the drug stopped working one month after the initial injection which precipitated relapse in early January of this year (2023). The patient is interested in receiving Naltrexone but describes an issue receiving the injection every month. The patient does not express interest in rehab or detox programs or meetings and states that he just wants the Naltrexone shot more frequently with stated interest in only following up at [facility].     The patient is prescribed Seroquel 600mg for bipolar disorder and alludes to some periods of donta including lack of sleep due to elevated mood; however the patient states he does not believe he has bipolar. The patient states he began taking Seroquel for sleep seveal years as he was having difficulty with sleep due to anabolic steroids. Per chart review, the patient began taking Seroquel in 2017. The patient states he no longer " "uses anabolic steroids.       EXAMINATION:     BP (!) 186/110   Pulse (!) 112   Temp 98.8 °F (37.1 °C)   Resp 18   Ht 6' 3" (1.905 m)   Wt (!) 149.7 kg (330 lb)   SpO2 96%   BMI 41.25 kg/m²     MENTAL STATUS EXAMINATION:  General Appearance & Behavior: is casually dressed, reasonably groomed, appears to be in mild distress due to stomach pain   Involuntary Movements and Motor Activity:  no abnormal involuntary movements noted; no tics, no tremors, no akathisia, no dystonia, no evidence of tardive dyskinesia; no psychomotor agitation or retardation; normal gait and station; ambulates without assistance; no weakness or spasticity noted  Speech & Language:  conversational, spontaneous, speaks and understands English proficiently, intact; normal rate, rhythm, volume, tone, and pitch; conversational, spontaneous, and coherent; speaks and understands English proficiently and fluently; repeats words and phrases, no word finding difficulties are noted  Mood: "ok"  Affect:  normal, euthymic, reactive, full-range, mood-congruent, appropriate to situation and context  Thought Process & Associations:  intact; linear, goal-directed, organized, and logical; no loosening of associations noted  Thought Content & Perceptions:  no suicidal or homicidal ideation, no evidence of psychosis, focused on pain control, mentions questionable visual disturbance of things moving on the river that shouldn't be  Sensorium and Cognition:  grossly intact, no significant deficits noted  Insight & Judgment:  intact, demonstrates awareness of illness and adequate/appropriate behavior given the circumstances      RISK MANAGEMENT:     The following risk parameters were assessed during this evaluation:    I[]I Y  I[x]I N  I[]I U  I[]I A  Suicidal Ideation/Behavior:   I[]I Y  I[x]I N  I[]I U  I[]I A  Homicidal Ideation/Behavior:   I[]I Y  I[x]I N  I[]I U  I[]I A  Violence:   I[]I Y  I[x]I N  I[]I U  I[]I A  Self-Injurious Behavior:     The patient " is deemed to be a reliable and factually accurate historian.    I[]I Y  I[x]I N  I[]I U  I[]I A  I[]I N/A  Minimization of Risk Parameters Suspected/Evident:   I[]I Y  I[x]I N  I[]I U  I[]I A  I[]I N/A  Exaggeration of Risk Parameters Suspected/Evident:       [] Y  [x] N  Danger to Self:   [] Y  [x] N  Danger to Others:   [] Y  [x] N  Grave Disability:     The patient does not currently meet the criteria for psychiatric admission and can be safely and effectively managed in a less restrictive level of care.    In cases of emergency, daily coverage provided by Acute/ER Psych MD, NP, PA, or SW, with contact numbers located in [facility] On Call Schedule.      Carlos Santos, MS3    Agata Rios MD, PGY2  Department of Psychiatry  [facility]        KEY:     I[]I Y = Yes / Present / Endorses  I[]I N = No / Absent / Denies  I[]I U = Unknown / Unable to Assess / Unwilling to Participate  I[]I A = Ambiguity Exists / Accuracy Uncertain  I[]I D = Denial or Minimization is Suspected/Evident  I[]I N/A = Non-Applicable    CHART REVIEW:     Available documentation has been reviewed, and pertinent elements of the chart - including previous psychiatric evaluations - have been incorporated into this evaluation where appropriate.    ADVICE AND COUNSELING:     [x] In cases of emergencies (e.g. SI/HI resulting in danger to self or others, functioning deteriorates to the level of grave disability), call 911 or [phone], or present to the emergency department for immediate assistance.  [x] Patient should not operate a motor vehicle or heavy machinery if effects of medications or underlying symptoms/condition impair the ability to safely do so.    Alcohol, Tobacco, and Drug Counseling, as well as resources, has been provided, as warranted.     Shared medical decision making and informed consent are the hallmark and bedrock of good clinical care, and as such have been employed and obtained, respectively, to the degree  possible.      Risk Mitigation Strategies, Harm Reduction Techniques, and Safety Netting are important interventions that can reduce acute and chronic risk, and as such have been employed to the degree possible.    Prescription Drug Management entails the review, recommendation, or consideration without recommendation of medications, and as such was employed during the encounter.    Additional Psychoeducation has been provided, as warranted.    Discussed, to the extent possible, diagnosis, risks and benefits of proposed treatment vs alternative treatments vs no treatment, potential side effects of these treatments and the inherent unpredictability of treatment. The patient's ability to understand, participate and engage in a conversation surrounding this was deemed to be: sufficient.    Written material on support groups, IOP, and other rehab possibilites has been provided to supplement, augment, and reinforce any discussions and interventions, via the AVS or other pre-printed handouts, as warranted.      DIAGNOSTIC TESTING:     The chart was reviewed for recent diagnostic procedures and investigations, and pertinent results are noted below.    Wt Readings from Last 2 Encounters:   02/08/23 (!) 149.7 kg (330 lb)   12/23/22 (!) 158.8 kg (350 lb)     BP Readings from Last 1 Encounters:   02/10/23 (!) 186/110     Pulse Readings from Last 1 Encounters:   02/10/23 (!) 112        Blood Counts, Electrolytes & Glucose: (i.e. WBC, ANC, Hemoglobin, Hematocrit, MCV, Platelets)  Lab Results   Component Value Date    WBC 9.15 02/10/2023    GRAN 7.1 02/10/2023    GRAN 77.2 (H) 02/10/2023    HGB 12.3 (L) 02/10/2023    HCT 38.4 (L) 02/10/2023    MCV 90 02/10/2023     02/10/2023     02/10/2023    K 3.7 02/10/2023    CALCIUM 8.4 (L) 02/10/2023    PHOS 2.4 (L) 02/10/2023    MG 1.6 02/10/2023    CO2 20 (L) 02/10/2023    ANIONGAP 11 02/10/2023     02/10/2023    HGBA1C 5.9 (H) 02/08/2023       Renal, Liver, Pancreas,  Thyroid, Parathyroid, Prolactin, CPK, Lipids & Vitamin Levels: (i.e. Cr, BUN, Anion Gap, GFR, Urine Specific Gravity, Urine Protein, Microalburnin, AST, ALT, GGT, Alk Phos,Total Bili, Total Protein, Albumin, Ammonia, INR, Amylase, Lipase, TSH, Total T3, Total T4, Free T4 PTH, Prolactin, CPK, Cholesterol, Triglycerides, LDH, HDL, Vitamin B12, Folate, Vitamin D)  Lab Results   Component Value Date    CREATININE 0.9 02/10/2023    BUN 8 02/10/2023    EGFRNORACEVR >60.0 02/10/2023    SPECGRAV 1.015 02/08/2023    PROTEINUA Negative 02/08/2023     (H) 02/10/2023     (H) 02/10/2023    ALKPHOS 172 (H) 02/10/2023    BILITOT 3.1 (H) 02/10/2023    LABPROT 10.9 06/28/2022    ALBUMIN 3.2 (L) 02/10/2023    INR 1.1 06/28/2022    LIPASE 158 (H) 02/08/2023    TSH 1.045 07/01/2022     (H) 07/01/2022    CHOL 88 (L) 02/09/2023    TRIG 649 (H) 02/09/2023    LDLCALC Invalid, Trig>400.0 02/09/2023    HDL 27 (L) 02/09/2023       Infection Diseases, Pregnancy Screenings & Drug Levels: (i.e. Hepatitis Panel, HIV, Syphilis, Urine & Blood Pregnancy Screens, beta hCG, Lithium, Valproic Acid, Carbamazepine, Lamotrigine, Phenytoin, Phenobarbital, Clozapine, Norclozapine, Clozapine + Norclozapine)   Lab Results   Component Value Date    HEPCAB Non-reactive 11/22/2022    [id number] Non-reactive 11/22/2022       Addiction: (i.e. Urine Toxicology, Blood Alcohol, PETH, EtG, EtS, CDT, Buprenorphine, Norbuprenorphine)  Lab Results   Component Value Date    PCDSOBENZOD Presumptive Positive (A) 11/21/2022    BARBITURATES Negative 11/21/2022    PCDSCOMETHA Negative 11/21/2022    OPIATESCREEN Negative 11/21/2022    COCAINEMETAB Negative 11/21/2022    AMPHETAMINES Negative 11/21/2022    MARIJUANATHC Negative 11/21/2022    PCDSOPHENCYN Negative 11/21/2022    ALCOHOLMEDIC <10 02/09/2023       Results for orders placed or performed during the hospital encounter of 02/08/23   EKG 12-lead    Collection Time: 02/08/23  5:48 PM    Narrative     Test Reason : R10.13,    Vent. Rate : 104 BPM     Atrial Rate : 104 BPM     P-R Int : 148 ms          QRS Dur : 088 ms      QT Int : 354 ms       P-R-T Axes : 059 014 040 degrees     QTc Int : 465 ms    Sinus tachycardia  Nonspecific T wave abnormality  Abnormal ECG  When compared with ECG of 23-DEC-2022 21:05,  Nonspecific T wave abnormality now evident in Lateral leads  Confirmed by DADA BAKER MD ([id number]) on 2/9/2023 8:01:53 AM    Referred By: [email or web address]   SELF           Confirmed By:DADA BAKER MD       No results found for this or any previous visit.

## 2023-02-10 NOTE — ED NOTES
Spoke with hospital med team. Placing order for Seroquel. Okayed ativan to be removed as allergy from chart. Pt known to tolerate it. Given last at 1340, no reaction noted from SHAUNA Paul.

## 2023-02-10 NOTE — NURSING
Pt ambulated self off untit and brought back to unit by security. Pt states that he left unit because there was an MD at bedside teasing him with some IV dilaudid. Nurse not notified by [place]. Charge/MD notified.

## 2023-02-10 NOTE — CARE UPDATE
"RAPID RESPONSE NURSE CHART REVIEW       Chart Reviewed: 02/10/2023, 4:03 PM    MRN: [id number]  Bed: 05235/82421 A    Dx: Acute pancreatitis    Lola Wetzel has a past medical history of Alcohol dependence with withdrawal, Alcoholic pancreatitis, Bipolar affective disorder in remission, Hypertension, Hypertriglyceridemia, Insomnia, and Sleep apnea.    Last VS: BP (!) 170/102   Pulse (!) 119   Temp (!) 100.6 °F (38.1 °C)   Resp 17   Ht 6' 3" (1.905 m)   Wt (!) 149.7 kg (330 lb)   SpO2 95%   BMI 41.25 kg/m²     24H Vital Sign Range:  Temp:  [98 °F (36.7 °C)-100.6 °F (38.1 °C)]   Pulse:  []   Resp:  [15-18]   BP: (136-218)/()   SpO2:  [92 %-98 %]     Level of Consciousness (AVPU): alert    Recent Labs     02/08/23  1633 02/09/23  0333 02/10/23  0508   WBC 4.95 6.57 9.15   HGB 14.6 14.0 12.3*   HCT 42.0 42.0 38.4*    247 224       Recent Labs     02/08/23  1633 02/09/23  0333 02/10/23  0508    139 136   K 3.7 4.3 3.7    105 105   CO2 24 22* 20*   CREATININE 0.9 0.9 0.9   * 118* 104   PHOS  --  4.9* 2.4*   MG  --  1.8 1.6        No results for input(s): PH, PCO2, PO2, HCO3, POCSATURATED, BE in the last 72 hours.     OXYGEN:             MEWS score: 3    DR. Chandler   contacted about possibility of blood cultures and antibiotics   No concerns verbalized at this time. Instructed to call [phone] for further concerns or assistance.    Leonardo Rodriguez RN       "

## 2023-02-10 NOTE — NURSING
Patient pulled out 20g IV to left forearm. Patient states it was accidentally. Patient anxious and moving about back and forth. Patient re-educated on the importance of being careful with IV tubing. Charge nurse inserted new IV to left forearm. IV fluids resumed.

## 2023-02-10 NOTE — CARE UPDATE
CARE UPDATE    Called by Rapid Response RN re: fever, tachycardia, hypertension. Given clinical picture, likely related to autonomic response to alcohol withdrawal (last drink was 02/09 10am, and has been drinking heavily) vs pain related to pancreatitis. VS otherwise stable. Patient remains intermittently agitated / confused, with good response to scheduled Valium and PRN Ativan.     -- Bcx x 2  -- UA  -- CXR  -- Trend fever curve  -- Hold Abx unless further organ dysfunction or repeat fever    Zi-on MD Rabia  Internal Medicine PGY-2  [facility]

## 2023-02-10 NOTE — PLAN OF CARE
[facility] - Intensive Care ([facility]-)  Initial Discharge Assessment       Primary Care Provider: Janet Alaniz DO    Admission Diagnosis: Epigastric pain [R10.13]  Chest pain [R07.9]  Alcohol withdrawal syndrome without complication [F10.930]  Alcohol use disorder, severe, dependence [F10.20]  Alcohol-induced acute pancreatitis without infection or necrosis [K85.20]  Alcohol-induced acute pancreatitis, unspecified complication status [K85.20]    Admission Date: 2/8/2023  Expected Discharge Date: 2/13/2023    Discharge Barriers Identified: (P) None    Payor: MEDICAID / Plan: [place] [employer] / Product Type: Managed Medicaid /     Extended Emergency Contact Information  Primary Emergency Contact: No,One   [facility]  Home Phone: [phone]  Relation: Other    Discharge Plan A: (P) Home         [id number] [address] [facility] [address]  [address]  [facility] [address]  Phone: [phone] Fax: [phone]    [id number] [place], [place] - [address] AT [facility]  [address] [place] [address]  Phone: [phone] Fax: [phone]      Initial Assessment (most recent)       Adult Discharge Assessment - 02/10/23 1539          Discharge Assessment    Assessment Type Discharge Planning Assessment (P)      Confirmed/corrected address, phone number and insurance Yes (P)      Confirmed Demographics Correct on Facesheet (P)      Source of Information patient (P)      Reason For Admission pancreatitis (P)      People in Home alone (P)      Do you expect to return to your current living situation? Yes (P)      Do you have help at home or someone to help you manage your care at home? No (P)      Prior to hospitilization cognitive status: Alert/Oriented (P)      Current cognitive status: Alert/Oriented (P)      Readmission within 30 days? No (P)       Patient currently being followed by outpatient case management? No (P)      Do you currently have service(s) that help you manage your care at home? No (P)      Do you take prescription medications? Yes (P)      Do you have prescription coverage? Yes (P)      Coverage medicaid (P)      Do you have any problems affording any of your prescribed medications? No (P)      Is the patient taking medications as prescribed? yes (P)      Who is going to help you get home at discharge? lyft (P)      How do you get to doctors appointments? other (see comments) (P)    lyft    Are you on dialysis? No (P)      Do you take coumadin? No (P)      Discharge Plan A Home (P)      DME Needed Upon Discharge  none (P)      Discharge Plan discussed with: Patient (P)      Discharge Barriers Identified None (P)                           Pt lives alone uses uber or lyft for rides, denies any needs will followup with [facility] for addiction issues, he is not interested in inpt rehab

## 2023-02-10 NOTE — PROGRESS NOTES
02/10/23 1127   Vital Signs   Pulse (!) 112   Heart Rate Source Monitor   Resp 18   SpO2 96 %   Device (Oxygen Therapy) room air   BP (!) 186/110   MAP (mmHg) 142     PRN hydralazine administered. Will repeat B/P. MD notified. Will cont POC

## 2023-02-10 NOTE — NURSING
Pt is restless. Pt grabbed rolling chair out in hallway and almost fell. Pt educated and instructed to stay in room Pt is pacing back and forth in room and hallway. Pt has to cont to be redirected back into room. Pt loss IV access. Scheduled MAG IV medication wasn't completed. Pt states hes anxious .MD notified. Will cont POC

## 2023-02-10 NOTE — PROGRESS NOTES
02/10/23 1547   Vital Signs   Temp (!) 100.6 °F (38.1 °C)   Pulse (!) 119   Resp 17   SpO2 95 %   BP (!) 170/102   MAP (mmHg) 127     Md notified

## 2023-02-10 NOTE — NURSING
pt left floor and ended up in ED requesting ED nurse for dilaudid. pt removed arm band. Pending Telesitter monitoring.Nurse sitting across from pt room. Every time nurse leaves to enter another pt room pt starts to roam. MD notified. Will cont poc

## 2023-02-11 LAB
ALBUMIN SERPL BCP-MCNC: 3.1 G/DL (ref 3.5–5.2)
ALP SERPL-CCNC: 292 U/L (ref 55–135)
ALT SERPL W/O P-5'-P-CCNC: 133 U/L (ref 10–44)
ANION GAP SERPL CALC-SCNC: 12 MMOL/L (ref 8–16)
AST SERPL-CCNC: 201 U/L (ref 10–40)
BASOPHILS # BLD AUTO: 0.03 K/UL (ref 0–0.2)
BASOPHILS NFR BLD: 0.3 % (ref 0–1.9)
BILIRUB SERPL-MCNC: 4.5 MG/DL (ref 0.1–1)
BILIRUB UR QL STRIP: NEGATIVE
BUN SERPL-MCNC: 7 MG/DL (ref 6–20)
CALCIUM SERPL-MCNC: 8.7 MG/DL (ref 8.7–10.5)
CHLORIDE SERPL-SCNC: 103 MMOL/L (ref 95–110)
CLARITY UR REFRACT.AUTO: CLEAR
CO2 SERPL-SCNC: 22 MMOL/L (ref 23–29)
COLOR UR AUTO: YELLOW
CREAT SERPL-MCNC: 0.9 MG/DL (ref 0.5–1.4)
DIFFERENTIAL METHOD: ABNORMAL
EOSINOPHIL # BLD AUTO: 0.1 K/UL (ref 0–0.5)
EOSINOPHIL NFR BLD: 1.2 % (ref 0–8)
ERYTHROCYTE [DISTWIDTH] IN BLOOD BY AUTOMATED COUNT: 14.6 % (ref 11.5–14.5)
EST. GFR  (NO RACE VARIABLE): >60 ML/MIN/1.73 M^2
GLUCOSE SERPL-MCNC: 110 MG/DL (ref 70–110)
GLUCOSE UR QL STRIP: NEGATIVE
HCT VFR BLD AUTO: 34.7 % (ref 40–54)
HGB BLD-MCNC: 11.4 G/DL (ref 14–18)
HGB UR QL STRIP: NEGATIVE
IMM GRANULOCYTES # BLD AUTO: 0.04 K/UL (ref 0–0.04)
IMM GRANULOCYTES NFR BLD AUTO: 0.4 % (ref 0–0.5)
KETONES UR QL STRIP: NEGATIVE
LEUKOCYTE ESTERASE UR QL STRIP: NEGATIVE
LYMPHOCYTES # BLD AUTO: 1.3 K/UL (ref 1–4.8)
LYMPHOCYTES NFR BLD: 12.9 % (ref 18–48)
MAGNESIUM SERPL-MCNC: 1.9 MG/DL (ref 1.6–2.6)
MCH RBC QN AUTO: 29.2 PG (ref 27–31)
MCHC RBC AUTO-ENTMCNC: 32.9 G/DL (ref 32–36)
MCV RBC AUTO: 89 FL (ref 82–98)
MONOCYTES # BLD AUTO: 0.9 K/UL (ref 0.3–1)
MONOCYTES NFR BLD: 9.4 % (ref 4–15)
NEUTROPHILS # BLD AUTO: 7.4 K/UL (ref 1.8–7.7)
NEUTROPHILS NFR BLD: 75.8 % (ref 38–73)
NITRITE UR QL STRIP: NEGATIVE
NRBC BLD-RTO: 0 /100 WBC
PH UR STRIP: 7 [PH] (ref 5–8)
PHOSPHATE SERPL-MCNC: 2.7 MG/DL (ref 2.7–4.5)
PLATELET # BLD AUTO: 193 K/UL (ref 150–450)
PMV BLD AUTO: 10.7 FL (ref 9.2–12.9)
POTASSIUM SERPL-SCNC: 3.4 MMOL/L (ref 3.5–5.1)
PROT SERPL-MCNC: 6.7 G/DL (ref 6–8.4)
PROT UR QL STRIP: NEGATIVE
RBC # BLD AUTO: 3.91 M/UL (ref 4.6–6.2)
SODIUM SERPL-SCNC: 137 MMOL/L (ref 136–145)
SP GR UR STRIP: 1 (ref 1–1.03)
URN SPEC COLLECT METH UR: NORMAL
WBC # BLD AUTO: 9.71 K/UL (ref 3.9–12.7)

## 2023-02-11 PROCEDURE — 63600175 PHARM REV CODE 636 W HCPCS

## 2023-02-11 PROCEDURE — 12000002 HC ACUTE/MED SURGE SEMI-PRIVATE ROOM

## 2023-02-11 PROCEDURE — 80053 COMPREHEN METABOLIC PANEL: CPT

## 2023-02-11 PROCEDURE — 25000003 PHARM REV CODE 250

## 2023-02-11 PROCEDURE — 85025 COMPLETE CBC W/AUTO DIFF WBC: CPT

## 2023-02-11 PROCEDURE — 81003 URINALYSIS AUTO W/O SCOPE: CPT

## 2023-02-11 PROCEDURE — 99233 SBSQ HOSP IP/OBS HIGH 50: CPT | Mod: ,,, | Performed by: INTERNAL MEDICINE

## 2023-02-11 PROCEDURE — 36415 COLL VENOUS BLD VENIPUNCTURE: CPT

## 2023-02-11 PROCEDURE — 83735 ASSAY OF MAGNESIUM: CPT

## 2023-02-11 PROCEDURE — 99233 PR SUBSEQUENT HOSPITAL CARE,LEVL III: ICD-10-PCS | Mod: ,,, | Performed by: INTERNAL MEDICINE

## 2023-02-11 PROCEDURE — 84100 ASSAY OF PHOSPHORUS: CPT

## 2023-02-11 RX ORDER — POLYETHYLENE GLYCOL 3350 17 G/17G
0.4 POWDER, FOR SOLUTION ORAL DAILY
Status: DISCONTINUED | OUTPATIENT
Start: 2023-02-11 | End: 2023-02-11

## 2023-02-11 RX ORDER — KETOROLAC TROMETHAMINE 15 MG/ML
15 INJECTION, SOLUTION INTRAMUSCULAR; INTRAVENOUS EVERY 6 HOURS PRN
Status: DISCONTINUED | OUTPATIENT
Start: 2023-02-11 | End: 2023-02-12

## 2023-02-11 RX ORDER — POLYETHYLENE GLYCOL 3350 17 G/17G
30 POWDER, FOR SOLUTION ORAL DAILY
Status: COMPLETED | OUTPATIENT
Start: 2023-02-11 | End: 2023-02-12

## 2023-02-11 RX ORDER — AMOXICILLIN 250 MG
1 CAPSULE ORAL DAILY PRN
Status: DISCONTINUED | OUTPATIENT
Start: 2023-02-11 | End: 2023-02-13 | Stop reason: HOSPADM

## 2023-02-11 RX ORDER — POTASSIUM CHLORIDE 20 MEQ/1
20 TABLET, EXTENDED RELEASE ORAL ONCE
Status: COMPLETED | OUTPATIENT
Start: 2023-02-11 | End: 2023-02-11

## 2023-02-11 RX ADMIN — PANTOPRAZOLE SODIUM 40 MG: 40 TABLET, DELAYED RELEASE ORAL at 09:02

## 2023-02-11 RX ADMIN — POTASSIUM CHLORIDE 20 MEQ: 1500 TABLET, EXTENDED RELEASE ORAL at 09:02

## 2023-02-11 RX ADMIN — NIFEDIPINE 30 MG: 30 TABLET, FILM COATED, EXTENDED RELEASE ORAL at 09:02

## 2023-02-11 RX ADMIN — POLYETHYLENE GLYCOL 3350 17 G: 17 POWDER, FOR SOLUTION ORAL at 09:02

## 2023-02-11 RX ADMIN — ATORVASTATIN CALCIUM 80 MG: 40 TABLET, FILM COATED ORAL at 09:02

## 2023-02-11 RX ADMIN — ACETAMINOPHEN 1000 MG: 500 TABLET ORAL at 08:02

## 2023-02-11 RX ADMIN — ACETAMINOPHEN 1000 MG: 500 TABLET ORAL at 03:02

## 2023-02-11 RX ADMIN — PROCHLORPERAZINE EDISYLATE 5 MG: 5 INJECTION INTRAMUSCULAR; INTRAVENOUS at 09:02

## 2023-02-11 RX ADMIN — ENOXAPARIN SODIUM 40 MG: 40 INJECTION SUBCUTANEOUS at 05:02

## 2023-02-11 RX ADMIN — KETOROLAC TROMETHAMINE 10 MG: 10 TABLET, FILM COATED ORAL at 07:02

## 2023-02-11 RX ADMIN — SENNOSIDES AND DOCUSATE SODIUM 1 TABLET: 50; 8.6 TABLET ORAL at 09:02

## 2023-02-11 RX ADMIN — ACETAMINOPHEN 1000 MG: 500 TABLET ORAL at 09:02

## 2023-02-11 RX ADMIN — OXYCODONE HYDROCHLORIDE 10 MG: 10 TABLET ORAL at 01:02

## 2023-02-11 RX ADMIN — DIAZEPAM 10 MG: 5 TABLET ORAL at 09:02

## 2023-02-11 RX ADMIN — FOLIC ACID 1 MG: 1 TABLET ORAL at 09:02

## 2023-02-11 RX ADMIN — QUETIAPINE FUMARATE 400 MG: 200 TABLET ORAL at 08:02

## 2023-02-11 RX ADMIN — THERA TABS 1 TABLET: TAB at 09:02

## 2023-02-11 RX ADMIN — LOSARTAN POTASSIUM 50 MG: 50 TABLET, FILM COATED ORAL at 09:02

## 2023-02-11 RX ADMIN — ONDANSETRON 4 MG: 4 TABLET, ORALLY DISINTEGRATING ORAL at 07:02

## 2023-02-11 RX ADMIN — KETOROLAC TROMETHAMINE 15 MG: 15 INJECTION, SOLUTION INTRAMUSCULAR; INTRAVENOUS at 08:02

## 2023-02-11 RX ADMIN — DIAZEPAM 10 MG: 5 TABLET ORAL at 02:02

## 2023-02-11 RX ADMIN — ONDANSETRON 4 MG: 4 TABLET, ORALLY DISINTEGRATING ORAL at 01:02

## 2023-02-11 RX ADMIN — LORAZEPAM 2 MG: 0.5 TABLET ORAL at 01:02

## 2023-02-11 RX ADMIN — OXYCODONE HYDROCHLORIDE 10 MG: 10 TABLET ORAL at 09:02

## 2023-02-11 RX ADMIN — DIAZEPAM 10 MG: 5 TABLET ORAL at 05:02

## 2023-02-11 RX ADMIN — ONDANSETRON 4 MG: 4 TABLET, ORALLY DISINTEGRATING ORAL at 06:02

## 2023-02-11 RX ADMIN — THIAMINE HCL TAB 100 MG 100 MG: 100 TAB at 09:02

## 2023-02-11 RX ADMIN — HYDROMORPHONE HYDROCHLORIDE 0.5 MG: 1 INJECTION, SOLUTION INTRAMUSCULAR; INTRAVENOUS; SUBCUTANEOUS at 03:02

## 2023-02-11 RX ADMIN — HYDROMORPHONE HYDROCHLORIDE 0.5 MG: 1 INJECTION, SOLUTION INTRAMUSCULAR; INTRAVENOUS; SUBCUTANEOUS at 09:02

## 2023-02-11 RX ADMIN — OXYCODONE HYDROCHLORIDE 10 MG: 10 TABLET ORAL at 06:02

## 2023-02-11 RX ADMIN — KETOROLAC TROMETHAMINE 15 MG: 15 INJECTION, SOLUTION INTRAMUSCULAR; INTRAVENOUS at 01:02

## 2023-02-11 RX ADMIN — LORAZEPAM 2 MG: 0.5 TABLET ORAL at 09:02

## 2023-02-11 NOTE — ASSESSMENT & PLAN NOTE
37 year old man with a history of alcohol induced pancreatitis, alcohol dependence, bipolar disorder, HTN, who presented for admission with complaints of epigastric pain, nausea and vomiting that started two days prior to admission. Labs are significant for elevated lipase, transaminases and history of heavy alcohol use in the past few weeks.     He received 2L IV fluid bolus in the ED. CT imaging confirms acute pancreatitis, hepatomegaly, hepatic steatosis    Plan:  - IVF transitioned to clear liquids, advance diet as tolerated  - Aggressive pain regimen: scheduled Tylenol, PRN oxy 10 q6h, PRN ketorolac 10 q6h, breakthrough IV dilaudid 0.5 q6h  - CLD as tolerated  - CT imaging does not show evidence of necrosis, cyst requiring antbiotics at this time

## 2023-02-11 NOTE — PLAN OF CARE
Problem: Adult Inpatient Plan of Care  Goal: Plan of Care Review  Outcome: Ongoing, Progressing  Goal: Patient-Specific Goal (Individualized)  Outcome: Ongoing, Progressing  Goal: Absence of Hospital-Acquired Illness or Injury  Outcome: Ongoing, Progressing  Goal: Optimal Comfort and Wellbeing  Outcome: Ongoing, Progressing  Goal: Readiness for Transition of Care  Outcome: Ongoing, Progressing     Problem: Bariatric Environmental Safety  Goal: Safety Maintained with Care  Outcome: Ongoing, Progressing     Problem: Pain (Pancreatitis)  Goal: Acceptable Pain Control  Outcome: Ongoing, Progressing     Problem: Fall Injury Risk  Goal: Absence of Fall and Fall-Related Injury  Outcome: Ongoing, Progressing     Pt alert and disoriented. VSS. Afebrile at this time. PRN medication given to control fever. MD came to bedside to assess pt (mews 5). Increased temp and and HR. Lactic and CBC ordered. Meds given per MAR. PT restless with anxiety. Sitter at bedside. Avasys order placed for patient safety. Voicing zero unmet needs at this time. Bed locked and lowered. Call light within reach. MARY Howe

## 2023-02-11 NOTE — PROGRESS NOTES
[facility] - [facility] ([address] Medicine  Progress Note    Patient Name: Lola Wetzel  MRN: [id number]  Patient Class: IP- Inpatient   Admission Date: 2/8/2023  Length of Stay: 2 days  Attending Physician: Beatris Chandler MD  Primary Care Provider: Janet Alaniz DO        Subjective:     Principal Problem:Acute pancreatitis        HPI:  Mr. Wetzel is a 37 year old man with a history of alcoholic pancreatitis, alcohol use, bipolar disorder, HTN who presents for admission with complaints of abdominal pain, nausea for the past two days. Patient states that he has been drinking heavily over the past few weeks, increased in the past week. He started having epigastric pain, nausea and vomiting for he past two days that has not abated, prompting him to seek care. He has had similar symptoms in the past. He states he has been dealing with a headache, but denies any tremors, vision changes, hallucinations. He feels he is starting to have some symptoms of withdrawal. He denies a history of seizures with withdrawals and states that he does not react well to lorezapam, developing severe muscle and back spasms the last time he received it. He sees an outpatient addiction psych medicine physician and states that he receives shots to help with his drinking. He has been drinking 48 oz of beer and 1.5L wine daily in addition to hard liquor. Previous notes indicate that he was drinking around 1.5 gallons of alcohol and trying to cut down. His last drink was around 10am the morning of admission.  He denies any tobacco use, drug use. He states he feels that his drinking is worse when he has too much time on his hands, he currently works managing short term rental properties. He was last admitted for alcoholic pancreatitis and withdrawal in November 2022. He has been seen in the ED three times since then for concerns of pancreatitis and withdrawal.  He denies any fever, chills, dysuria, diarrhea, body  aches.    In the ED, vital signs stable. Labs significant for elevated LFTs with , ALT 84, lipase 158. CT imaging showed hepatic steatosis, pancreatitis.       Overview/Hospital Course:  Admitted for acute alcoholic vs hyperlipidemic pancreatitis with concerns for concurrent alcohol withdrawal. Started on diazepam taper with PRN lorazepam. CLD ADAT with pain regimen and IVF. Patient continues to lose IV access due to agitation and confusion. Concern for infection due to fever of 100, infectious work up negative. Pt tolerating clear liquid without emesis, advancing diet. Pain regimen and CIWA protocol in place      Interval History: Pt wandering halls overnight, security brought pt back to room. Pt tolerating clear liquid, advancing diet as tolerated. Pain regimen and CIWA protocol in place    Review of Systems   Constitutional:  Negative for chills and fever.   HENT:  Negative for congestion and sore throat.    Eyes:  Negative for pain and visual disturbance.   Respiratory:  Negative for cough and shortness of breath.    Cardiovascular:  Negative for chest pain and leg swelling.   Gastrointestinal:  Positive for abdominal pain and nausea. Negative for diarrhea and vomiting.   Genitourinary:  Negative for dysuria and hematuria.   Musculoskeletal:  Negative for arthralgias and back pain.   Skin:  Negative for color change and pallor.   Neurological:  Positive for headaches. Negative for light-headedness.   Psychiatric/Behavioral:  Positive for agitation and confusion.    Objective:     Vital Signs (Most Recent):  Temp: 99.5 °F (37.5 °C) (02/11/23 0707)  Pulse: (!) 118 (02/11/23 0707)  Resp: 18 (02/11/23 0707)  BP: 137/83 (02/11/23 0707)  SpO2: (!) 94 % (02/11/23 0707)   Vital Signs (24h Range):  Temp:  [98 °F (36.7 °C)-102.1 °F (38.9 °C)] 99.5 °F (37.5 °C)  Pulse:  [105-125] 118  Resp:  [17-20] 18  SpO2:  [93 %-96 %] 94 %  BP: (117-218)/() 137/83     Weight: (!) 149.7 kg (330 lb)  Body mass index is 41.25  kg/m².  No intake or output data in the 24 hours ending 02/11/23 0930     Physical Exam  Vitals reviewed.   Constitutional:       General: He is not in acute distress.     Appearance: He is ill-appearing.   HENT:      Head: Normocephalic and atraumatic.      Mouth/Throat:      Mouth: Mucous membranes are moist.      Pharynx: Oropharynx is clear.   Eyes:      Extraocular Movements: Extraocular movements intact.      Pupils: Pupils are equal, round, and reactive to light.   Cardiovascular:      Rate and Rhythm: Regular rhythm. Tachycardia present.   Pulmonary:      Effort: Pulmonary effort is normal.      Breath sounds: Normal breath sounds.   Abdominal:      General: Bowel sounds are normal.      Palpations: Abdomen is soft.      Tenderness: There is abdominal tenderness.   Musculoskeletal:         General: Normal range of motion.      Right lower leg: No edema.      Left lower leg: No edema.   Skin:     General: Skin is warm and dry.      Capillary Refill: Capillary refill takes less than 2 seconds.   Neurological:      Mental Status: He is alert and oriented to person, place, and time. Mental status is at baseline.       Significant Labs: All pertinent labs within the past 24 hours have been reviewed.  CBC:   Recent Labs   Lab 02/10/23  0508 02/10/23  2051 02/11/23  0427   WBC 9.15 10.88 9.71   HGB 12.3* 12.6* 11.4*   HCT 38.4* 37.6* 34.7*    219 193       CMP:   Recent Labs   Lab 02/10/23  0508 02/11/23  0427    137   K 3.7 3.4*    103   CO2 20* 22*    110   BUN 8 7   CREATININE 0.9 0.9   CALCIUM 8.4* 8.7   PROT 6.6 6.7   ALBUMIN 3.2* 3.1*   BILITOT 3.1* 4.5*   ALKPHOS 172* 292*   * 201*   * 133*   ANIONGAP 11 12         Significant Imaging: I have reviewed all pertinent imaging results/findings within the past 24 hours.      Assessment/Plan:      * Acute pancreatitis  37 year old man with a history of alcohol induced pancreatitis, alcohol dependence, bipolar disorder, HTN,  who presented for admission with complaints of epigastric pain, nausea and vomiting that started two days prior to admission. Labs are significant for elevated lipase, transaminases and history of heavy alcohol use in the past few weeks.     He received 2L IV fluid bolus in the ED. CT imaging confirms acute pancreatitis, hepatomegaly, hepatic steatosis    Plan:  - IVF transitioned to clear liquids, advance diet as tolerated  - Aggressive pain regimen: scheduled Tylenol, PRN oxy 10 q6h, PRN ketorolac 10 q6h, breakthrough IV dilaudid 0.5 q6h  - CLD as tolerated  - CT imaging does not show evidence of necrosis, cyst requiring antbiotics at this time    Hypertension  Hx of hypertension, patient states he has not been taking antihypertensive medications. Blood pressure elevated on admission.     - Restart losartan 50 mg daily  - Start nifedipine 60 mg daily  - PRN hydralazine PO 25 mg for SBP > 180  - Uptitrate regimen as required    Elevated lipase  See acute pancreatitis      Alcohol use disorder, severe, dependence  Alcohol withdrawal syndrome    Hx of alcohol abuse, hospitalized in the past year for pancreatitis and alcohol withdrawal. Patient states he has an outpatient physician that gives him shots to help reduce his alcohol use. He was previously drinking 1.5 gallons of alcohol in a day, now transitioned from hard liquor to more wine and beer. Patient denies history of withdrawal seizures but has had a bad reaction to lorazepam in the past developing significant muscle spasms.    - CIWA protocol q4h; lorazepam 2 PRN CIWA > 8  - Scheduled diazepam 10 mg Q8h  - PRN nausea medications  - Multivitamin, thiamine 100 mg daily  - Addiction psych recommends contueing home Seroquel and CIWA protocol     Bipolar affective disorder in remission  Continue home seroquel        VTE Risk Mitigation (From admission, onward)         Ordered     enoxaparin injection 40 mg  Daily         02/08/23 9992     IP VTE HIGH RISK PATIENT   Once         02/08/23 2214     Place sequential compression device  Until discontinued         02/08/23 2214                Discharge Planning   MATY: 2/13/2023     Code Status: Full Code   Is the patient medically ready for discharge?:     Reason for patient still in hospital (select all that apply): Patient trending condition  Discharge Plan A: Home                  Nicki Garcia DO  Department of Hospital Medicine   [facility] - Intensive Care ([place])

## 2023-02-11 NOTE — SUBJECTIVE & OBJECTIVE
Interval History: Pt wandering halls overnight, security brought pt back to room. Pt tolerating clear liquid, advancing diet as tolerated. Pain regimen and CIWA protocol in place    Review of Systems   Constitutional:  Negative for chills and fever.   HENT:  Negative for congestion and sore throat.    Eyes:  Negative for pain and visual disturbance.   Respiratory:  Negative for cough and shortness of breath.    Cardiovascular:  Negative for chest pain and leg swelling.   Gastrointestinal:  Positive for abdominal pain and nausea. Negative for diarrhea and vomiting.   Genitourinary:  Negative for dysuria and hematuria.   Musculoskeletal:  Negative for arthralgias and back pain.   Skin:  Negative for color change and pallor.   Neurological:  Positive for headaches. Negative for light-headedness.   Psychiatric/Behavioral:  Positive for agitation and confusion.    Objective:     Vital Signs (Most Recent):  Temp: 99.5 °F (37.5 °C) (02/11/23 0707)  Pulse: (!) 118 (02/11/23 0707)  Resp: 18 (02/11/23 0707)  BP: 137/83 (02/11/23 0707)  SpO2: (!) 94 % (02/11/23 0707)   Vital Signs (24h Range):  Temp:  [98 °F (36.7 °C)-102.1 °F (38.9 °C)] 99.5 °F (37.5 °C)  Pulse:  [105-125] 118  Resp:  [17-20] 18  SpO2:  [93 %-96 %] 94 %  BP: (117-218)/() 137/83     Weight: (!) 149.7 kg (330 lb)  Body mass index is 41.25 kg/m².  No intake or output data in the 24 hours ending 02/11/23 0930     Physical Exam  Vitals reviewed.   Constitutional:       General: He is not in acute distress.     Appearance: He is ill-appearing.   HENT:      Head: Normocephalic and atraumatic.      Mouth/Throat:      Mouth: Mucous membranes are moist.      Pharynx: Oropharynx is clear.   Eyes:      Extraocular Movements: Extraocular movements intact.      Pupils: Pupils are equal, round, and reactive to light.   Cardiovascular:      Rate and Rhythm: Regular rhythm. Tachycardia present.   Pulmonary:      Effort: Pulmonary effort is normal.      Breath sounds:  Normal breath sounds.   Abdominal:      General: Bowel sounds are normal.      Palpations: Abdomen is soft.      Tenderness: There is abdominal tenderness.   Musculoskeletal:         General: Normal range of motion.      Right lower leg: No edema.      Left lower leg: No edema.   Skin:     General: Skin is warm and dry.      Capillary Refill: Capillary refill takes less than 2 seconds.   Neurological:      Mental Status: He is alert and oriented to person, place, and time. Mental status is at baseline.       Significant Labs: All pertinent labs within the past 24 hours have been reviewed.  CBC:   Recent Labs   Lab 02/10/23  0508 02/10/23  2051 02/11/23  0427   WBC 9.15 10.88 9.71   HGB 12.3* 12.6* 11.4*   HCT 38.4* 37.6* 34.7*    219 193       CMP:   Recent Labs   Lab 02/10/23  0508 02/11/23  0427    137   K 3.7 3.4*    103   CO2 20* 22*    110   BUN 8 7   CREATININE 0.9 0.9   CALCIUM 8.4* 8.7   PROT 6.6 6.7   ALBUMIN 3.2* 3.1*   BILITOT 3.1* 4.5*   ALKPHOS 172* 292*   * 201*   * 133*   ANIONGAP 11 12         Significant Imaging: I have reviewed all pertinent imaging results/findings within the past 24 hours.

## 2023-02-11 NOTE — ASSESSMENT & PLAN NOTE
Alcohol withdrawal syndrome    Hx of alcohol abuse, hospitalized in the past year for pancreatitis and alcohol withdrawal. Patient states he has an outpatient physician that gives him shots to help reduce his alcohol use. He was previously drinking 1.5 gallons of alcohol in a day, now transitioned from hard liquor to more wine and beer. Patient denies history of withdrawal seizures but has had a bad reaction to lorazepam in the past developing significant muscle spasms.    - CIWA protocol q4h; lorazepam 2 PRN CIWA > 8  - Scheduled diazepam 10 mg Q8h  - PRN nausea medications  - Multivitamin, thiamine 100 mg daily  - Addiction psych recommends contueing home Seroquel and CIWA protocol

## 2023-02-12 LAB
ALBUMIN SERPL BCP-MCNC: 2.9 G/DL (ref 3.5–5.2)
ALP SERPL-CCNC: 412 U/L (ref 55–135)
ALT SERPL W/O P-5'-P-CCNC: 257 U/L (ref 10–44)
AMMONIA PLAS-SCNC: 37 UMOL/L (ref 10–50)
ANION GAP SERPL CALC-SCNC: 11 MMOL/L (ref 8–16)
APAP SERPL-MCNC: <3 UG/ML (ref 10–20)
AST SERPL-CCNC: 458 U/L (ref 10–40)
BASOPHILS # BLD AUTO: 0.05 K/UL (ref 0–0.2)
BASOPHILS NFR BLD: 0.7 % (ref 0–1.9)
BILIRUB SERPL-MCNC: 5.3 MG/DL (ref 0.1–1)
BUN SERPL-MCNC: 7 MG/DL (ref 6–20)
CALCIUM SERPL-MCNC: 8.7 MG/DL (ref 8.7–10.5)
CHLORIDE SERPL-SCNC: 103 MMOL/L (ref 95–110)
CO2 SERPL-SCNC: 22 MMOL/L (ref 23–29)
CREAT SERPL-MCNC: 1 MG/DL (ref 0.5–1.4)
DIFFERENTIAL METHOD: ABNORMAL
EOSINOPHIL # BLD AUTO: 0.2 K/UL (ref 0–0.5)
EOSINOPHIL NFR BLD: 2.3 % (ref 0–8)
ERYTHROCYTE [DISTWIDTH] IN BLOOD BY AUTOMATED COUNT: 14.9 % (ref 11.5–14.5)
EST. GFR  (NO RACE VARIABLE): >60 ML/MIN/1.73 M^2
ETHANOL SERPL-MCNC: <10 MG/DL
FOLATE SERPL-MCNC: 17.7 NG/ML (ref 4–24)
GLUCOSE SERPL-MCNC: 106 MG/DL (ref 70–110)
HAV IGM SERPL QL IA: NORMAL
HBV CORE IGM SERPL QL IA: NORMAL
HBV SURFACE AG SERPL QL IA: NORMAL
HCT VFR BLD AUTO: 33.2 % (ref 40–54)
HCV AB SERPL QL IA: NORMAL
HGB BLD-MCNC: 11.1 G/DL (ref 14–18)
IMM GRANULOCYTES # BLD AUTO: 0.05 K/UL (ref 0–0.04)
IMM GRANULOCYTES NFR BLD AUTO: 0.7 % (ref 0–0.5)
LYMPHOCYTES # BLD AUTO: 1.3 K/UL (ref 1–4.8)
LYMPHOCYTES NFR BLD: 17.5 % (ref 18–48)
MAGNESIUM SERPL-MCNC: 2.1 MG/DL (ref 1.6–2.6)
MCH RBC QN AUTO: 29.5 PG (ref 27–31)
MCHC RBC AUTO-ENTMCNC: 33.4 G/DL (ref 32–36)
MCV RBC AUTO: 88 FL (ref 82–98)
MONOCYTES # BLD AUTO: 0.7 K/UL (ref 0.3–1)
MONOCYTES NFR BLD: 9 % (ref 4–15)
NEUTROPHILS # BLD AUTO: 5.1 K/UL (ref 1.8–7.7)
NEUTROPHILS NFR BLD: 69.8 % (ref 38–73)
NRBC BLD-RTO: 0 /100 WBC
PHOSPHATE SERPL-MCNC: 3.1 MG/DL (ref 2.7–4.5)
PLATELET # BLD AUTO: 200 K/UL (ref 150–450)
PMV BLD AUTO: 10.6 FL (ref 9.2–12.9)
POTASSIUM SERPL-SCNC: 3.9 MMOL/L (ref 3.5–5.1)
PROT SERPL-MCNC: 6.7 G/DL (ref 6–8.4)
RBC # BLD AUTO: 3.76 M/UL (ref 4.6–6.2)
SODIUM SERPL-SCNC: 136 MMOL/L (ref 136–145)
VIT B12 SERPL-MCNC: 1018 PG/ML (ref 210–950)
WBC # BLD AUTO: 7.36 K/UL (ref 3.9–12.7)

## 2023-02-12 PROCEDURE — 84425 ASSAY OF VITAMIN B-1: CPT

## 2023-02-12 PROCEDURE — 36415 COLL VENOUS BLD VENIPUNCTURE: CPT

## 2023-02-12 PROCEDURE — 99233 PR SUBSEQUENT HOSPITAL CARE,LEVL III: ICD-10-PCS | Mod: ,,, | Performed by: INTERNAL MEDICINE

## 2023-02-12 PROCEDURE — 25000003 PHARM REV CODE 250

## 2023-02-12 PROCEDURE — 12000002 HC ACUTE/MED SURGE SEMI-PRIVATE ROOM

## 2023-02-12 PROCEDURE — 80074 ACUTE HEPATITIS PANEL: CPT

## 2023-02-12 PROCEDURE — 99233 SBSQ HOSP IP/OBS HIGH 50: CPT | Mod: ,,, | Performed by: INTERNAL MEDICINE

## 2023-02-12 PROCEDURE — 63600175 PHARM REV CODE 636 W HCPCS

## 2023-02-12 PROCEDURE — 80053 COMPREHEN METABOLIC PANEL: CPT

## 2023-02-12 PROCEDURE — 84100 ASSAY OF PHOSPHORUS: CPT

## 2023-02-12 PROCEDURE — 82140 ASSAY OF AMMONIA: CPT

## 2023-02-12 PROCEDURE — 80143 DRUG ASSAY ACETAMINOPHEN: CPT

## 2023-02-12 PROCEDURE — 82607 VITAMIN B-12: CPT

## 2023-02-12 PROCEDURE — 82746 ASSAY OF FOLIC ACID SERUM: CPT

## 2023-02-12 PROCEDURE — 85025 COMPLETE CBC W/AUTO DIFF WBC: CPT

## 2023-02-12 PROCEDURE — 83735 ASSAY OF MAGNESIUM: CPT

## 2023-02-12 PROCEDURE — 82077 ASSAY SPEC XCP UR&BREATH IA: CPT

## 2023-02-12 RX ORDER — HYDROMORPHONE HYDROCHLORIDE 1 MG/ML
0.5 INJECTION, SOLUTION INTRAMUSCULAR; INTRAVENOUS; SUBCUTANEOUS EVERY 8 HOURS PRN
Status: DISCONTINUED | OUTPATIENT
Start: 2023-02-12 | End: 2023-02-12

## 2023-02-12 RX ORDER — OXYCODONE HYDROCHLORIDE 10 MG/1
10 TABLET ORAL EVERY 4 HOURS PRN
Status: DISCONTINUED | OUTPATIENT
Start: 2023-02-12 | End: 2023-02-12

## 2023-02-12 RX ORDER — KETOROLAC TROMETHAMINE 15 MG/ML
15 INJECTION, SOLUTION INTRAMUSCULAR; INTRAVENOUS ONCE AS NEEDED
Status: COMPLETED | OUTPATIENT
Start: 2023-02-12 | End: 2023-02-12

## 2023-02-12 RX ORDER — OXYCODONE HYDROCHLORIDE 10 MG/1
10 TABLET ORAL
Status: DISCONTINUED | OUTPATIENT
Start: 2023-02-12 | End: 2023-02-13 | Stop reason: HOSPADM

## 2023-02-12 RX ORDER — IBUPROFEN 200 MG
1 TABLET ORAL DAILY
Status: DISCONTINUED | OUTPATIENT
Start: 2023-02-13 | End: 2023-02-13

## 2023-02-12 RX ORDER — CLONIDINE HYDROCHLORIDE 0.1 MG/1
0.2 TABLET ORAL DAILY PRN
Status: DISCONTINUED | OUTPATIENT
Start: 2023-02-12 | End: 2023-02-13 | Stop reason: HOSPADM

## 2023-02-12 RX ORDER — QUETIAPINE FUMARATE 200 MG/1
600 TABLET, FILM COATED ORAL NIGHTLY
Status: DISCONTINUED | OUTPATIENT
Start: 2023-02-12 | End: 2023-02-13 | Stop reason: HOSPADM

## 2023-02-12 RX ORDER — KETOROLAC TROMETHAMINE 10 MG/1
10 TABLET, FILM COATED ORAL EVERY 6 HOURS PRN
Status: DISCONTINUED | OUTPATIENT
Start: 2023-02-12 | End: 2023-02-13 | Stop reason: HOSPADM

## 2023-02-12 RX ORDER — TALC
12 POWDER (GRAM) TOPICAL NIGHTLY PRN
Status: DISCONTINUED | OUTPATIENT
Start: 2023-02-12 | End: 2023-02-13 | Stop reason: HOSPADM

## 2023-02-12 RX ADMIN — QUETIAPINE FUMARATE 600 MG: 200 TABLET ORAL at 08:02

## 2023-02-12 RX ADMIN — DIAZEPAM 10 MG: 5 TABLET ORAL at 10:02

## 2023-02-12 RX ADMIN — HYDROMORPHONE HYDROCHLORIDE 0.5 MG: 1 INJECTION, SOLUTION INTRAMUSCULAR; INTRAVENOUS; SUBCUTANEOUS at 04:02

## 2023-02-12 RX ADMIN — OXYCODONE HYDROCHLORIDE 10 MG: 10 TABLET ORAL at 08:02

## 2023-02-12 RX ADMIN — DIAZEPAM 10 MG: 5 TABLET ORAL at 01:02

## 2023-02-12 RX ADMIN — THIAMINE HCL TAB 100 MG 100 MG: 100 TAB at 10:02

## 2023-02-12 RX ADMIN — Medication 12 MG: at 08:02

## 2023-02-12 RX ADMIN — DIAZEPAM 10 MG: 5 TABLET ORAL at 05:02

## 2023-02-12 RX ADMIN — LORAZEPAM 2 MG: 0.5 TABLET ORAL at 12:02

## 2023-02-12 RX ADMIN — KETOROLAC TROMETHAMINE 15 MG: 15 INJECTION, SOLUTION INTRAMUSCULAR; INTRAVENOUS at 01:02

## 2023-02-12 RX ADMIN — ATORVASTATIN CALCIUM 80 MG: 40 TABLET, FILM COATED ORAL at 10:02

## 2023-02-12 RX ADMIN — POLYETHYLENE GLYCOL 3350 30 G: 17 POWDER, FOR SOLUTION ORAL at 10:02

## 2023-02-12 RX ADMIN — KETOROLAC TROMETHAMINE 10 MG: 10 TABLET, FILM COATED ORAL at 11:02

## 2023-02-12 RX ADMIN — ENOXAPARIN SODIUM 40 MG: 40 INJECTION SUBCUTANEOUS at 05:02

## 2023-02-12 RX ADMIN — OXYCODONE HYDROCHLORIDE 10 MG: 10 TABLET ORAL at 03:02

## 2023-02-12 RX ADMIN — PANTOPRAZOLE SODIUM 40 MG: 40 TABLET, DELAYED RELEASE ORAL at 10:02

## 2023-02-12 RX ADMIN — NIFEDIPINE 30 MG: 30 TABLET, FILM COATED, EXTENDED RELEASE ORAL at 10:02

## 2023-02-12 RX ADMIN — FOLIC ACID 1 MG: 1 TABLET ORAL at 10:02

## 2023-02-12 RX ADMIN — LOSARTAN POTASSIUM 50 MG: 50 TABLET, FILM COATED ORAL at 10:02

## 2023-02-12 RX ADMIN — LORAZEPAM 2 MG: 0.5 TABLET ORAL at 10:02

## 2023-02-12 RX ADMIN — KETOROLAC TROMETHAMINE 15 MG: 15 INJECTION, SOLUTION INTRAMUSCULAR; INTRAVENOUS at 05:02

## 2023-02-12 RX ADMIN — THERA TABS 1 TABLET: TAB at 10:02

## 2023-02-12 RX ADMIN — OXYCODONE HYDROCHLORIDE 10 MG: 10 TABLET ORAL at 11:02

## 2023-02-12 RX ADMIN — OXYCODONE HYDROCHLORIDE 10 MG: 10 TABLET ORAL at 05:02

## 2023-02-12 NOTE — NURSING
"Pt had a restless night. Repeatedly ask for more pain medications even after administration of prn meds. Explain to Pt the importance of following the schedule prn regimen to prevent possible overdose. Pt is very attention-seeking, continuously ignore unit rules of staying in room. Pt requested to speak with security regarding his "lost phone", once security arrived to unit, Pt stated "he was joking" but then said he wasn't. Pt requested to go to vending machine to get a drink, ask pt if he had money, Pt responded "yea" . Charge RN escorted Pt to machine, and Pt responded he did not have money. Pt is requesting to speak with Team regarding increasing medications, so he can rest.   "

## 2023-02-12 NOTE — NURSING
"Patient asked if he could order some food, I told him yes and that Myself or the tech will go down to get it. He ended up walking down with the tech. The tech reported to me that when they were down there he called [employer] and was trying to leave to go buy a "pen". When they got back up to the unit I told him that was not ok to leave the facility and or floor. He told me that he thought that because he was the tech that she could leave with him. I educated him that  he cant leave.   "

## 2023-02-12 NOTE — PLAN OF CARE
Problem: Adult Inpatient Plan of Care  Goal: Plan of Care Review  Outcome: Ongoing, Progressing     Problem: Pain (Pancreatitis)  Goal: Acceptable Pain Control  Outcome: Ongoing, Progressing  Pain assessed and pt c/o upper abdominal pain; prn oral oxycodone and prn IVP torodol and prn IVP dilaudid administered and pt verbalized moderate relief of pain.     Problem: Alcohol Withdrawal  Goal: Alcohol Withdrawal Symptom Control  Outcome: Ongoing, Progressing  One episode of nausea and vomit and tremor and visual disturbance verbalized and noted; prn po ativan administered.     Problem: Fall Injury Risk  Goal: Absence of Fall and Fall-Related Injury  Outcome: Ongoing, Progressing  Pt free of fall this shift.    Will continue to monitor pt.

## 2023-02-12 NOTE — SUBJECTIVE & OBJECTIVE
Interval History: Overnight pt was agitated but redirectable. Pt tolerating diet with no emesis. Deescalating pain regimen. [facility] protocol in place. Liver Doppler to be done today    Review of Systems   Constitutional:  Negative for chills and fever.   HENT:  Negative for congestion and sore throat.    Eyes:  Negative for pain and visual disturbance.   Respiratory:  Negative for cough and shortness of breath.    Cardiovascular:  Negative for chest pain and leg swelling.   Gastrointestinal:  Positive for abdominal pain and nausea. Negative for diarrhea and vomiting.   Genitourinary:  Negative for dysuria and hematuria.   Musculoskeletal:  Negative for arthralgias and back pain.   Skin:  Negative for color change and pallor.   Neurological:  Positive for headaches. Negative for light-headedness.   Psychiatric/Behavioral:  Positive for agitation and confusion.    Objective:     Vital Signs (Most Recent):  Temp: 98.6 °F (37 °C) (02/12/23 0751)  Pulse: 101 (02/12/23 0751)  Resp: 18 (02/12/23 0751)  BP: (!) 144/94 (02/12/23 0751)  SpO2: 99 % (02/12/23 0751)   Vital Signs (24h Range):  Temp:  [98 °F (36.7 °C)-99.2 °F (37.3 °C)] 98.6 °F (37 °C)  Pulse:  [] 101  Resp:  [16-20] 18  SpO2:  [93 %-99 %] 99 %  BP: (117-177)/(63-94) 144/94     Weight: (!) 163.1 kg (359 lb 9.1 oz) (standing)  Body mass index is 44.94 kg/m².  No intake or output data in the 24 hours ending 02/12/23 0834     Physical Exam  Vitals reviewed.   Constitutional:       General: He is not in acute distress.     Appearance: He is ill-appearing.   HENT:      Head: Normocephalic and atraumatic.      Mouth/Throat:      Mouth: Mucous membranes are moist.      Pharynx: Oropharynx is clear.   Eyes:      Extraocular Movements: Extraocular movements intact.      Pupils: Pupils are equal, round, and reactive to light.   Cardiovascular:      Rate and Rhythm: Regular rhythm. Tachycardia present.   Pulmonary:      Effort: Pulmonary effort is normal.      Breath  sounds: Normal breath sounds.   Abdominal:      General: Bowel sounds are normal.      Palpations: Abdomen is soft.      Tenderness: There is abdominal tenderness.   Musculoskeletal:         General: Normal range of motion.      Right lower leg: No edema.      Left lower leg: No edema.   Skin:     General: Skin is warm and dry.      Capillary Refill: Capillary refill takes less than 2 seconds.   Neurological:      Mental Status: He is alert and oriented to person, place, and time. Mental status is at baseline.       Significant Labs: All pertinent labs within the past 24 hours have been reviewed.  CBC:   Recent Labs   Lab 02/10/23  2051 02/11/23  0427 02/12/23  0238   WBC 10.88 9.71 7.36   HGB 12.6* 11.4* 11.1*   HCT 37.6* 34.7* 33.2*    193 200       CMP:   Recent Labs   Lab 02/11/23 0427 02/12/23 0238    136   K 3.4* 3.9    103   CO2 22* 22*    106   BUN 7 7   CREATININE 0.9 1.0   CALCIUM 8.7 8.7   PROT 6.7 6.7   ALBUMIN 3.1* 2.9*   BILITOT 4.5* 5.3*   ALKPHOS 292* 412*   * 458*   * 257*   ANIONGAP 12 11         Significant Imaging: I have reviewed all pertinent imaging results/findings within the past 24 hours.

## 2023-02-12 NOTE — ASSESSMENT & PLAN NOTE
Alcohol withdrawal syndrome    Hx of alcohol abuse, hospitalized in the past year for pancreatitis and alcohol withdrawal. Patient states he has an outpatient physician that gives him shots to help reduce his alcohol use. He was previously drinking 1.5 gallons of alcohol in a day, now transitioned from hard liquor to more wine and beer. Patient denies history of withdrawal seizures but has had a bad reaction to lorazepam in the past developing significant muscle spasms.    - CIWA protocol q4h; lorazepam 2 PRN CIWA > 8  - Scheduled diazepam 10 mg Q8h, ativan for breakthrough   - PRN nausea medications  - Multivitamin, thiamine 100 mg daily  - Addiction psych recommends contueing home Seroquel and CIWA protocol

## 2023-02-12 NOTE — PROGRESS NOTES
[facility] - [facility] ([address] Medicine  Progress Note    Patient Name: Lola Wetzel  MRN: [id number]  Patient Class: IP- Inpatient   Admission Date: 2/8/2023  Length of Stay: 3 days  Attending Physician: Beatris Chandler MD  Primary Care Provider: Janet Alaniz DO        Subjective:     Principal Problem:Acute pancreatitis        HPI:  Mr. Wetzel is a 37 year old man with a history of alcoholic pancreatitis, alcohol use, bipolar disorder, HTN who presents for admission with complaints of abdominal pain, nausea for the past two days. Patient states that he has been drinking heavily over the past few weeks, increased in the past week. He started having epigastric pain, nausea and vomiting for he past two days that has not abated, prompting him to seek care. He has had similar symptoms in the past. He states he has been dealing with a headache, but denies any tremors, vision changes, hallucinations. He feels he is starting to have some symptoms of withdrawal. He denies a history of seizures with withdrawals and states that he does not react well to lorezapam, developing severe muscle and back spasms the last time he received it. He sees an outpatient addiction psych medicine physician and states that he receives shots to help with his drinking. He has been drinking 48 oz of beer and 1.5L wine daily in addition to hard liquor. Previous notes indicate that he was drinking around 1.5 gallons of alcohol and trying to cut down. His last drink was around 10am the morning of admission.  He denies any tobacco use, drug use. He states he feels that his drinking is worse when he has too much time on his hands, he currently works managing short term rental properties. He was last admitted for alcoholic pancreatitis and withdrawal in November 2022. He has been seen in the ED three times since then for concerns of pancreatitis and withdrawal.  He denies any fever, chills, dysuria, diarrhea, body  aches.    In the ED, vital signs stable. Labs significant for elevated LFTs with , ALT 84, lipase 158. CT imaging showed hepatic steatosis, pancreatitis.       Overview/Hospital Course:  Admitted for acute alcoholic vs hyperlipidemic pancreatitis with concerns for concurrent alcohol withdrawal. Started on diazepam taper with PRN lorazepam. CLD ADAT with pain regimen and IVF. Patient continues to lose IV access due to agitation and confusion. Concern for infection due to fever of 100, infectious work up negative. Pt tolerating clear liquid without emesis, advancing diet. Pain regimen and CIWA protocol in place. Pain regimen deescalated to orals. Liver enzymes increasing, Liver doppler ordered.       Interval History: Overnight pt was agitated but redirectable. Pt tolerating diet with no emesis. Deescalating pain regimen. CIWA protocol in place. Liver Doppler to be done today    Review of Systems   Constitutional:  Negative for chills and fever.   HENT:  Negative for congestion and sore throat.    Eyes:  Negative for pain and visual disturbance.   Respiratory:  Negative for cough and shortness of breath.    Cardiovascular:  Negative for chest pain and leg swelling.   Gastrointestinal:  Positive for abdominal pain and nausea. Negative for diarrhea and vomiting.   Genitourinary:  Negative for dysuria and hematuria.   Musculoskeletal:  Negative for arthralgias and back pain.   Skin:  Negative for color change and pallor.   Neurological:  Positive for headaches. Negative for light-headedness.   Psychiatric/Behavioral:  Positive for agitation and confusion.    Objective:     Vital Signs (Most Recent):  Temp: 98.6 °F (37 °C) (02/12/23 0751)  Pulse: 101 (02/12/23 0751)  Resp: 18 (02/12/23 0751)  BP: (!) 144/94 (02/12/23 0751)  SpO2: 99 % (02/12/23 0751)   Vital Signs (24h Range):  Temp:  [98 °F (36.7 °C)-99.2 °F (37.3 °C)] 98.6 °F (37 °C)  Pulse:  [] 101  Resp:  [16-20] 18  SpO2:  [93 %-99 %] 99 %  BP:  (117-177)/(63-94) 144/94     Weight: (!) 163.1 kg (359 lb 9.1 oz) (standing)  Body mass index is 44.94 kg/m².  No intake or output data in the 24 hours ending 02/12/23 0834     Physical Exam  Vitals reviewed.   Constitutional:       General: He is not in acute distress.     Appearance: He is ill-appearing.   HENT:      Head: Normocephalic and atraumatic.      Mouth/Throat:      Mouth: Mucous membranes are moist.      Pharynx: Oropharynx is clear.   Eyes:      Extraocular Movements: Extraocular movements intact.      Pupils: Pupils are equal, round, and reactive to light.   Cardiovascular:      Rate and Rhythm: Regular rhythm. Tachycardia present.   Pulmonary:      Effort: Pulmonary effort is normal.      Breath sounds: Normal breath sounds.   Abdominal:      General: Bowel sounds are normal.      Palpations: Abdomen is soft.      Tenderness: There is abdominal tenderness.   Musculoskeletal:         General: Normal range of motion.      Right lower leg: No edema.      Left lower leg: No edema.   Skin:     General: Skin is warm and dry.      Capillary Refill: Capillary refill takes less than 2 seconds.   Neurological:      Mental Status: He is alert and oriented to person, place, and time. Mental status is at baseline.       Significant Labs: All pertinent labs within the past 24 hours have been reviewed.  CBC:   Recent Labs   Lab 02/10/23  2051 02/11/23  0427 02/12/23  0238   WBC 10.88 9.71 7.36   HGB 12.6* 11.4* 11.1*   HCT 37.6* 34.7* 33.2*    193 200       CMP:   Recent Labs   Lab 02/11/23 0427 02/12/23  0238    136   K 3.4* 3.9    103   CO2 22* 22*    106   BUN 7 7   CREATININE 0.9 1.0   CALCIUM 8.7 8.7   PROT 6.7 6.7   ALBUMIN 3.1* 2.9*   BILITOT 4.5* 5.3*   ALKPHOS 292* 412*   * 458*   * 257*   ANIONGAP 12 11         Significant Imaging: I have reviewed all pertinent imaging results/findings within the past 24 hours.      Assessment/Plan:      * Acute pancreatitis  37  year old man with a history of alcohol induced pancreatitis, alcohol dependence, bipolar disorder, HTN, who presented for admission with complaints of epigastric pain, nausea and vomiting that started two days prior to admission. Labs are significant for elevated lipase, transaminases and history of heavy alcohol use in the past few weeks.     He received 2L IV fluid bolus in the ED. CT imaging confirms acute pancreatitis, hepatomegaly, hepatic steatosis    Plan:  - IVF transitioned to clear liquids, advance diet as tolerated  - Deesclating pain regimen to only orals.   - CLD as tolerated  - CT imaging does not show evidence of necrosis, cyst requiring antbiotics at this time  - Liver enzymes rising, liver doppler u/s pending    Hypertension  Hx of hypertension, patient states he has not been taking antihypertensive medications. Blood pressure elevated on admission.     - Restart losartan 50 mg daily  - Start nifedipine 60 mg daily  - PRN clonidine PO for SBP >170  - Uptitrate regimen as required    Elevated lipase  See acute pancreatitis      Alcohol use disorder, severe, dependence  Alcohol withdrawal syndrome    Hx of alcohol abuse, hospitalized in the past year for pancreatitis and alcohol withdrawal. Patient states he has an outpatient physician that gives him shots to help reduce his alcohol use. He was previously drinking 1.5 gallons of alcohol in a day, now transitioned from hard liquor to more wine and beer. Patient denies history of withdrawal seizures but has had a bad reaction to lorazepam in the past developing significant muscle spasms.    - CIWA protocol q4h; lorazepam 2 PRN CIWA > 8  - Scheduled diazepam 10 mg Q8h, ativan for breakthrough   - PRN nausea medications  - Multivitamin, thiamine 100 mg daily  - Addiction psych recommends contueing home Seroquel and CIWA protocol     Bipolar affective disorder in remission  Continue home seroquel        VTE Risk Mitigation (From admission, onward)          Ordered     enoxaparin injection 40 mg  Daily         02/08/23 2214     IP VTE HIGH RISK PATIENT  Once         02/08/23 2214     Place sequential compression device  Until discontinued         02/08/23 2214                Discharge Planning   MATY: 2/13/2023     Code Status: Full Code   Is the patient medically ready for discharge?:     Reason for patient still in hospital (select all that apply): Patient trending condition  Discharge Plan A: Home                  Nicki Garcia DO  Department of Hospital Medicine   [facility] - Intensive Care ([place]-16)

## 2023-02-12 NOTE — ASSESSMENT & PLAN NOTE
37 year old man with a history of alcohol induced pancreatitis, alcohol dependence, bipolar disorder, HTN, who presented for admission with complaints of epigastric pain, nausea and vomiting that started two days prior to admission. Labs are significant for elevated lipase, transaminases and history of heavy alcohol use in the past few weeks.     He received 2L IV fluid bolus in the ED. CT imaging confirms acute pancreatitis, hepatomegaly, hepatic steatosis    Plan:  - IVF transitioned to clear liquids, advance diet as tolerated  - Deesclating pain regimen to only orals.   - CLD as tolerated  - CT imaging does not show evidence of necrosis, cyst requiring antbiotics at this time  - Liver enzymes rising, liver doppler u/s pending

## 2023-02-12 NOTE — ASSESSMENT & PLAN NOTE
Hx of hypertension, patient states he has not been taking antihypertensive medications. Blood pressure elevated on admission.     - Restart losartan 50 mg daily  - Start nifedipine 60 mg daily  - PRN clonidine PO for SBP >170  - Uptitrate regimen as required

## 2023-02-12 NOTE — PLAN OF CARE
Problem: Adult Inpatient Plan of Care  Goal: Plan of Care Review  Outcome: Ongoing, Progressing  Goal: Patient-Specific Goal (Individualized)  Outcome: Ongoing, Progressing  Goal: Absence of Hospital-Acquired Illness or Injury  Outcome: Ongoing, Progressing  Goal: Optimal Comfort and Wellbeing  Outcome: Ongoing, Progressing  Goal: Readiness for Transition of Care  Outcome: Ongoing, Progressing     Problem: Bariatric Environmental Safety  Goal: Safety Maintained with Care  Outcome: Ongoing, Progressing     Problem: Pain (Pancreatitis)  Goal: Acceptable Pain Control  Outcome: Ongoing, Progressing     Problem: Fall Injury Risk  Goal: Absence of Fall and Fall-Related Injury  Outcome: Ongoing, Progressing     Problem: Pain Acute  Goal: Acceptable Pain Control and Functional Ability  Outcome: Ongoing, Progressing     Problem: Alcohol Withdrawal  Goal: Alcohol Withdrawal Symptom Control  Outcome: Ongoing, Progressing     Problem: Acute Neurologic Deterioration (Alcohol Withdrawal)  Goal: Optimal Neurologic Function  Outcome: Ongoing, Progressing     Problem: Substance Misuse (Alcohol Withdrawal)  Goal: Readiness for Change Identified  Outcome: Ongoing, Progressing

## 2023-02-13 VITALS
DIASTOLIC BLOOD PRESSURE: 76 MMHG | BODY MASS INDEX: 39.17 KG/M2 | RESPIRATION RATE: 18 BRPM | OXYGEN SATURATION: 90 % | SYSTOLIC BLOOD PRESSURE: 139 MMHG | HEIGHT: 75 IN | TEMPERATURE: 99 F | WEIGHT: 315 LBS | HEART RATE: 102 BPM

## 2023-02-13 LAB
ALBUMIN SERPL BCP-MCNC: 2.8 G/DL (ref 3.5–5.2)
ALP SERPL-CCNC: 481 U/L (ref 55–135)
ALT SERPL W/O P-5'-P-CCNC: 355 U/L (ref 10–44)
ANION GAP SERPL CALC-SCNC: 8 MMOL/L (ref 8–16)
AST SERPL-CCNC: 485 U/L (ref 10–40)
BASOPHILS # BLD AUTO: 0.05 K/UL (ref 0–0.2)
BASOPHILS NFR BLD: 0.8 % (ref 0–1.9)
BILIRUB SERPL-MCNC: 4.3 MG/DL (ref 0.1–1)
BUN SERPL-MCNC: 8 MG/DL (ref 6–20)
CALCIUM SERPL-MCNC: 8.8 MG/DL (ref 8.7–10.5)
CHLORIDE SERPL-SCNC: 100 MMOL/L (ref 95–110)
CO2 SERPL-SCNC: 26 MMOL/L (ref 23–29)
CREAT SERPL-MCNC: 1.1 MG/DL (ref 0.5–1.4)
DIFFERENTIAL METHOD: ABNORMAL
EOSINOPHIL # BLD AUTO: 0.1 K/UL (ref 0–0.5)
EOSINOPHIL NFR BLD: 2 % (ref 0–8)
ERYTHROCYTE [DISTWIDTH] IN BLOOD BY AUTOMATED COUNT: 15.2 % (ref 11.5–14.5)
EST. GFR  (NO RACE VARIABLE): >60 ML/MIN/1.73 M^2
GLUCOSE SERPL-MCNC: 104 MG/DL (ref 70–110)
HCT VFR BLD AUTO: 31.4 % (ref 40–54)
HGB BLD-MCNC: 10.6 G/DL (ref 14–18)
IMM GRANULOCYTES # BLD AUTO: 0.04 K/UL (ref 0–0.04)
IMM GRANULOCYTES NFR BLD AUTO: 0.7 % (ref 0–0.5)
LYMPHOCYTES # BLD AUTO: 1.3 K/UL (ref 1–4.8)
LYMPHOCYTES NFR BLD: 22.1 % (ref 18–48)
MAGNESIUM SERPL-MCNC: 2.1 MG/DL (ref 1.6–2.6)
MCH RBC QN AUTO: 29.2 PG (ref 27–31)
MCHC RBC AUTO-ENTMCNC: 33.8 G/DL (ref 32–36)
MCV RBC AUTO: 87 FL (ref 82–98)
MONOCYTES # BLD AUTO: 0.8 K/UL (ref 0.3–1)
MONOCYTES NFR BLD: 12.9 % (ref 4–15)
NEUTROPHILS # BLD AUTO: 3.7 K/UL (ref 1.8–7.7)
NEUTROPHILS NFR BLD: 61.5 % (ref 38–73)
NRBC BLD-RTO: 0 /100 WBC
PHOSPHATE SERPL-MCNC: 3.3 MG/DL (ref 2.7–4.5)
PLATELET # BLD AUTO: 232 K/UL (ref 150–450)
PMV BLD AUTO: 10.5 FL (ref 9.2–12.9)
POTASSIUM SERPL-SCNC: 3.6 MMOL/L (ref 3.5–5.1)
PROT SERPL-MCNC: 6.7 G/DL (ref 6–8.4)
RBC # BLD AUTO: 3.63 M/UL (ref 4.6–6.2)
SODIUM SERPL-SCNC: 134 MMOL/L (ref 136–145)
WBC # BLD AUTO: 5.96 K/UL (ref 3.9–12.7)

## 2023-02-13 PROCEDURE — 85025 COMPLETE CBC W/AUTO DIFF WBC: CPT

## 2023-02-13 PROCEDURE — 63600175 PHARM REV CODE 636 W HCPCS

## 2023-02-13 PROCEDURE — 84100 ASSAY OF PHOSPHORUS: CPT

## 2023-02-13 PROCEDURE — 25000003 PHARM REV CODE 250

## 2023-02-13 PROCEDURE — 83735 ASSAY OF MAGNESIUM: CPT

## 2023-02-13 PROCEDURE — 36415 COLL VENOUS BLD VENIPUNCTURE: CPT

## 2023-02-13 PROCEDURE — 80053 COMPREHEN METABOLIC PANEL: CPT

## 2023-02-13 RX ORDER — HYDROMORPHONE HYDROCHLORIDE 1 MG/ML
0.5 INJECTION, SOLUTION INTRAMUSCULAR; INTRAVENOUS; SUBCUTANEOUS EVERY 6 HOURS PRN
Status: DISCONTINUED | OUTPATIENT
Start: 2023-02-13 | End: 2023-02-13

## 2023-02-13 RX ORDER — IBUPROFEN 200 MG
1 TABLET ORAL DAILY
Status: DISCONTINUED | OUTPATIENT
Start: 2023-02-13 | End: 2023-02-13 | Stop reason: HOSPADM

## 2023-02-13 RX ADMIN — DIAZEPAM 10 MG: 5 TABLET ORAL at 05:02

## 2023-02-13 RX ADMIN — HYDROMORPHONE HYDROCHLORIDE 0.5 MG: 1 INJECTION, SOLUTION INTRAMUSCULAR; INTRAVENOUS; SUBCUTANEOUS at 04:02

## 2023-02-13 RX ADMIN — OXYCODONE HYDROCHLORIDE 10 MG: 10 TABLET ORAL at 07:02

## 2023-02-13 NOTE — DISCHARGE SUMMARY
[facility] - [facility] ([place] [address])  [facility] Medicine  Discharge Summary      Patient Name: Lola Wetzel  MRN: [id number]  [facility]: [id number]  Patient Class: IP- Inpatient  Admission Date: 2/8/2023  Hospital Length of Stay: 4 days  Discharge Date and Time:  02/13/2023 11:29 AM  Attending Physician: Honey att. providers found   Discharging Provider: Nicki Garcia DO  Primary Care Provider: Janet Alaniz DO  Hospital Medicine Team: [facility] Nicki Garcia DO  Primary Care Team: [facility]    HPI:   Mr. Wetzel is a 37 year old man with a history of alcoholic pancreatitis, alcohol use, bipolar disorder, HTN who presents for admission with complaints of abdominal pain, nausea for the past two days. Patient states that he has been drinking heavily over the past few weeks, increased in the past week. He started having epigastric pain, nausea and vomiting for he past two days that has not abated, prompting him to seek care. He has had similar symptoms in the past. He states he has been dealing with a headache, but denies any tremors, vision changes, hallucinations. He feels he is starting to have some symptoms of withdrawal. He denies a history of seizures with withdrawals and states that he does not react well to lorezapam, developing severe muscle and back spasms the last time he received it. He sees an outpatient addiction psych medicine physician and states that he receives shots to help with his drinking. He has been drinking 48 oz of beer and 1.5L wine daily in addition to hard liquor. Previous notes indicate that he was drinking around 1.5 gallons of alcohol and trying to cut down. His last drink was around 10am the morning of admission.  He denies any tobacco use, drug use. He states he feels that his drinking is worse when he has too much time on his hands, he currently works managing short term rental properties. He was last admitted for alcoholic pancreatitis and withdrawal in November 2022. He  has been seen in the ED three times since then for concerns of pancreatitis and withdrawal.  He denies any fever, chills, dysuria, diarrhea, body aches.    In the ED, vital signs stable. Labs significant for elevated LFTs with , ALT 84, lipase 158. CT imaging showed hepatic steatosis, pancreatitis.       * No surgery found *      Hospital Course:   Admitted for acute alcoholic vs hyperlipidemic pancreatitis with concerns for concurrent alcohol withdrawal. Started on diazepam taper with PRN lorazepam. CLD ADAT with pain regimen and IVF. Patient continues to lose IV access due to agitation and confusion. Concern for infection due to fever of 100, infectious work up negative. Pt tolerating clear liquid without emesis, advancing diet. Pain regimen and CIWA protocol in place. Pain regimen deescalated to orals. Liver enzymes increasing, Liver doppler ordered. Pt able to tolerate diet without emesis. Multiple episodes of trying to leave the facility to get food. On 2/12 sitter imformed that the pt left the unit against charge nurse advice. Found the pt back in room with a bag of vodka, apple juice, and vape pens. Items were confiscated and held in charge nurse office. Established verbal behavioral contract with pt. 2/13 morning pt smelled of alchol, he snuck into charge nurse office to retreive his things and was adamant about leaving. IV removed, pt left against medical advice.        Goals of Care Treatment Preferences:  Code Status: Full Code      Consults:   Consults (From admission, onward)        Status Ordering Provider     Inpatient consult to Midline team  Once        Provider:  (Not yet assigned)    Completed CAROLINA DAS     [facility]  Once        Provider:  (Not yet assigned)    Completed MADELYN EL     Inpatient consult to Psychiatry  Once        Provider:  (Not yet assigned)    Completed DONAL OSBORN          No new Assessment & Plan notes have been filed under  this hospital service since the last note was generated.  Service: Hospital Medicine    Final Active Diagnoses:    Diagnosis Date Noted POA    PRINCIPAL PROBLEM:  Acute pancreatitis [K85.90] 06/28/2022 Yes    Alcohol withdrawal syndrome without complication [F10.930] 02/09/2023 Unknown    Morbid obesity [E66.01] 11/23/2022 Yes    Hypertension [I10] 11/22/2022 Yes    Elevated lipase [R74.8] 06/28/2022 Yes    Alcohol use disorder, severe, dependence [F10.20] 06/28/2022 Yes    Bipolar affective disorder in remission [F31.70] 06/15/2017 Yes      Problems Resolved During this Admission:       Discharged Condition: against medical advice    Disposition: Left Against Medical Adv*    Follow Up:    Patient Instructions:   No discharge procedures on file.    Significant Diagnostic Studies: Labs: All labs within the past 24 hours have been reviewed    Pending Diagnostic Studies:     Procedure Component Value Units Date/Time    Vitamin B1 [[id number]] Collected: 02/12/23 1434    Order Status: Sent Lab Status: In process Updated: 02/12/23 1442    Specimen: Blood          Medications:  Reconciled Home Medications:      Medication List      ASK your doctor about these medications    folic acid 1 MG tablet  Commonly known as: FOLVITE  Take 1,000 mcg by mouth once daily.     losartan 100 MG tablet  Commonly known as: COZAAR  Take 100 mg by mouth once daily.     QUEtiapine 300 MG Tab  Commonly known as: SEROQUEL  Take 600 mg by mouth every evening.            Indwelling Lines/Drains at time of discharge:   Lines/Drains/Airways     None                 Time spent on the discharge of patient: 30 minutes         Nicki Garcia DO  Department of Hospital Medicine  [facility] - Intensive Care ([place]-16)

## 2023-02-13 NOTE — PROGRESS NOTES
"Patient came to charge office and asked if he could leave the property to go to the store and get some "decent" food. This nurse told him absolutely not. A few minutes later, the sitter assigned to patient stated he left. I messaged the medical team and notified security.     Patient returned approximately 15 minutes later. A member of pts medical team brought items confiscated to be placed in a secured location until patient is discharged. The contents included a bottle of vodka and a vape pen.     Patent has made several attempts to retrieve these items from the charge office, each time he is told no, we will not give these items to him.     Medical team requests that if patient asks to leave again, please contact them and they will speak to him.    Requested nicotine patches to be ordered to assist with patient nicotine withdrawal.  "

## 2023-02-13 NOTE — PLAN OF CARE
Plan of Care Note     POC was reviewed with pt. He was very disruptive throughtout the shift. Constantly walking up to the nursing station being rude and walking the unit without a mask when told to do so several times. He c/o abdomen pain oxy was administered each time it was available along with Toradol and dilaudid. Ativan given for CIWA score. Pt waked into the charge nurses office and took a bag with alcohol that was confiscated from him on day shift. Security and the MD came to the bedside. Alcohol was discarded while security was near. A nicotine patch was ordered pt refused patch is not on at this time. Pt is currently resting in bed. No acute distress noted at this time. Safety maintained, will continue to monitor.            See flow sheets and MAR for further details    2/13/23  Cynthia Mckee RN

## 2023-02-13 NOTE — CARE UPDATE
Established verbal behavioral contract with patient.     Explained to pt, if he leaves unit/hospital without MD permission, he will have to be discharged. Explained importance of refraining from alcohol use while in the hospital as there are drug interactions with current medications being given. Pt understanding of agreement.

## 2023-02-13 NOTE — NURSING
Pt. came out of the room and started cursing at staff.   Requested to call security to escort him out.   Code Santino called. MD came at bedside.   Unable to calm the pt.   Security came. PIV removed.   AMA paper signed by pt.   Security escorted the pt.       Spoke to patient and advised

## 2023-02-13 NOTE — NURSING
Patient walked off of the unit while I was at lunch and came back with alcohol and a wax pen. The charge nurse took it away and placed it in the charge office/ MD is aware. Patient was eduacted that he cant leave the floor. Or ingest the substances.

## 2023-02-15 LAB
BACTERIA BLD CULT: NORMAL
BACTERIA BLD CULT: NORMAL

## 2023-02-20 LAB — VIT B1 BLD-MCNC: 76 UG/L (ref 38–122)

## 2023-04-08 ENCOUNTER — HOSPITAL ENCOUNTER (EMERGENCY)
Facility: OTHER | Age: 38
Discharge: HOME | End: 2023-04-08
Attending: EMERGENCY MEDICINE
Payer: MEDICAID

## 2023-04-08 VITALS
SYSTOLIC BLOOD PRESSURE: 148 MMHG | HEIGHT: 75 IN | OXYGEN SATURATION: 96 % | WEIGHT: 315 LBS | RESPIRATION RATE: 19 BRPM | DIASTOLIC BLOOD PRESSURE: 85 MMHG | TEMPERATURE: 98 F | HEART RATE: 99 BPM | BODY MASS INDEX: 39.17 KG/M2

## 2023-04-08 DIAGNOSIS — E66.9 OBESITY, UNSPECIFIED CLASSIFICATION, UNSPECIFIED OBESITY TYPE, UNSPECIFIED WHETHER SERIOUS COMORBIDITY PRESENT: ICD-10-CM

## 2023-04-08 DIAGNOSIS — R10.13 EPIGASTRIC PAIN: ICD-10-CM

## 2023-04-08 DIAGNOSIS — F10.10 ALCOHOL ABUSE: Primary | ICD-10-CM

## 2023-04-08 LAB
ALBUMIN SERPL BCP-MCNC: 3.8 G/DL (ref 3.5–5.2)
ALP SERPL-CCNC: 124 U/L (ref 55–135)
ALT SERPL W/O P-5'-P-CCNC: 43 U/L (ref 10–44)
AMPHET+METHAMPHET UR QL: NEGATIVE
ANION GAP SERPL CALC-SCNC: 10 MMOL/L (ref 8–16)
AST SERPL-CCNC: 92 U/L (ref 10–40)
BARBITURATES UR QL SCN>200 NG/ML: NEGATIVE
BASOPHILS # BLD AUTO: 0.14 K/UL (ref 0–0.2)
BASOPHILS NFR BLD: 2.6 % (ref 0–1.9)
BENZODIAZ UR QL SCN>200 NG/ML: NEGATIVE
BILIRUB SERPL-MCNC: 0.3 MG/DL (ref 0.1–1)
BUN SERPL-MCNC: 15 MG/DL (ref 6–20)
BZE UR QL SCN: NEGATIVE
CALCIUM SERPL-MCNC: 8.7 MG/DL (ref 8.7–10.5)
CANNABINOIDS UR QL SCN: NEGATIVE
CHLORIDE SERPL-SCNC: 99 MMOL/L (ref 95–110)
CO2 SERPL-SCNC: 27 MMOL/L (ref 23–29)
CREAT SERPL-MCNC: 1 MG/DL (ref 0.5–1.4)
CREAT UR-MCNC: 59.3 MG/DL (ref 23–375)
DIFFERENTIAL METHOD: ABNORMAL
EOSINOPHIL # BLD AUTO: 0.1 K/UL (ref 0–0.5)
EOSINOPHIL NFR BLD: 1.1 % (ref 0–8)
ERYTHROCYTE [DISTWIDTH] IN BLOOD BY AUTOMATED COUNT: 14.6 % (ref 11.5–14.5)
EST. GFR  (NO RACE VARIABLE): >60 ML/MIN/1.73 M^2
ETHANOL SERPL-MCNC: 239 MG/DL
GLUCOSE SERPL-MCNC: 158 MG/DL (ref 70–110)
HCT VFR BLD AUTO: 42.9 % (ref 40–54)
HGB BLD-MCNC: 14.3 G/DL (ref 14–18)
IMM GRANULOCYTES # BLD AUTO: 0.03 K/UL (ref 0–0.04)
IMM GRANULOCYTES NFR BLD AUTO: 0.6 % (ref 0–0.5)
LIPASE SERPL-CCNC: 39 U/L (ref 4–60)
LYMPHOCYTES # BLD AUTO: 2 K/UL (ref 1–4.8)
LYMPHOCYTES NFR BLD: 37.8 % (ref 18–48)
MAGNESIUM SERPL-MCNC: 2.3 MG/DL (ref 1.6–2.6)
MCH RBC QN AUTO: 29.1 PG (ref 27–31)
MCHC RBC AUTO-ENTMCNC: 33.3 G/DL (ref 32–36)
MCV RBC AUTO: 87 FL (ref 82–98)
METHADONE UR QL SCN>300 NG/ML: NEGATIVE
MONOCYTES # BLD AUTO: 0.6 K/UL (ref 0.3–1)
MONOCYTES NFR BLD: 10.9 % (ref 4–15)
NEUTROPHILS # BLD AUTO: 2.5 K/UL (ref 1.8–7.7)
NEUTROPHILS NFR BLD: 47 % (ref 38–73)
NRBC BLD-RTO: 0 /100 WBC
OPIATES UR QL SCN: NEGATIVE
PCP UR QL SCN>25 NG/ML: NEGATIVE
PLATELET # BLD AUTO: 367 K/UL (ref 150–450)
PMV BLD AUTO: 9.1 FL (ref 9.2–12.9)
POTASSIUM SERPL-SCNC: 4.1 MMOL/L (ref 3.5–5.1)
PROT SERPL-MCNC: 7.9 G/DL (ref 6–8.4)
RBC # BLD AUTO: 4.92 M/UL (ref 4.6–6.2)
SODIUM SERPL-SCNC: 136 MMOL/L (ref 136–145)
TOXICOLOGY INFORMATION: NORMAL
WBC # BLD AUTO: 5.34 K/UL (ref 3.9–12.7)

## 2023-04-08 PROCEDURE — 85025 COMPLETE CBC W/AUTO DIFF WBC: CPT | Performed by: EMERGENCY MEDICINE

## 2023-04-08 PROCEDURE — 82077 ASSAY SPEC XCP UR&BREATH IA: CPT | Performed by: EMERGENCY MEDICINE

## 2023-04-08 PROCEDURE — 96375 TX/PRO/DX INJ NEW DRUG ADDON: CPT

## 2023-04-08 PROCEDURE — 80307 DRUG TEST PRSMV CHEM ANLYZR: CPT | Performed by: EMERGENCY MEDICINE

## 2023-04-08 PROCEDURE — 83690 ASSAY OF LIPASE: CPT | Performed by: EMERGENCY MEDICINE

## 2023-04-08 PROCEDURE — 25000003 PHARM REV CODE 250: Performed by: EMERGENCY MEDICINE

## 2023-04-08 PROCEDURE — 63600175 PHARM REV CODE 636 W HCPCS: Performed by: EMERGENCY MEDICINE

## 2023-04-08 PROCEDURE — 99284 EMERGENCY DEPT VISIT MOD MDM: CPT | Mod: 25

## 2023-04-08 PROCEDURE — 96361 HYDRATE IV INFUSION ADD-ON: CPT

## 2023-04-08 PROCEDURE — 80053 COMPREHEN METABOLIC PANEL: CPT | Performed by: EMERGENCY MEDICINE

## 2023-04-08 PROCEDURE — 83735 ASSAY OF MAGNESIUM: CPT | Performed by: EMERGENCY MEDICINE

## 2023-04-08 PROCEDURE — 96374 THER/PROPH/DIAG INJ IV PUSH: CPT

## 2023-04-08 RX ORDER — OXYCODONE HYDROCHLORIDE 5 MG/1
5 TABLET ORAL
Status: COMPLETED | OUTPATIENT
Start: 2023-04-08 | End: 2023-04-08

## 2023-04-08 RX ORDER — KETOROLAC TROMETHAMINE 30 MG/ML
10 INJECTION, SOLUTION INTRAMUSCULAR; INTRAVENOUS
Status: COMPLETED | OUTPATIENT
Start: 2023-04-08 | End: 2023-04-08

## 2023-04-08 RX ORDER — OMEPRAZOLE 40 MG/1
40 CAPSULE, DELAYED RELEASE ORAL
Qty: 120 CAPSULE | Refills: 0 | Status: SHIPPED | OUTPATIENT
Start: 2023-04-08 | End: 2023-06-07

## 2023-04-08 RX ORDER — FAMOTIDINE 10 MG/ML
20 INJECTION INTRAVENOUS
Status: COMPLETED | OUTPATIENT
Start: 2023-04-08 | End: 2023-04-08

## 2023-04-08 RX ORDER — MAG HYDROX/ALUMINUM HYD/SIMETH 200-200-20
30 SUSPENSION, ORAL (FINAL DOSE FORM) ORAL ONCE
Status: COMPLETED | OUTPATIENT
Start: 2023-04-08 | End: 2023-04-08

## 2023-04-08 RX ORDER — ONDANSETRON 4 MG/1
4 TABLET, ORALLY DISINTEGRATING ORAL EVERY 6 HOURS PRN
Qty: 15 TABLET | Refills: 0 | Status: SHIPPED | OUTPATIENT
Start: 2023-04-08

## 2023-04-08 RX ORDER — LIDOCAINE HYDROCHLORIDE 20 MG/ML
15 SOLUTION OROPHARYNGEAL ONCE
Status: COMPLETED | OUTPATIENT
Start: 2023-04-08 | End: 2023-04-08

## 2023-04-08 RX ADMIN — OXYCODONE HYDROCHLORIDE 5 MG: 5 TABLET ORAL at 10:04

## 2023-04-08 RX ADMIN — ALUMINUM HYDROXIDE, MAGNESIUM HYDROXIDE, AND SIMETHICONE 30 ML: 200; 200; 20 SUSPENSION ORAL at 09:04

## 2023-04-08 RX ADMIN — FAMOTIDINE 20 MG: 10 INJECTION, SOLUTION INTRAVENOUS at 08:04

## 2023-04-08 RX ADMIN — LIDOCAINE HYDROCHLORIDE 15 ML: 20 SOLUTION ORAL; TOPICAL at 09:04

## 2023-04-08 RX ADMIN — SODIUM CHLORIDE 500 ML: 9 INJECTION, SOLUTION INTRAVENOUS at 10:04

## 2023-04-08 RX ADMIN — KETOROLAC TROMETHAMINE 10 MG: 30 INJECTION, SOLUTION INTRAMUSCULAR; INTRAVENOUS at 08:04

## 2023-04-08 NOTE — Clinical Note
"Lola Coffmanjuan Wetzel was seen and treated in our emergency department on 4/8/2023.  He may return to work on 04/10/2023.       If you have any questions or concerns, please don't hesitate to call.      Shaw Whatley MD"

## 2023-04-09 NOTE — ED TRIAGE NOTES
"Lola Wetzel, an 38 y.o. male presents to the ED c.o. diffuse abdominal pain due to "not having a drink in 5 hours". Pt reports "a gallon of vodka will last a day and a morning".       Chief Complaint   Patient presents with    Abdominal Pain     Pt is having diffuse upper abd pain - pt suspects the pain is due to he has not had an alcoholic drink in 5-6 hours ago     Review of patient's allergies indicates:  No Known Allergies  Past Medical History:   Diagnosis Date    Alcohol dependence with withdrawal 06/28/2022    Alcoholic pancreatitis 07/2022    Bipolar affective disorder in remission 06/15/2017    ?Dx    Hypertension     Hypertriglyceridemia 06/28/2022    Insomnia     Sleep apnea 11/22/2022      "

## 2023-04-09 NOTE — ED PROVIDER NOTES
Encounter Date: 4/8/2023       History     Chief Complaint   Patient presents with    Abdominal Pain     Pt is having diffuse upper abd pain - pt suspects the pain is due to he has not had an alcoholic drink in 5-6 hours ago     38-year-old man who presents for evaluation of abdominal pain after having drank alcohol earlier today.  He notes that he is a daily heavy alcohol drinker he was having some tremors and hallucinations this morning which prompted him to start drinking again with then developed pain in the abdomen thereafter.  Notes in the past that generally this is a result of his pancreas or liver which prompted him to come the hospital.  Denies any desire to harm himself is unsure about stopping drinking.    Review of patient's allergies indicates:  No Known Allergies  Past Medical History:   Diagnosis Date    Alcohol dependence with withdrawal 06/28/2022    Alcoholic pancreatitis 07/2022    Bipolar affective disorder in remission 06/15/2017    ?Dx    Hypertension     Hypertriglyceridemia 06/28/2022    Insomnia     Sleep apnea 11/22/2022     No past surgical history on file.  No family history on file.  Social History     Tobacco Use    Smoking status: Some Days     Types: Vaping with nicotine    Smokeless tobacco: Current     Types: Chew   Substance Use Topics    Alcohol use: Yes     Comment: Daily beer, liquor, wine daily, last use `1000    Drug use: No     Review of Systems  Constitutional-no fever  HEENT-no congestion  Eyes-no redness  Respiratory-no shortness of breath  Cardio-no chest pain  GI-positive abdominal pain  Endocrine-no cold intolerance  -no difficulty urinating  MSK-no myalgias  Skin-no rashes  Allergy-no environmental allergy  Neurologic-, no headache  Hematology-no swollen nodes  Behavioral-no confusion  Physical Exam     Initial Vitals [04/08/23 1911]   BP Pulse Resp Temp SpO2   113/84 100 16 98.4 °F (36.9 °C) 98 %      MAP       --         Physical Exam  Constitutional: Well  appearing, no distress.  Eyes: Conjunctivae normal.  ENT       Head: Normocephalic, atraumatic.       Nose: Normal external appearance        Mouth/Throat: no strigulous respirations   Hematological/Lymphatic/Immunilogical: no visible lymphadenopathy   Cardiovascular: Normal rate,   Respiratory: Normal respiratory effort.   Gastrointestinal: non distended   Musculoskeletal: Normal range of motion in all extremities. No obvious deformities or swelling.  Neurologic: Alert, oriented. Normal speech and language. No gross focal neurologic deficits are appreciated.  Skin: Skin is warm, dry. No rash noted.  Psychiatric: Mood and affect are normal.   ED Course   Procedures  Labs Reviewed   CBC W/ AUTO DIFFERENTIAL - Abnormal; Notable for the following components:       Result Value    RDW 14.6 (*)     MPV 9.1 (*)     Immature Granulocytes 0.6 (*)     Basophil % 2.6 (*)     All other components within normal limits   COMPREHENSIVE METABOLIC PANEL - Abnormal; Notable for the following components:    Glucose 158 (*)     AST 92 (*)     All other components within normal limits   ALCOHOL,MEDICAL (ETHANOL) - Abnormal; Notable for the following components:    Alcohol, Serum 239 (*)     All other components within normal limits   LIPASE   MAGNESIUM   DRUG SCREEN PANEL, URINE EMERGENCY    Narrative:     Specimen Source->Urine          Imaging Results    None          Medications   ketorolac injection 9.999 mg (9.999 mg Intravenous Given 4/8/23 2025)   famotidine (PF) injection 20 mg (20 mg Intravenous Given 4/8/23 2025)   aluminum-magnesium hydroxide-simethicone 200-200-20 mg/5 mL suspension 30 mL ( Oral Canceled Entry 4/8/23 2215)     And   LIDOcaine HCl 2% oral solution 15 mL ( Oral Canceled Entry 4/8/23 2215)   oxyCODONE immediate release tablet 5 mg (5 mg Oral Given 4/8/23 2224)   sodium chloride 0.9% bolus 500 mL 500 mL (0 mLs Intravenous Stopped 4/8/23 2311)     Medical Decision Making:   History:   Old Medical Records: I  decided to obtain old medical records.  Old Records Summarized: records from clinic visits and records from previous admission(s).  Differential Diagnosis:   Pancreatitis, hepatitis, chronic pain syndrome, chronic pancreatitis, alcoholic gastritis, alcoholism, ulcer  Clinical Tests:   Lab Tests: Ordered and Reviewed  ED Management:  Given the concern this gentleman's abdominal pain and history of pancreatitis previously obtained CMP, lipase, ethanol level CBC.    Administered analgesics IV fluids.    CMP is unrevealing overall, LFTs are improved from previous, lipase is normal.  Gentleman is generally well-appearing overall, low suspicion for more serious pancreatitis at this time ethanol is elevated.  Discussed with this gentleman the need for alcohol abstinence.  He denies any self-injury intent notes that he has been dealing with some difficulties this is prompting him to drink, discussed detox and rehabilitation centers, at this time he does not wish to pursue placement in detox.    Administered a dose of oral analgesics, he is tolerating orals at this time, generally well-appearing with reassuring vital signs save his mild hypertension.  Will plan for discharge, encouraged returning case of worsening symptoms and follow-up.                        Clinical Impression:   Final diagnoses:  [F10.10] Alcohol abuse (Primary)  [E66.9] Obesity, unspecified classification, unspecified obesity type, unspecified whether serious comorbidity present  [R10.13] Epigastric pain        ED Disposition Condition    Discharge Stable          ED Prescriptions       Medication Sig Dispense Start Date End Date Auth. Provider    omeprazole (PRILOSEC) 40 MG capsule Take 1 capsule (40 mg total) by mouth 2 (two) times daily before meals. 120 capsule 4/8/2023 6/7/2023 Shaw Whatley MD    ondansetron (ZOFRAN-ODT) 4 MG TbDL Take 1 tablet (4 mg total) by mouth every 6 (six) hours as needed. 15 tablet 4/8/2023 -- Shaw Whatley  MD          Follow-up Information       Follow up With Specialties Details Why Contact Info    Janet Alaniz, DO Family Medicine Call in 2 days If symptoms worsen, For a follow up visit about today [address] [address]  [phone]               Shaw Whatley MD  04/09/23 0008

## 2023-04-09 NOTE — DISCHARGE INSTRUCTIONS
Inpatient Detox Services    [facility]  [address] [place] [address]  [phone]  Accepts Medicaid and uninsured [place] resident.    [facility]  [address] [place] [address]  [phone]  Accepts Medicaid, Medicare, , and many private insurances.    [facility]  [address], [place] [address]  [phone]  Accepts Medicaid    [address] [address]   [email or web address]  [phone]  Accepts Medicaid and many private insurances.    [facility] [address], [place], [place] [address] [phone]   Accepts Medicaid, Medicare, , and many private insurances.     [place], [place])  [phone]  Accepts Medicaid and other insurances.    [facility] ([place], [place])  [phone]  Accepts Medicaid and many private insurances.   Provides transportation to and from facility.    [facility] ([place], [place])  [phone]  Accepts Medicaid and other insurances.    [facility] ([place], [place])  [phone]  Accepts Medicaid, uninsured, and many private insurances.     [facility] ([place], [place])  [phone]  Accepts certain Medicaid plans and many private insurances.    [facility] ([place], [place])  [address] [place] [address] [phone]    [facility]  [phone]--[place] location  [phone]--[place] location    Alcoholics Anonymous  Go to [place].org for locations and times  Locations with multiple meetings per day:  [facility] - [address] ([facility])  [facility] - [address] [facility] ([facility])  [facility] - [address] ([facility])  [facility] - [address] ([place])    For more resources, please call [facility] or the 24/7 [facility] Crisis Response Team at [phone].     Homeless Resources    [facility] for individuals and  families  [address]  [phone]    [facility]  Emergency shelter for men only  Meals daily 6am, 2pm, & 6pm  Clothing, case management M-F by appointment (ID/job/housing/legal assistance), mail  [address]   [phone]    [facility]  Emergency shelter for men  [address] [facility]  [phone]  Emergency shelter for women  [address]   [phone]  Breakfast & lunch daily, dinner M-F  Case management, job counseling services    [facility]  [facility]  [address] [facility] ([facility])  [phone]    [facility]  Emergency shelter for teens and young adults up to 22yo  [address] [facility]  [phone]    [facility]  Emergency shelter for women over 17yo and their kids  [address] [place], [place] [id number] [phone]    [facility]  Day program, meals M-F 1PM (arrive early)  Showers, laundry, hygiene kits, showers, phones, , notary services, case management, ID assisance  [address] [facility]  [phone] M-F 8am-2:30pm    [facility]  Day program  MTWF 7:30am-3:30pm,  8:30am-3:30pm  Crisis intervention, employment assistance, food/clothing, hygiene kits, bus tokens, mail  [address] [facility] ([facility])  [phone]    [facility]  Mobile outreach for homeless persons in [facility]  [phone]    [facility]  Primary healthcare, case management, dental services, TB placement  Call ahead  [address] [facility] [address]nd [address]  [phone]    [facility]  Connects homeless people with their loved ones in other cities by providing transportation costs   [phone]      Mr. Wetzel,    Thank you for letting me care for you today! It was nice meeting you, and I hope you feel better soon.   If you would like access to your chart and what was done today please utilize the [facility].   Please come back to [facility] for all of your future medical  needs.    Our goal in the emergency department is to always give you outstanding care and exceptional service. You may receive a survey by mail or e-mail in the next week regarding your experience in our ED. We would greatly appreciate you completing and returning the survey. Your feedback provides us with a way to recognize our staff who give very good care and it helps us learn how to improve when your experience was below our aspiration of excellence.     Sincerely,    Shaw Whatley MD  Board Certified Emergency Physician

## 2023-10-10 NOTE — ASSESSMENT & PLAN NOTE
Secondary to severe inflammatory response from acute pancreatitis, alcohol abuse/withdrawal, and possibly underlying mental illness.  Agitation improved.  Continue to wean dexmedetomidine infusion.  As needed benzodiazepines for withdrawal symptoms.  Psychiatry evaluated patient recommended physician emergency certificate (PEC).  Re-evaluated today by psychiatry who recommends rescinding PEC.  PEC/CEC rescinded.   Previously Declined (within the last year)